# Patient Record
Sex: FEMALE | Race: WHITE | Employment: UNEMPLOYED | URBAN - NONMETROPOLITAN AREA
[De-identification: names, ages, dates, MRNs, and addresses within clinical notes are randomized per-mention and may not be internally consistent; named-entity substitution may affect disease eponyms.]

---

## 2017-11-12 ENCOUNTER — HOSPITAL ENCOUNTER (INPATIENT)
Age: 27
LOS: 4 days | Discharge: HOME | DRG: 897 | End: 2017-11-16
Attending: EMERGENCY MEDICINE | Admitting: PSYCHIATRY & NEUROLOGY
Payer: MEDICAID

## 2017-11-12 DIAGNOSIS — R45.851 SUICIDAL IDEATION: Primary | ICD-10-CM

## 2017-11-12 DIAGNOSIS — N30.01 ACUTE CYSTITIS WITH HEMATURIA: ICD-10-CM

## 2017-11-12 DIAGNOSIS — E87.6 HYPOKALEMIA: ICD-10-CM

## 2017-11-12 DIAGNOSIS — F15.10 AMPHETAMINE ABUSE (HCC): ICD-10-CM

## 2017-11-12 LAB
ACETAMINOPHEN LEVEL: <15 UG/ML
ALBUMIN SERPL-MCNC: 4.6 G/DL (ref 3.5–5.2)
ALP BLD-CCNC: 70 U/L (ref 35–104)
ALT SERPL-CCNC: 13 U/L (ref 5–33)
AMPHETAMINE SCREEN, URINE: POSITIVE
ANION GAP SERPL CALCULATED.3IONS-SCNC: 13 MMOL/L (ref 7–19)
AST SERPL-CCNC: 28 U/L (ref 5–32)
BACTERIA: ABNORMAL /HPF
BARBITURATE SCREEN URINE: NEGATIVE
BASOPHILS ABSOLUTE: 0 K/UL (ref 0–0.2)
BASOPHILS RELATIVE PERCENT: 0.3 % (ref 0–1)
BENZODIAZEPINE SCREEN, URINE: NEGATIVE
BILIRUB SERPL-MCNC: 0.3 MG/DL (ref 0.2–1.2)
BILIRUBIN URINE: NEGATIVE
BLOOD, URINE: ABNORMAL
BUN BLDV-MCNC: 15 MG/DL (ref 6–20)
CALCIUM SERPL-MCNC: 9.8 MG/DL (ref 8.6–10)
CANNABINOID SCREEN URINE: NEGATIVE
CHLORIDE BLD-SCNC: 99 MMOL/L (ref 98–111)
CLARITY: ABNORMAL
CO2: 29 MMOL/L (ref 22–29)
COCAINE METABOLITE SCREEN URINE: NEGATIVE
COLOR: YELLOW
CREAT SERPL-MCNC: 0.7 MG/DL (ref 0.5–0.9)
CRYSTALS, UA: ABNORMAL /HPF
EOSINOPHILS ABSOLUTE: 0.2 K/UL (ref 0–0.6)
EOSINOPHILS RELATIVE PERCENT: 3.1 % (ref 0–5)
EPITHELIAL CELLS, UA: ABNORMAL /HPF
ETHANOL: <10 MG/DL (ref 0–0.08)
GFR NON-AFRICAN AMERICAN: >60
GLUCOSE BLD-MCNC: 87 MG/DL (ref 74–109)
GLUCOSE URINE: NEGATIVE MG/DL
HCG(URINE) PREGNANCY TEST: NEGATIVE
HCT VFR BLD CALC: 43.7 % (ref 37–47)
HEMOGLOBIN: 13.8 G/DL (ref 12–16)
KETONES, URINE: NEGATIVE MG/DL
LEUKOCYTE ESTERASE, URINE: ABNORMAL
LYMPHOCYTES ABSOLUTE: 2 K/UL (ref 1.1–4.5)
LYMPHOCYTES RELATIVE PERCENT: 26.2 % (ref 20–40)
Lab: ABNORMAL
MCH RBC QN AUTO: 30.9 PG (ref 27–31)
MCHC RBC AUTO-ENTMCNC: 31.6 G/DL (ref 33–37)
MCV RBC AUTO: 98 FL (ref 81–99)
MONOCYTES ABSOLUTE: 0.6 K/UL (ref 0–0.9)
MONOCYTES RELATIVE PERCENT: 7.9 % (ref 0–10)
NEUTROPHILS ABSOLUTE: 4.8 K/UL (ref 1.5–7.5)
NEUTROPHILS RELATIVE PERCENT: 62.2 % (ref 50–65)
NITRITE, URINE: NEGATIVE
OPIATE SCREEN URINE: NEGATIVE
PDW BLD-RTO: 14.2 % (ref 11.5–14.5)
PH UA: 6
PLATELET # BLD: 365 K/UL (ref 130–400)
PMV BLD AUTO: 9.5 FL (ref 9.4–12.3)
POTASSIUM SERPL-SCNC: 3.2 MMOL/L (ref 3.5–5)
PROTEIN UA: ABNORMAL MG/DL
RBC # BLD: 4.46 M/UL (ref 4.2–5.4)
SALICYLATE, SERUM: <3 MG/DL (ref 3–10)
SODIUM BLD-SCNC: 141 MMOL/L (ref 136–145)
SPECIFIC GRAVITY UA: 1.02
TOTAL PROTEIN: 8.4 G/DL (ref 6.6–8.7)
UROBILINOGEN, URINE: 0.2 E.U./DL
WBC # BLD: 7.6 K/UL (ref 4.8–10.8)
WBC UA: ABNORMAL /HPF (ref 0–5)

## 2017-11-12 PROCEDURE — 6370000000 HC RX 637 (ALT 250 FOR IP): Performed by: EMERGENCY MEDICINE

## 2017-11-12 PROCEDURE — 80307 DRUG TEST PRSMV CHEM ANLYZR: CPT

## 2017-11-12 PROCEDURE — 80053 COMPREHEN METABOLIC PANEL: CPT

## 2017-11-12 PROCEDURE — 36415 COLL VENOUS BLD VENIPUNCTURE: CPT

## 2017-11-12 PROCEDURE — G0480 DRUG TEST DEF 1-7 CLASSES: HCPCS

## 2017-11-12 PROCEDURE — 1240000000 HC EMOTIONAL WELLNESS R&B

## 2017-11-12 PROCEDURE — 85025 COMPLETE CBC W/AUTO DIFF WBC: CPT

## 2017-11-12 PROCEDURE — 99285 EMERGENCY DEPT VISIT HI MDM: CPT

## 2017-11-12 PROCEDURE — 99284 EMERGENCY DEPT VISIT MOD MDM: CPT | Performed by: EMERGENCY MEDICINE

## 2017-11-12 PROCEDURE — 81025 URINE PREGNANCY TEST: CPT

## 2017-11-12 PROCEDURE — 87086 URINE CULTURE/COLONY COUNT: CPT

## 2017-11-12 PROCEDURE — 81001 URINALYSIS AUTO W/SCOPE: CPT

## 2017-11-12 RX ORDER — POTASSIUM CHLORIDE 3 G/15ML
40 SOLUTION ORAL ONCE
Status: COMPLETED | OUTPATIENT
Start: 2017-11-12 | End: 2017-11-12

## 2017-11-12 RX ORDER — ACETAMINOPHEN 325 MG/1
650 TABLET ORAL EVERY 4 HOURS PRN
Status: DISCONTINUED | OUTPATIENT
Start: 2017-11-12 | End: 2017-11-13

## 2017-11-12 RX ORDER — NICOTINE 21 MG/24HR
1 PATCH, TRANSDERMAL 24 HOURS TRANSDERMAL DAILY
Status: DISCONTINUED | OUTPATIENT
Start: 2017-11-12 | End: 2017-11-16 | Stop reason: HOSPADM

## 2017-11-12 RX ORDER — NITROFURANTOIN 25; 75 MG/1; MG/1
100 CAPSULE ORAL ONCE
Status: COMPLETED | OUTPATIENT
Start: 2017-11-12 | End: 2017-11-12

## 2017-11-12 RX ADMIN — Medication 40 MEQ: at 16:40

## 2017-11-12 RX ADMIN — NITROFURANTOIN (MONOHYDRATE/MACROCRYSTALS) 100 MG: 75; 25 CAPSULE ORAL at 16:40

## 2017-11-12 ASSESSMENT — SLEEP AND FATIGUE QUESTIONNAIRES
SLEEP PATTERN: INSOMNIA
AVERAGE NUMBER OF SLEEP HOURS: 2
DIFFICULTY ARISING: YES
DO YOU HAVE DIFFICULTY SLEEPING: YES
DO YOU USE A SLEEP AID: YES
DIFFICULTY STAYING ASLEEP: YES
RESTFUL SLEEP: NO
DIFFICULTY FALLING ASLEEP: YES

## 2017-11-12 ASSESSMENT — ENCOUNTER SYMPTOMS
DIARRHEA: 0
BACK PAIN: 0
SHORTNESS OF BREATH: 0
CONSTIPATION: 0
CHEST TIGHTNESS: 0
BLOOD IN STOOL: 0
ABDOMINAL PAIN: 0
COUGH: 0
VOMITING: 0
NAUSEA: 0

## 2017-11-12 ASSESSMENT — PAIN SCALES - GENERAL
PAINLEVEL_OUTOF10: 0
PAINLEVEL_OUTOF10: 0

## 2017-11-12 ASSESSMENT — PATIENT HEALTH QUESTIONNAIRE - PHQ9: SUM OF ALL RESPONSES TO PHQ QUESTIONS 1-9: 24

## 2017-11-12 ASSESSMENT — LIFESTYLE VARIABLES: HISTORY_ALCOHOL_USE: YES
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
## 2017-11-12 NOTE — ED NOTES
Pt gowned and monitored. Personal belongs removed and placed at RN station. Suicidal Flowsheet Initiated.      Missael Muse RN  11/12/17 6683

## 2017-11-12 NOTE — PROGRESS NOTES
BHI Admission From ED  Nursing Admission Note    Admission Type: Voluntary    Reason for Admission: suicidal ideation, no plan, multiple substance abuse    There is no problem list on file for this patient. Pt admitted from Dr. Estefany sin in ED to Adult [facility] room # 602-1. Arrived on unit via [facility] with . Pt appropriately attired in paper scrubs. Body assessment completed by Nba RN with no contraband discovered. All tubes, lines, and drains were appropriately discontinued by ED staff prior to pt transfer to [facility]. Pt belongings and valuables inventoried and cataloged, stored per policy. Pt oriented to surroundings, program expectations, and copy pt rights given. Received admit packet: [facility], Visitation Info, Fall Prevention, Restraints Info. Consents reviewed, signed Pt Rights, Handbook Acceptance, Visit/Call Acceptance, PHI Release, Social Info Release, and Treatment Agreement. Pt verbalizes understanding. Identifies stressors. Substance abuse.     Status and Exam  Normal: No  Facial Expression: Flat, Avoids Gaze  Affect: Blunt  Level of Consciousness: Alert  Mood:Normal: No  Mood: Depressed, Anxious, Empty, Guilty, Sad  Motor Activity:Normal: No  Motor Activity: Repetitive Acts  Interview Behavior: Cooperative  Preception: [place] to Person, Antonetta Shave to Time, [place] to Place, [place] to Situation  Attention:Normal: No  Attention: Distractible, Unable to Concentrate  Thought Processes: Circumstantial, Tangential  Thought Content:Normal: No  Thought Content: Preoccupations  Hallucinations: Visual (Comment) (visual when coming down from drugs)  Delusions: No  Memory:Normal: No  Memory: Poor Recent  Insight and Judgment: No  Insight and Judgment: Poor Judgment, Poor Insight  Present Suicidal Ideation: Yes  Present Homicidal Ideation: No    Germain Monday RN
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
## 2017-11-13 PROBLEM — F19.10 DRUG ABUSE (HCC): Status: ACTIVE | Noted: 2017-11-13

## 2017-11-13 PROBLEM — F15.10 METHAMPHETAMINE ABUSE (HCC): Status: ACTIVE | Noted: 2017-11-13

## 2017-11-13 PROBLEM — F10.10 ALCOHOL ABUSE: Status: ACTIVE | Noted: 2017-11-13

## 2017-11-13 PROBLEM — F19.94 SUBSTANCE INDUCED MOOD DISORDER (HCC): Status: ACTIVE | Noted: 2017-11-13

## 2017-11-13 PROBLEM — F11.10 OPIOID ABUSE (HCC): Status: ACTIVE | Noted: 2017-11-13

## 2017-11-13 PROCEDURE — 90686 IIV4 VACC NO PRSV 0.5 ML IM: CPT | Performed by: PSYCHIATRY & NEUROLOGY

## 2017-11-13 PROCEDURE — 6370000000 HC RX 637 (ALT 250 FOR IP): Performed by: NURSE PRACTITIONER

## 2017-11-13 PROCEDURE — 6370000000 HC RX 637 (ALT 250 FOR IP): Performed by: FAMILY MEDICINE

## 2017-11-13 PROCEDURE — 6360000002 HC RX W HCPCS: Performed by: FAMILY MEDICINE

## 2017-11-13 PROCEDURE — G0008 ADMIN INFLUENZA VIRUS VAC: HCPCS | Performed by: PSYCHIATRY & NEUROLOGY

## 2017-11-13 PROCEDURE — 90792 PSYCH DIAG EVAL W/MED SRVCS: CPT | Performed by: NURSE PRACTITIONER

## 2017-11-13 PROCEDURE — 6360000002 HC RX W HCPCS: Performed by: PSYCHIATRY & NEUROLOGY

## 2017-11-13 PROCEDURE — 6370000000 HC RX 637 (ALT 250 FOR IP): Performed by: PSYCHIATRY & NEUROLOGY

## 2017-11-13 PROCEDURE — 1240000000 HC EMOTIONAL WELLNESS R&B

## 2017-11-13 RX ORDER — ESCITALOPRAM OXALATE 10 MG/1
10 TABLET ORAL DAILY
Status: DISCONTINUED | OUTPATIENT
Start: 2017-11-13 | End: 2017-11-16 | Stop reason: HOSPADM

## 2017-11-13 RX ORDER — AZITHROMYCIN 250 MG/1
1000 TABLET, FILM COATED ORAL ONCE
Status: COMPLETED | OUTPATIENT
Start: 2017-11-13 | End: 2017-11-13

## 2017-11-13 RX ORDER — IBUPROFEN 400 MG/1
400 TABLET ORAL EVERY 6 HOURS PRN
Status: DISCONTINUED | OUTPATIENT
Start: 2017-11-13 | End: 2017-11-16 | Stop reason: HOSPADM

## 2017-11-13 RX ORDER — THIAMINE MONONITRATE (VIT B1) 100 MG
100 TABLET ORAL DAILY
Status: DISCONTINUED | OUTPATIENT
Start: 2017-11-13 | End: 2017-11-16 | Stop reason: HOSPADM

## 2017-11-13 RX ORDER — OLANZAPINE 10 MG/1
10 TABLET ORAL NIGHTLY
Status: DISCONTINUED | OUTPATIENT
Start: 2017-11-13 | End: 2017-11-16 | Stop reason: HOSPADM

## 2017-11-13 RX ORDER — CEFTRIAXONE SODIUM 250 MG/1
250 INJECTION, POWDER, FOR SOLUTION INTRAMUSCULAR; INTRAVENOUS ONCE
Status: COMPLETED | OUTPATIENT
Start: 2017-11-13 | End: 2017-11-13

## 2017-11-13 RX ORDER — CLONIDINE HYDROCHLORIDE 0.1 MG/1
0.1 TABLET ORAL EVERY 4 HOURS PRN
Status: DISCONTINUED | OUTPATIENT
Start: 2017-11-13 | End: 2017-11-16 | Stop reason: HOSPADM

## 2017-11-13 RX ORDER — FOLIC ACID 1 MG/1
1 TABLET ORAL DAILY
Status: DISCONTINUED | OUTPATIENT
Start: 2017-11-13 | End: 2017-11-13

## 2017-11-13 RX ORDER — OLANZAPINE 5 MG/1
5 TABLET ORAL ONCE
Status: COMPLETED | OUTPATIENT
Start: 2017-11-13 | End: 2017-11-13

## 2017-11-13 RX ORDER — LOPERAMIDE HYDROCHLORIDE 2 MG/1
2 CAPSULE ORAL 4 TIMES DAILY PRN
Status: DISCONTINUED | OUTPATIENT
Start: 2017-11-13 | End: 2017-11-16 | Stop reason: HOSPADM

## 2017-11-13 RX ORDER — CHLORDIAZEPOXIDE HYDROCHLORIDE 25 MG/1
25 CAPSULE, GELATIN COATED ORAL EVERY 4 HOURS PRN
Status: DISCONTINUED | OUTPATIENT
Start: 2017-11-13 | End: 2017-11-16 | Stop reason: HOSPADM

## 2017-11-13 RX ORDER — CEFTRIAXONE 1 G/1
250 INJECTION, POWDER, FOR SOLUTION INTRAMUSCULAR; INTRAVENOUS ONCE
Status: DISCONTINUED | OUTPATIENT
Start: 2017-11-13 | End: 2017-11-13 | Stop reason: SDUPTHER

## 2017-11-13 RX ORDER — DICYCLOMINE HYDROCHLORIDE 10 MG/1
10 CAPSULE ORAL
Status: DISCONTINUED | OUTPATIENT
Start: 2017-11-13 | End: 2017-11-16 | Stop reason: HOSPADM

## 2017-11-13 RX ADMIN — DICYCLOMINE HYDROCHLORIDE 10 MG: 10 CAPSULE ORAL at 15:45

## 2017-11-13 RX ADMIN — ESCITALOPRAM OXALATE 10 MG: 10 TABLET ORAL at 11:07

## 2017-11-13 RX ADMIN — DICYCLOMINE HYDROCHLORIDE 10 MG: 10 CAPSULE ORAL at 11:08

## 2017-11-13 RX ADMIN — AZITHROMYCIN 1000 MG: 250 TABLET, FILM COATED ORAL at 15:45

## 2017-11-13 RX ADMIN — IBUPROFEN 400 MG: 600 TABLET, FILM COATED ORAL at 21:18

## 2017-11-13 RX ADMIN — Medication 100 MG: at 11:08

## 2017-11-13 RX ADMIN — ACETAMINOPHEN 650 MG: 325 TABLET, FILM COATED ORAL at 01:08

## 2017-11-13 RX ADMIN — IBUPROFEN 400 MG: 600 TABLET, FILM COATED ORAL at 11:07

## 2017-11-13 RX ADMIN — ACETAMINOPHEN 650 MG: 325 TABLET, FILM COATED ORAL at 09:20

## 2017-11-13 RX ADMIN — OLANZAPINE 5 MG: 5 TABLET, FILM COATED ORAL at 11:07

## 2017-11-13 RX ADMIN — OLANZAPINE 10 MG: 10 TABLET, FILM COATED ORAL at 21:18

## 2017-11-13 RX ADMIN — CEFTRIAXONE SODIUM 250 MG: 250 INJECTION, POWDER, FOR SOLUTION INTRAMUSCULAR; INTRAVENOUS at 15:45

## 2017-11-13 RX ADMIN — CHLORDIAZEPOXIDE HYDROCHLORIDE 25 MG: 25 CAPSULE ORAL at 11:07

## 2017-11-13 RX ADMIN — INFLUENZA A VIRUS A/CALIFORNIA/7/2009 X-179A (H1N1) ANTIGEN (FORMALDEHYDE INACTIVATED), INFLUENZA A VIRUS A/TEXAS/50/2012 X-223A (H3N2) ANTIGEN (FORMALDEHYDE INACTIVATED), INFLUENZA B VIRUS B/MASSACHUSETTS/2/2012 BX-51B ANTIGEN (FORMALDEHYDE INACTIVATED), AND INFLUENZA B VIRUS B/BRISBANE/60/2008 ANTIGEN (FORMALDEHYDE INACTIVATED) 0.5 ML: 15; 15; 15; 15 INJECTION, SUSPENSION INTRAMUSCULAR at 09:18

## 2017-11-13 ASSESSMENT — PAIN SCALES - GENERAL
PAINLEVEL_OUTOF10: 6
PAINLEVEL_OUTOF10: 4
PAINLEVEL_OUTOF10: 6
PAINLEVEL_OUTOF10: 5

## 2017-11-13 NOTE — PROGRESS NOTES
Patient is alert but very guarded with flat effect. Looks like she hasnt showered this am.  She said sleep was poor due to nightmares. Appetite has been good. Denies homicidal thoughts and auditory and visual hallucinations. When asked if she was having any suicidal thoughts she looked down and wouldn't give me an answer. I asked her again and had to say her name and tell her to look at me and she nodded no while looking down at the ground.

## 2017-11-13 NOTE — PROGRESS NOTES
Requirement Note     SW met with pt to complete Psychosocial within 72 hours, CSSRS within 24 hours, and treatment plan signature sheet within 72 hours. In the last 6 months has the pt been a danger to self: YES  In the last 6 months has the pt been a danger to others: /NO    Provided pt with [employer] Online handout entitled \"Quitting Smoking. \"  Reviewed handout with pt addressing dangers of smoking, developing coping skills, and providing basic information about quitting. Patient received all components practical counseling of tobacco practical counseling during the hospital stay.

## 2017-11-13 NOTE — PROGRESS NOTES
Admission Note      Reason for admission/Target Symptom: increasing depression and SI  Diagnoses:  UDS: Negative- Benzo- Opiate- Amphetamines- THC- Cocaine- Barbs  BAL: Negative     SW met with treatment team to discuss patient treatment and follow up care plans. Pt was admitted to [facility]. Treatment team has  identified patients discharge needs as medication management and therapy with [address] [place] [address]. Pt validated need for appointments. Pt was also provided a handout of contact information for drug and alcohol treatment centers and other community support service such as [facility] .

## 2017-11-13 NOTE — PLAN OF CARE
Problem: Altered Mood, Depressive Behavior  Goal: LTG-Able to verbalize acceptance of life and situations over which he or she has no control  Outcome: Ongoing                                                                      Group Therapy Note    Date: 11/13/2017  Start Time: 1000  End Time:  1045  Number of Participants: 17    Type of Group: Psychotherapy      Patient's Goal: Patient will process emotions and actions and how to relate to other or their with others. Patient discussed open communication and feelings and emotions. Notes:  Patient attended process group as scheduled, patient identified a issue to work on today regarding how they will interact and relate to others. Status After Intervention:  Improved    Participation Level:  Active Listener    Participation Quality: Appropriate, Attentive and Sharing      Speech:  normal      Thought Process/Content: Logical      Affective Functioning: Congruent      Mood: euthymic      Level of consciousness:  Alert      Response to Learning: Able to verbalize current knowledge/experience      Endings: None Reported    Modes of Intervention: Education, Support and Socialization      Discipline Responsible: /Counselor      Signature:  Farzana Enamorado [facility]

## 2017-11-13 NOTE — PLAN OF CARE
Problem: Injury - Risk of, Substance Overdose:  Goal: No signs of physiological stress  Absence of drug withdrawal signs and symptoms   Outcome: Ongoing    Goal: Ability to remain free from injury will improve  Ability to remain free from injury will improve   Outcome: Ongoing      Problem: Mood - Altered:  Goal: Mood stable  Mood stable   Outcome: Ongoing      Problem: Violence - Risk of, Self/Other-Directed:  Goal: Knowledge of developmental care interventions  Absence of violence   Outcome: Ongoing

## 2017-11-13 NOTE — PROGRESS NOTES
Treatment Team Note:    JANET met with [address] [place] [address] team to discuss Pts [address] plans. Progress/Behavior/Group Attendance: TBD    Target Symptoms/Reason for admission:     Diagnoses:     UDS: Neg- Benzo-Opiate- Amphetamines- THC-Cocaine- Barbs     BAL: Neg    AftercarePlan: [address] lives with: JANET will meet with pt to gather information. Collateral obtained from: JANET will meet with pt to gather release of information.   On:    Family Session: HOANG    Misc:
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
## 2017-11-14 LAB
ANION GAP SERPL CALCULATED.3IONS-SCNC: 11 MMOL/L (ref 7–19)
BUN BLDV-MCNC: 22 MG/DL (ref 6–20)
CALCIUM SERPL-MCNC: 8.5 MG/DL (ref 8.6–10)
CHLORIDE BLD-SCNC: 105 MMOL/L (ref 98–111)
CO2: 23 MMOL/L (ref 22–29)
CREAT SERPL-MCNC: 0.5 MG/DL (ref 0.5–0.9)
GFR NON-AFRICAN AMERICAN: >60
GLUCOSE BLD-MCNC: 86 MG/DL (ref 74–109)
POTASSIUM SERPL-SCNC: 3.8 MMOL/L (ref 3.5–5)
SODIUM BLD-SCNC: 139 MMOL/L (ref 136–145)
T4 FREE: 1.1 NG/DL (ref 0.9–1.7)
TSH SERPL DL<=0.05 MIU/L-ACNC: 0.2 UIU/ML (ref 0.27–4.2)
URINE CULTURE, ROUTINE: NORMAL
VITAMIN B-12: 374 PG/ML (ref 211–946)
VITAMIN D 25-HYDROXY: 28.5 NG/ML

## 2017-11-14 PROCEDURE — 36415 COLL VENOUS BLD VENIPUNCTURE: CPT

## 2017-11-14 PROCEDURE — 87591 N.GONORRHOEAE DNA AMP PROB: CPT

## 2017-11-14 PROCEDURE — 82607 VITAMIN B-12: CPT

## 2017-11-14 PROCEDURE — 99231 SBSQ HOSP IP/OBS SF/LOW 25: CPT | Performed by: NURSE PRACTITIONER

## 2017-11-14 PROCEDURE — 84443 ASSAY THYROID STIM HORMONE: CPT

## 2017-11-14 PROCEDURE — 1240000000 HC EMOTIONAL WELLNESS R&B

## 2017-11-14 PROCEDURE — 6370000000 HC RX 637 (ALT 250 FOR IP): Performed by: PSYCHIATRY & NEUROLOGY

## 2017-11-14 PROCEDURE — 82306 VITAMIN D 25 HYDROXY: CPT

## 2017-11-14 PROCEDURE — 6370000000 HC RX 637 (ALT 250 FOR IP): Performed by: NURSE PRACTITIONER

## 2017-11-14 PROCEDURE — 84439 ASSAY OF FREE THYROXINE: CPT

## 2017-11-14 PROCEDURE — 80048 BASIC METABOLIC PNL TOTAL CA: CPT

## 2017-11-14 PROCEDURE — 87491 CHLMYD TRACH DNA AMP PROBE: CPT

## 2017-11-14 RX ORDER — ERGOCALCIFEROL (VITAMIN D2) 1250 MCG
50000 CAPSULE ORAL WEEKLY
Qty: 11 CAPSULE | Refills: 0 | Status: ON HOLD | OUTPATIENT
Start: 2017-11-14 | End: 2017-11-22 | Stop reason: HOSPADM

## 2017-11-14 RX ORDER — ERGOCALCIFEROL 1.25 MG/1
50000 CAPSULE ORAL WEEKLY
Status: DISCONTINUED | OUTPATIENT
Start: 2017-11-14 | End: 2017-11-14

## 2017-11-14 RX ORDER — ERGOCALCIFEROL 1.25 MG/1
50000 CAPSULE ORAL WEEKLY
Status: DISCONTINUED | OUTPATIENT
Start: 2017-11-15 | End: 2017-11-16 | Stop reason: HOSPADM

## 2017-11-14 RX ADMIN — DICYCLOMINE HYDROCHLORIDE 10 MG: 10 CAPSULE ORAL at 06:35

## 2017-11-14 RX ADMIN — IBUPROFEN 400 MG: 600 TABLET, FILM COATED ORAL at 15:09

## 2017-11-14 RX ADMIN — OLANZAPINE 10 MG: 10 TABLET, FILM COATED ORAL at 21:55

## 2017-11-14 RX ADMIN — Medication 100 MG: at 10:11

## 2017-11-14 RX ADMIN — DICYCLOMINE HYDROCHLORIDE 10 MG: 10 CAPSULE ORAL at 10:14

## 2017-11-14 RX ADMIN — ESCITALOPRAM OXALATE 10 MG: 10 TABLET ORAL at 10:11

## 2017-11-14 RX ADMIN — DICYCLOMINE HYDROCHLORIDE 10 MG: 10 CAPSULE ORAL at 15:09

## 2017-11-14 RX ADMIN — CHLORDIAZEPOXIDE HYDROCHLORIDE 25 MG: 25 CAPSULE ORAL at 15:09

## 2017-11-14 ASSESSMENT — PAIN SCALES - GENERAL: PAINLEVEL_OUTOF10: 5

## 2017-11-14 NOTE — PROGRESS NOTES
Group Therapy Note:    Date: 11/13/17  Time: 21:00 to 21:30    Type of Group:  Wrap-Up Group    Patient Goals:  Patient did not participate in group session. Reported to nurse patient did not participate in group session. Encouragement given per staff.       Notes: N/A      Cielo JAVED

## 2017-11-14 NOTE — PROGRESS NOTES
Patient is lethargic and disheveled. Not doing ADLS or attending group. Patient just wants to stay in room all day and sleep. Agitated and says doesn't feel well. Rates anxiety a 5 and depression a 6 on a scale of 1-10 with 10 being the highest.  Denies SI, HI, and AVH. Sleep is good, appetite is poor.

## 2017-11-14 NOTE — H&P
HISTORY and PHYSICAL      CHIEF COMPLAINT:  Depression, SI    Reason for Admission:  Depression, SI    History Obtained From:  patient    HISTORY OF PRESENT ILLNESS:      The patient is a 24 y.o. female who is admitted to the [place] [address] unit with worsening mood issues. She has no c/o CP or SOA. No abdominal pain or N/V. No dysuria. No recent illnesses or fevers. No current pain complaints. Past Medical History:        Diagnosis Date    Anxiety     Bipolar 1 disorder ([address]     Depression      Past Surgical History:        Procedure Laterality Date    TONSILLECTOMY AND ADENOIDECTOMY           Medications Prior to Admission:    No prescriptions prior to admission. Allergies:  Amoxicillin    Social History:   TOBACCO:   reports that she has been smoking. She has never used smokeless tobacco.  ETOH:   reports that she drinks alcohol. DRUGS:   reports that she uses drugs, including Methamphetamines. MARITAL STATUS:  Not   OCCUPATION:  Not working  Patient currently lives on the street      Family History:   History reviewed. No pertinent family history. REVIEW OF SYSTEMS:  Constitutional: neg  CV: neg  Pulmonary: neg  GI: neg  : neg  Psych:   Neuro: neg  Skin: neg  MusculoSkeletal: neg  HEENT: neg  Joints: neg    Vitals:  BP (!) 102/56   Pulse 60   Temp 98.3 °F (36.8 °C) (Temporal)   Resp 18   Ht 5' 1\" (1.549 m)   Wt 99 lb 2 oz (45 kg)   LMP 10/20/2017   SpO2 100%   BMI 18.73 kg/m²     PHYSICAL EXAM:  Gen: NAD, alert  HEENT: WNL  Lymph: no LAD  Neck: no JVD or masses  Chest: CTA bilat  CV: RRR  Abdomen: NT/ND  Extrem: no C/C/E  Neuro: non focal  Skin: no rashes  Joints: no redness    DATA:  I have reviewed the admission labs and imaging tests.     ASSESSMENT AND PLAN:      Patient Active Hospital Problem List:   Substance induced mood disorder, SI---follow with Psych   Methamphetamine abuse    Pyuria----follow with culture

## 2017-11-14 NOTE — PROGRESS NOTES
[address] [place] [address]      Psychiatric Progress Note    Name:  Sin Skinner  Date:  11/14/2017  Age:  24 y.o. Sex:  female  Ethnicity:   Primary Care Physician:  No primary care provider on file. Patient Care Team:  No care team member to display  Chief Complaint: \" If I don't get help I will kill myself. \"        Historian:patient  Complaint Type: anxiety, decreased appetite, depression, fatigue, illegal drug usage, loss of interest in favorite activities, mood swings, sleep disturbance and tobacco use  Course of Symptoms: ongoing  Precipitating Factors: history of polysubstance abuse       Subjective  Patient reports that she slept fair last night. Patient denies SI, HI or psychosis. Patient has been calm and cooperative with staff and peers. Patient has been in the milieu sleeping most of the day. Patient has been compliant with medications. Patient reports that she feels irritable related to coming off of the drugs. Patient states, \"I just need to sleep for a long time. \" Patient has not been completing her ADL's. The patient continues to need, on a daily basis, active treatment furnished directly by or requiring the supervision of inpatient psychiatric facility personnel. Objective  No SI or HI  No psychosis  Vital signs stable      Previous Psychiatric/Substance Use History      Medical History:  Past Medical History:   Diagnosis Date    Anxiety     Bipolar 1 disorder ([facility]     Depression         MCLEAN History:   History   Alcohol Use    Yes     Comment: vodka daily, lasdt drink was friday         History   Drug Use    Types: Methamphetamines     Comment: heroin, spice        History   Smoking Status    Current Some Day Smoker   Smokeless Tobacco    Never Used        Family History:     History reviewed. No pertinent family history.       Vital Signs:  Last set of tests and vitals:  Vitals:    11/14/17 0857   BP: 107/61   Pulse: 66   Resp: 16   Temp: 98.5 °F (36.9 °C)   SpO2: 100%          Mental Status:  Level of consciousness:  within normal limits and awake  Appearance:  ill-appearing, street clothes, in chair, fair grooming and fair hygiene  Behavior/Motor:  no abnormalities noted  Attitude toward examiner:  cooperative, attentive and good eye contact  Speech:  normal rate and normal volume  Mood:  depressed  Affect:  flat  Thought processes:  slow  Thought content:  Homocidal ideation : denies  Suicidal Ideation:  denies suicidal ideation  Delusions:  no evidence of delusions  Perceptual Disturbance:  denies any perceptual disturbance  Cognition:  oriented to person, place, and time  Concentration : poor  Memory intact for recent and [employer] of knowledge:  average  Abstract thinking:  adequate  Insight:  poor  Judgment:  poor        Current Medications:  Current Facility-Administered Medications   Medication Dose Route Frequency Provider Last Rate Last Dose    OLANZapine (ZYPREXA) tablet 10 mg  10 mg Oral Nightly Yara Spell, APRN   10 mg at 11/13/17 2118    ibuprofen (ADVIL;MOTRIN) tablet 400 mg  400 mg Oral Q6H PRN Yara Spell, APRN   400 mg at 11/13/17 2118    chlordiazePOXIDE (LIBRIUM) capsule 25 mg  25 mg Oral Q4H PRN Yara Spell, APRN   25 mg at 11/13/17 1107    vitamin B-1 (THIAMINE) tablet 100 mg  100 mg Oral Daily Yara Spell, APRN   100 mg at 11/14/17 1011    dicyclomine (BENTYL) capsule 10 mg  10 mg Oral TID AC Keira Franklin, APRN   10 mg at 11/14/17 1014    loperamide (IMODIUM) capsule 2 mg  2 mg Oral 4x Daily PRN Yaranora Sánchez, APRN        cloNIDine (CATAPRES) tablet 0.1 mg  0.1 mg Oral Q4H PRN Yara Spell, APRN        escitalopram (LEXAPRO) tablet 10 mg  10 mg Oral Daily Yara Spell, APRN   10 mg at 11/14/17 1011    magnesium hydroxide (MILK OF MAGNESIA) 400 MG/5ML suspension 30 mL  30 mL Oral Daily PRN Magdiel Tolbert MD        nicotine (NICODERM CQ) 14 MG/24HR 1 patch  1 patch Transdermal Daily Sixto Viera

## 2017-11-15 LAB
CHOLESTEROL, TOTAL: 95 MG/DL (ref 160–199)
HBA1C MFR BLD: 4.8 %
HDLC SERPL-MCNC: 30 MG/DL (ref 65–121)
LDL CHOLESTEROL CALCULATED: 48 MG/DL
TRIGL SERPL-MCNC: 86 MG/DL (ref 0–149)

## 2017-11-15 PROCEDURE — 6370000000 HC RX 637 (ALT 250 FOR IP): Performed by: FAMILY MEDICINE

## 2017-11-15 PROCEDURE — 80061 LIPID PANEL: CPT

## 2017-11-15 PROCEDURE — 99231 SBSQ HOSP IP/OBS SF/LOW 25: CPT | Performed by: NURSE PRACTITIONER

## 2017-11-15 PROCEDURE — 36415 COLL VENOUS BLD VENIPUNCTURE: CPT

## 2017-11-15 PROCEDURE — 6370000000 HC RX 637 (ALT 250 FOR IP): Performed by: PSYCHIATRY & NEUROLOGY

## 2017-11-15 PROCEDURE — 1240000000 HC EMOTIONAL WELLNESS R&B

## 2017-11-15 PROCEDURE — 83036 HEMOGLOBIN GLYCOSYLATED A1C: CPT

## 2017-11-15 PROCEDURE — 6370000000 HC RX 637 (ALT 250 FOR IP): Performed by: NURSE PRACTITIONER

## 2017-11-15 RX ADMIN — OLANZAPINE 10 MG: 10 TABLET, FILM COATED ORAL at 21:18

## 2017-11-15 RX ADMIN — Medication 100 MG: at 09:21

## 2017-11-15 RX ADMIN — IBUPROFEN 400 MG: 600 TABLET, FILM COATED ORAL at 21:34

## 2017-11-15 RX ADMIN — DICYCLOMINE HYDROCHLORIDE 10 MG: 10 CAPSULE ORAL at 06:29

## 2017-11-15 RX ADMIN — DICYCLOMINE HYDROCHLORIDE 10 MG: 10 CAPSULE ORAL at 17:05

## 2017-11-15 RX ADMIN — DICYCLOMINE HYDROCHLORIDE 10 MG: 10 CAPSULE ORAL at 11:39

## 2017-11-15 RX ADMIN — ESCITALOPRAM OXALATE 10 MG: 10 TABLET ORAL at 09:21

## 2017-11-15 RX ADMIN — ERGOCALCIFEROL 50000 UNITS: 1.25 CAPSULE ORAL at 09:21

## 2017-11-15 ASSESSMENT — PAIN SCALES - GENERAL: PAINLEVEL_OUTOF10: 3

## 2017-11-15 NOTE — PLAN OF CARE
Problem: Altered Mood, Depressive Behavior  Goal: STG-Knowledge of positive coping patterns  Outcome: Ongoing                                                                      Group Therapy Note    Date: 11/15/2017  Start Time: 1400  End Time:  9165  Number of Participants: 13    Type of Group: Cognitive Skills    Wellness Binder Information  Module Name:  Stress  Session Number:  5    Patient's Goal:  To raise awareness of the effectiveness of leisure activities as diversionary coping skills    Notes:  Pt participated in group de-stressing activities to demonstrate the effectiveness of diversionary coping skills.     Status After Intervention:  Unchanged    Participation Level: Interactive    Participation Quality: Attentive      Speech:  normal      Thought Process/Content: Logical      Affective Functioning: Congruent      Mood: anxious and depressed      Level of consciousness:  Alert and Oriented x4      Response to Learning: Able to verbalize current knowledge/experience, Able to verbalize/acknowledge new learning and Progressing to goal      Endings: None Reported    Modes of Intervention: Education      Discipline Responsible: Psychoeducational Specialist      Signature:  Sharri Gross

## 2017-11-15 NOTE — PLAN OF CARE
Problem: Altered Mood, Depressive Behavior  Goal: LTG-Able to verbalize acceptance of life and situations over which he or she has no control  Outcome: Ongoing                                                                      Group Therapy Note    Date: 11/15/2017  Start Time: 1000  End Time:  1045  Number of Participants: 18    Type of Group: Psychotherapy    Patient's Goal: Patient will process emotions and actions and how to relate to other or their with others. Patient discussed open communication and feelings and emotions. Notes:  Patient attended process group as scheduled, patient identified a issue to work on today regarding how they will interact and relate to others. Status After Intervention:  Improved    Participation Level:  Active Listener    Participation Quality: Appropriate, Attentive and Sharing      Speech:  normal      Thought Process/Content: Logical      Affective Functioning: Congruent      Mood: euthymic      Level of consciousness:  Alert      Response to Learning: Able to verbalize current knowledge/experience      Endings: None Reported    Modes of Intervention: Education, Support and Socialization      Discipline Responsible: /Counselor      Signature:  Kunal Keller [facility]

## 2017-11-15 NOTE — PROGRESS NOTES
Treatment Team Note:     JANET met with [address] [place] [address] team to discuss Pts TX and DC plans.      Progress/Behavior/Group Attendance: TBD     Target Symptoms/Reason for admission:      Diagnoses:      UDS: Neg- Benzo-Opiate- Amphetamines- THC-Cocaine- Barbs         BAL: Neg     AftercarePlan: [facility]     Pt lives with: JANET will meet with pt to gather information.     Collateral obtained from: JANET will meet with pt to gather release of information.   On:     Family Session: HOANG     Misc:

## 2017-11-15 NOTE — PROGRESS NOTES
Group Therapy Note    Start Time: 0900  End Time:  0930  Number of Participants: 21    Type of Group: Community Meeting       Patient's Goal:  \"Be More Sociable\"      Notes:      Participation Level:  Active Listener       Participation Quality: Appropriate      Thought Process/Content: Logical      Affective Functioning: Congruent      Mood: elevated      Level of consciousness:  Alert      Modes of Intervention: Support      Discipline Responsible: Behavioral Health Tech II      Signature:  Emanuel Hunter

## 2017-11-15 NOTE — PROGRESS NOTES
[facility]      Psychiatric Progress Note    Name:  Huy Mcdaniels  Date:  11/15/2017  Age:  24 y.o. Sex:  female  Ethnicity:   Primary Care Physician:  No primary care provider on file. Patient Care Team:  No care team member to display  Chief Complaint: suicidal        Historian:patient  Complaint Type: anxiety, decreased appetite, depression, illegal drug usage, loss of interest in favorite activities, mood swings, sleep disturbance and tobacco use  Course of Symptoms: ongoing  Precipitating Factors: polysubstance abuse      Subjective  Patient reports that she slept fair. Patient denies SI, HI or psychosis. Patient has been isolative to her room. Patient has been calm and cooperative with staff and peers. Patient has been compliant with medications. Patient has not been attending groups. Patient reports that she is wanting to get into rehab for substance abuse. Patient reports that we need to help her with either rehab or a homeless shelter however she does not have a form of ID. Objective  No SI or HI  No PSYCHOSIS  Vital signs stable      Previous Psychiatric/Substance Use History      Medical History:  Past Medical History:   Diagnosis Date    Anxiety     Bipolar 1 disorder ([facility] [address]     Depression         MCLEAN History:   History   Alcohol Use    Yes     Comment: vodka daily, lasdt drink was friday         History   Drug Use    Types: Methamphetamines     Comment: heroin, spice        History   Smoking Status    Current Some Day Smoker   Smokeless Tobacco    Never Used        Family History:     History reviewed. No pertinent family history.       Vital Signs:  Last set of tests and vitals:  Vitals:    11/15/17 0723   BP: (!) 106/58   Pulse: 80   Resp: 16   Temp: 96.2 °F (35.7 °C)   SpO2: 100%          Mental Status:  Level of consciousness:  within normal limits and awake  Appearance:  well-appearing, street clothes, in chair, fair grooming and fair Daily Shannan Shepherd MD   1 patch at 11/15/17 0603       Psychotherapy:   supportive    DSM V Diagnoses:      ACTIVE MEDICAL PROBLEMS:  Patient Active Problem List   Diagnosis    Suicidal ideation    Substance induced mood disorder ([facility] [address]    Methamphetamine abuse    Opioid abuse    Drug abuse    Alcohol abuse             Plan:    Encourage group therapy  15 minutes safety checks  Medical monitoring by Dr. Lorie Riggs and associates  Continue current medications  Possible dc tomorrow    Amount of time spent with patient:  15 minutes with greater thanb 50% of the time spent in counseling and collaboration of care.     VENITA Chadwick  Clinician Signature: signed electronically

## 2017-11-15 NOTE — PROGRESS NOTES
ROLF PAYAN completed psychosocial and CSSR-S on this date. Pt long and short term goals discussed. Pt voiced understanding. Treatment sheet signed.     In the last 6 months has the pt been danger to self: Yes  In the last 6 months has the pt been danger to others: No     Patient refused/declined practical counseling of tobacco practical counseling during the hospital stay.      Electronically signed by Nithin BANSAL I on 11/15/2017 at 10:52 AM

## 2017-11-16 VITALS
DIASTOLIC BLOOD PRESSURE: 54 MMHG | BODY MASS INDEX: 20.32 KG/M2 | RESPIRATION RATE: 16 BRPM | WEIGHT: 107.6 LBS | OXYGEN SATURATION: 100 % | SYSTOLIC BLOOD PRESSURE: 101 MMHG | HEIGHT: 61 IN | TEMPERATURE: 97.2 F | HEART RATE: 57 BPM

## 2017-11-16 PROCEDURE — 99238 HOSP IP/OBS DSCHRG MGMT 30/<: CPT | Performed by: NURSE PRACTITIONER

## 2017-11-16 PROCEDURE — 6370000000 HC RX 637 (ALT 250 FOR IP): Performed by: NURSE PRACTITIONER

## 2017-11-16 RX ORDER — ESCITALOPRAM OXALATE 10 MG/1
10 TABLET ORAL DAILY
Qty: 30 TABLET | Refills: 0 | Status: ON HOLD | OUTPATIENT
Start: 2017-11-16 | End: 2017-11-22 | Stop reason: HOSPADM

## 2017-11-16 RX ORDER — OLANZAPINE 10 MG/1
10 TABLET ORAL NIGHTLY
Qty: 30 TABLET | Refills: 0 | Status: ON HOLD | OUTPATIENT
Start: 2017-11-16 | End: 2017-11-22 | Stop reason: HOSPADM

## 2017-11-16 RX ADMIN — DICYCLOMINE HYDROCHLORIDE 10 MG: 10 CAPSULE ORAL at 06:29

## 2017-11-16 NOTE — DISCHARGE SUMMARY
Discharge Summary     Patient ID:  Jose Calderon  [id number]  16 y.o.  1990    Admit date: 11/12/2017  Discharge date: 11/16/2017    Admitting Physician: Swetha Chavez MD   Attending Physician: Swetha Chavez MD  Discharge Physician: Wilberto Pastrana     Admission Diagnoses: Depression with suicidal ideation [F32.9, R45.851]    Discharge Diagnoses: Substance induced mood disorder    Admission Condition: fair    Discharged Condition: good    Indication for Admission: Substance induced mood disorder    HPI  Patient is a 25 y/o c/f who presents with depression and suicidal ideation. Patient UDS positive for amphetamines. Patient admits that she has been using Meth, Heroin, Spice, Percocet and started abusing Tylenol PM and drinking about a pint of Vodka every 3 days. Patient reports that her drug abuse started at age 26 and she began drinking 2 months ago. Patient states, \" I have hallucinations when I start coming off of the drugs. \" Patient is on disability. Patient reports that she has been homeless for 4 months. Patient reports that her mother was her Payee and caused her to be behind two months in rent. Patient reports that she feels hopeless and has no support. Patient reports that she lost custody of her daughter and is trying to get her back. Patient reports previous suicide attempt 2 years ago and a previous psychiatric hospitalization at [facility]. Patient reports that she has not been taking her prescribed psychiatric medications. Patient reports that she has previously been to rehab in [facility] and only stayed 2 days because \"they wouldn't let me sleep. \" Patient states, \"If I don't get help I will kill myself. \" Patient reports poor sleep and poor appetite. Patient states, \"Everyone just wants my money. \" \"My aunt kicked me out because I wasn't giving her all of my money. \" Patient reports that her boyfriend just left this unit last week.      Hospital Course:   Patient was admitted to Knowledge:  Adequate  Attention and Concentration:  Adequate        Patient Instructions:   Current Discharge Medication List      START taking these medications    Details   escitalopram (LEXAPRO) 10 MG tablet Take 1 tablet by mouth daily  Qty: 30 tablet, Refills: 0      OLANZapine (ZYPREXA) 10 MG tablet Take 1 tablet by mouth nightly  Qty: 30 tablet, Refills: 0      vitamin D (ERGOCALCIFEROL) 68055 units capsule Take 1 capsule by mouth once a week for 11 doses  Qty: 11 capsule, Refills: 0           Activity: activity as tolerated  Diet: regular diet  Wound Care: none needed    Follow-up with   PCP in 2 weeks.   [facility] in two weeks    Time worked: Less than 30 minutes    Participation:good    Electronically signed by VENITA Vyas on 11/16/2017 at 9:41 AM

## 2017-11-16 NOTE — BH NOTE
After Visit Summary      Kimberly Lind IP      Substance induced mood disorder (HCC)    2017 - 2017   [address]   [phone]    After Visit Summary   Instructions        Need Help? After Visit Summary  (Discharge Instructions)     This summary was created for you.     Thank you for entrusting your care to us. The following information includes details about your hospital/visit stay along with steps you should take to help with your recovery once you leave the hospital.  In this packet, you will find information about the topics listed below:      Instructions about your medications including a list of your home medications   A summary of your hospital visit   Follow-up appointments once you have left the hospital   Your care plan at home        You may receive a survey regarding the care you received during your stay. Your input is valuable to us. We encourage you to complete and return your survey in the envelope provided. We hope you will choose us in the future for your healthcare needs.        Your medications have changed     START taking:    ergocalciferol 82729 units capsule (ERGOCALCIFEROL)    escitalopram 10 MG tablet (LEXAPRO)    OLANZapine 10 MG tablet (ZYPREXA)   Review your updated medication list below. Care Plan Once You Return Home     Do         these medications from any pharmacy with your printed prescription   1 ergocalciferol   2 escitalopram   3 OLANZapine      Activity instructions     Resume activity as tolerated      Diet instructions     Good nutrition is important when healing from an illness, injury, or surgery.  Follow any nutrition recommendations given to you during your hospital stay. If you were given an oral nutrition supplement while in the hospital, continue to take this supplement at home.  You can take it with meals, in-between meals, and/or before bedtime.  These supplements can be purchased at most local [employer] stores, pharmacies, and chain super-stores. If you have any questions about your diet or nutrition, call the hospital and ask for the dietitian.            Your Visit     Here you will find information about your visit, including the reason for your visit. Please take this sheet with you when you visit your doctor or other health care provider in the future. It will help determine the best possible medical care for you at that time. If you have any questions once you leave the hospital, please call the department phone number listed below.   Why you were here     Your primary diagnosis was: Substance Induced Mood Disorder (Hcc)   Your diagnoses also included: Suicidal Ideation, Methamphetamine Abuse, Opioid Abuse, Drug Abuse, Alcohol Abuse   Preventive Care      Date Due   Pneumococcal Vaccine - Pneumovax for adults aged 19-64 years with: chronic heart disease, chronic lung disease, diabetes mellitus, alcoholism, chronic liver disease, or cigarette smoking. (1 of 1 - PPSV23) 09/11/2011          Follow Up Information and Future Appointments     Follow Up Information and Future Appointments          Follow up with PCP in 4 week(s)            Follow up with Primary Care Physician or sooner if needed in 4 week(s)     You are allergic to the following     You are allergic to the following   Allergen Reactions   Amoxicillin Not Noted   Immunizations Administered Administered for This Admission     Name Date   Influenza, Negro Bhatti, 3 yrs and older, IM, Preservative Free 11/13/2017   Your Latest Vitals          Blood Pressure 101/54              BMI 20.33              Weight 107 lb 9.6 oz              Height 5' 1\"              Temperature (Temporal) 97.2 °F              Pulse 57              Respiration 16              Oxygen Saturation 100%              BSA 1.45 m²               Daily Medication List (This medication list can be shared with any healthcare provider who is helping you manage your medications)     as of information. A complete copy of the After Visit Summary has been given to me, the patient and/or responsible adult.     Patient Signature/Responsible Adult:____________________     Clinician Signature:_____________________     Date:_____________________     Time:_____________________        MyChart Sign-Up     Send messages to your doctor, view your test results, renew your prescriptions, schedule appointments, and more. Go to [email or web address], click \"Sign Up Now\", and enter your personal activation code: [email or web address]. Activation code expires 1/15/2018. Instructions          Depression and Chronic Disease: Care Instructions  Your Care Instructions  A chronic disease is one that you have for a long time. Some chronic diseases can be controlled, but they usually cannot be cured. Depression is common in people with chronic diseases, but it often goes unnoticed. Many people have concerns about seeking treatment for a mental health problem. You may think it's a sign of weakness, or you don't want people to know about it. It's important to overcome these reasons for not seeking treatment. Treating depression or anxiety is good for your health. Follow-up care is a key part of your treatment and safety. Be sure to make and go to all appointments, and call your doctor if you are having problems. It's also a good idea to know your test results and keep a list of the medicines you take. How can you care for yourself at home? Watch for symptoms of depression  The symptoms of depression are often subtle at first. You may think they are caused by your disease rather than depression. Or you may think it is normal to be depressed when you have a chronic disease. If you are depressed you may:  ? Feel sad or hopeless. ? Feel guilty or worthless. ? Not enjoy the things you used to enjoy. ? Feel hopeless, as though life is not worth living.  ?  Have trouble thinking or remembering. ? Have low energy, and you may not eat or sleep well.  ? Pull away from others. ? Think often about death or killing yourself. (Keep the numbers for these national suicide hotlines: 6-708-681-TALK [1-436.652.5563] and 7-313-IGNMARO [1-384.102.9663]. )  Get treatment  By treating your depression, you can feel more hopeful and have more energy. If you feel better, you may take better care of yourself, so your health may improve. ? Talk to your doctor if you have any changes in mood during treatment for your disease. ? Ask your doctor for help. Counseling, antidepressant medicine, or a combination of the two can help most people with depression. Often a combination works best. Counseling can also help you cope with having a chronic disease. When should you call for help? Call 911 anytime you think you may need emergency care. For example, call if:  ? You feel like hurting yourself or someone else. ? Someone you know has depression and is about to attempt or is attempting suicide. Call your doctor now or seek immediate medical care if:  ? You hear voices. ? Someone you know has depression and:  § Starts to give away his or her possessions. § Uses illegal drugs or drinks alcohol heavily. § Talks or writes about death, including writing suicide notes or talking about guns, knives, or pills. § Starts to spend a lot of time alone. § Acts very aggressively or suddenly appears calm. Watch closely for changes in your health, and be sure to contact your doctor if:  ? You do not get better as expected. Where can you learn more? Go to [email or web address]://[email or web address] org and sign in to your [employer] account. Enter L362 in the [address] box to learn more about \"Depression and Chronic Disease: Care Instructions. \"     If you do not have an account, please click on the \"Sign Up Now\" link. Current as of: July 26, 2016  Content Version: 11.3  © 9407-6272 [employer].  Care instructions adapted under license by [facility] ([facility]). If you have questions about a medical condition or this instruction, always ask your healthcare professional. [address] any warranty or liability for your use of this information.       Suicidal Thoughts and Behavior: Care Instructions  Your Care Instructions  You have been seen by a doctor because you've had thoughts about killing yourself. Maybe you have tried to do it. This is much different from having fleeting thoughts of death, which many people have from time to time. Your doctor and support team will work hard to help keep you safe. Your team may include a , a , and a counselor. Most people who think about suicide don't want to die. They think suicide will end their problems and pain. People who consider suicide often feel hopeless, helpless, and worthless. These thoughts can make a person feel that there is no other choice. But you do have a choice. Help is always available. The doctor and staff members are taking you and your pain very seriously. It is important to remember that there are people who are willing and able to talk with you about your suicidal thoughts. Treatment and close follow-up care can help you feel better about life. Thoughts of hopelessness and suicide may come from being depressed. Depression is a medical condition. When you have depression, there may be problems with activity levels in certain parts of your brain. Chemicals in your brain called neurotransmitters may be out of balance. But depression can be treated. Treatment for depression includes counseling, medicines, and lifestyle changes. With treatment, you can feel better. Your doctor doesn't want you to hurt yourself. He or she may ask you to sign a \"no harm\" agreement or contract. This contract is your promise that you will not hurt yourself between now and your next visit.  Be completely honest with your doctor if you Instructions. \"     If you do not have an account, please click on the \"Sign Up Now\" link. Current as of: July 26, 2016  Content Version: 11.3  © 4413-2336 [employer]. Care instructions adapted under license by [facility] ([facility]). If you have questions about a medical condition or this instruction, always ask your healthcare professional. [address] any warranty or liability for your use of this information.   Vit D level in 12 weeks---forward to PCP

## 2017-11-16 NOTE — PROGRESS NOTES
Patient was discharged today into the custody of the [facility] due to having a active warrant for her arrest. No follow up appointments were made.

## 2017-11-16 NOTE — PROGRESS NOTES
Pt has been out in the day area, semi social. Has been calm, cooperative. Alert, pt took hs mediccations. Had Motrin for headache. Ambulates well.  Denies si, hi, avh.

## 2017-11-16 NOTE — PROGRESS NOTES
Progress Note  Eduardo Sharma  11/15/2017 9:27 PM  Subjective:   Admit Date:   11/12/2017      CC/ADMIT DX:       Interval History:   Reviewed overnight events and nursing notes. She has no new medical issues. I have reviewed all labs/diagnostics from the last 24hrs. ROS:   I have done a 10 point ROS and all are negative, except what is mentioned in the HPI. Dietary Nutrition Supplements: Standard High Calorie Oral Supplement  DIET GENERAL;    Medications:       vitamin D  50,000 Units Oral Weekly    OLANZapine  10 mg Oral Nightly    thiamine  100 mg Oral Daily    dicyclomine  10 mg Oral TID AC    escitalopram  10 mg Oral Daily    nicotine  1 patch Transdermal Daily           Objective:   Vitals: /88   Pulse 81   Temp 98.4 °F (36.9 °C) (Temporal)   Resp 18   Ht 5' 1\" (1.549 m)   Wt 107 lb 9.6 oz (48.8 kg)   LMP 10/20/2017   SpO2 100%   BMI 20.33 kg/m²  No intake or output data in the 24 hours ending 11/15/17 2127  General appearance: alert and cooperative with exam  Lungs: clear to auscultation bilaterally  Heart: RRR  Abdomen: soft, non-tender; bowel sounds normal; no masses,  no organomegaly  Extremities: extremities normal, atraumatic, no cyanosis or edema  Neurologic:  No obvious focal neurologic deficits. Assessment and Plan:   Principal Problem:    Substance induced mood disorder (HCC)  Active Problems:    Suicidal ideation    Methamphetamine abuse    Opioid abuse    Drug abuse    Alcohol abuse    Vit D Def    Plan:  1. Continue present medication(s)   2. She is medically stable. I will monitor for any changes or concerns. 3. Follow with Psych      Discharge planning:   her home     Reviewed treatment plans with the patient and/or family.              Electronically signed by Reymundo Montemayor MD on 11/15/2017 at 9:27 PM

## 2017-11-17 LAB
APTIMA MEDIA TYPE: ABNORMAL
CHLAMYDIA TRACHOMATIS AMPLIFIED DET: NEGATIVE
N GONORRHOEAE AMPLIFIED DET: POSITIVE
SPECIMEN SOURCE: ABNORMAL

## 2017-11-21 ENCOUNTER — HOSPITAL ENCOUNTER (INPATIENT)
Age: 27
LOS: 1 days | Discharge: LEFT BEFORE DISCHARGE | DRG: 885 | End: 2017-11-22
Attending: EMERGENCY MEDICINE | Admitting: PSYCHIATRY & NEUROLOGY
Payer: MEDICAID

## 2017-11-21 DIAGNOSIS — A59.9 TRICHIMONIASIS: ICD-10-CM

## 2017-11-21 DIAGNOSIS — N39.0 BACTERIAL UTI: ICD-10-CM

## 2017-11-21 DIAGNOSIS — A49.9 BACTERIAL UTI: ICD-10-CM

## 2017-11-21 DIAGNOSIS — R45.851 SUICIDAL IDEATION: Primary | ICD-10-CM

## 2017-11-21 LAB
ACETAMINOPHEN LEVEL: <15 UG/ML
ALBUMIN SERPL-MCNC: 4.4 G/DL (ref 3.5–5.2)
ALP BLD-CCNC: 83 U/L (ref 35–104)
ALT SERPL-CCNC: 28 U/L (ref 5–33)
AMPHETAMINE SCREEN, URINE: NEGATIVE
ANION GAP SERPL CALCULATED.3IONS-SCNC: 9 MMOL/L (ref 7–19)
AST SERPL-CCNC: 32 U/L (ref 5–32)
BACTERIA WET PREP: ABNORMAL
BACTERIA: ABNORMAL /HPF
BARBITURATE SCREEN URINE: NEGATIVE
BASOPHILS ABSOLUTE: 0 K/UL (ref 0–0.2)
BASOPHILS RELATIVE PERCENT: 0.3 % (ref 0–1)
BENZODIAZEPINE SCREEN, URINE: POSITIVE
BILIRUB SERPL-MCNC: <0.2 MG/DL (ref 0.2–1.2)
BILIRUBIN URINE: NEGATIVE
BLOOD, URINE: NEGATIVE
BUN BLDV-MCNC: 14 MG/DL (ref 6–20)
CALCIUM SERPL-MCNC: 9.2 MG/DL (ref 8.6–10)
CANNABINOID SCREEN URINE: NEGATIVE
CHLORIDE BLD-SCNC: 104 MMOL/L (ref 98–111)
CLARITY: ABNORMAL
CLUE CELLS: ABNORMAL
CO2: 29 MMOL/L (ref 22–29)
COCAINE METABOLITE SCREEN URINE: NEGATIVE
COLOR: YELLOW
CREAT SERPL-MCNC: 0.5 MG/DL (ref 0.5–0.9)
EOSINOPHILS ABSOLUTE: 0.2 K/UL (ref 0–0.6)
EOSINOPHILS RELATIVE PERCENT: 1.5 % (ref 0–5)
EPITHELIAL CELLS WET PREP: ABNORMAL
EPITHELIAL CELLS, UA: 16 /HPF (ref 0–5)
ETHANOL: <10 MG/DL (ref 0–0.08)
GFR NON-AFRICAN AMERICAN: >60
GLUCOSE BLD-MCNC: 91 MG/DL (ref 74–109)
GLUCOSE URINE: NEGATIVE MG/DL
HCG(URINE) PREGNANCY TEST: NEGATIVE
HCT VFR BLD CALC: 40.6 % (ref 37–47)
HEMOGLOBIN: 12.9 G/DL (ref 12–16)
HYALINE CASTS: 10 /HPF (ref 0–8)
KETONES, URINE: NEGATIVE MG/DL
LEUKOCYTE ESTERASE, URINE: ABNORMAL
LYMPHOCYTES ABSOLUTE: 2.2 K/UL (ref 1.1–4.5)
LYMPHOCYTES RELATIVE PERCENT: 22.5 % (ref 20–40)
Lab: ABNORMAL
MCH RBC QN AUTO: 30.9 PG (ref 27–31)
MCHC RBC AUTO-ENTMCNC: 31.8 G/DL (ref 33–37)
MCV RBC AUTO: 97.1 FL (ref 81–99)
MONOCYTES ABSOLUTE: 0.6 K/UL (ref 0–0.9)
MONOCYTES RELATIVE PERCENT: 6.3 % (ref 0–10)
NEUTROPHILS ABSOLUTE: 6.8 K/UL (ref 1.5–7.5)
NEUTROPHILS RELATIVE PERCENT: 69.1 % (ref 50–65)
NITRITE, URINE: NEGATIVE
OPIATE SCREEN URINE: NEGATIVE
PDW BLD-RTO: 13.8 % (ref 11.5–14.5)
PH UA: 7.5
PLATELET # BLD: 334 K/UL (ref 130–400)
PMV BLD AUTO: 9.8 FL (ref 9.4–12.3)
POTASSIUM SERPL-SCNC: 3.8 MMOL/L (ref 3.5–5)
PROTEIN UA: ABNORMAL MG/DL
RBC # BLD: 4.18 M/UL (ref 4.2–5.4)
RBC UA: 2 /HPF (ref 0–4)
RBC WET PREP: ABNORMAL
SALICYLATE, SERUM: <3 MG/DL (ref 3–10)
SODIUM BLD-SCNC: 142 MMOL/L (ref 136–145)
SOURCE WET PREP: ABNORMAL
SPECIFIC GRAVITY UA: 1.02
TOTAL PROTEIN: 7.7 G/DL (ref 6.6–8.7)
TRICHOMONAS PREP: ABNORMAL
UROBILINOGEN, URINE: 0.2 E.U./DL
WBC # BLD: 9.8 K/UL (ref 4.8–10.8)
WBC UA: 113 /HPF (ref 0–5)
WBC WET PREP: ABNORMAL
YEAST WET PREP: ABNORMAL

## 2017-11-21 PROCEDURE — 81025 URINE PREGNANCY TEST: CPT

## 2017-11-21 PROCEDURE — 87491 CHLMYD TRACH DNA AMP PROBE: CPT

## 2017-11-21 PROCEDURE — 99283 EMERGENCY DEPT VISIT LOW MDM: CPT | Performed by: EMERGENCY MEDICINE

## 2017-11-21 PROCEDURE — 87591 N.GONORRHOEAE DNA AMP PROB: CPT

## 2017-11-21 PROCEDURE — 87086 URINE CULTURE/COLONY COUNT: CPT

## 2017-11-21 PROCEDURE — 99285 EMERGENCY DEPT VISIT HI MDM: CPT

## 2017-11-21 PROCEDURE — G0480 DRUG TEST DEF 1-7 CLASSES: HCPCS

## 2017-11-21 PROCEDURE — 81001 URINALYSIS AUTO W/SCOPE: CPT

## 2017-11-21 PROCEDURE — 85025 COMPLETE CBC W/AUTO DIFF WBC: CPT

## 2017-11-21 PROCEDURE — 80053 COMPREHEN METABOLIC PANEL: CPT

## 2017-11-21 PROCEDURE — 1240000000 HC EMOTIONAL WELLNESS R&B

## 2017-11-21 PROCEDURE — 36415 COLL VENOUS BLD VENIPUNCTURE: CPT

## 2017-11-21 PROCEDURE — 90791 PSYCH DIAGNOSTIC EVALUATION: CPT | Performed by: PSYCHIATRY & NEUROLOGY

## 2017-11-21 PROCEDURE — 6370000000 HC RX 637 (ALT 250 FOR IP): Performed by: PSYCHIATRY & NEUROLOGY

## 2017-11-21 PROCEDURE — 80307 DRUG TEST PRSMV CHEM ANLYZR: CPT

## 2017-11-21 PROCEDURE — 6370000000 HC RX 637 (ALT 250 FOR IP): Performed by: EMERGENCY MEDICINE

## 2017-11-21 RX ORDER — ZIPRASIDONE MESYLATE 20 MG/ML
20 INJECTION, POWDER, LYOPHILIZED, FOR SOLUTION INTRAMUSCULAR EVERY 12 HOURS PRN
Status: DISCONTINUED | OUTPATIENT
Start: 2017-11-21 | End: 2017-11-22 | Stop reason: HOSPADM

## 2017-11-21 RX ORDER — ACETAMINOPHEN 325 MG/1
650 TABLET ORAL EVERY 4 HOURS PRN
Status: DISCONTINUED | OUTPATIENT
Start: 2017-11-21 | End: 2017-11-22 | Stop reason: HOSPADM

## 2017-11-21 RX ORDER — OLANZAPINE 5 MG/1
5 TABLET ORAL 2 TIMES DAILY
Status: DISCONTINUED | OUTPATIENT
Start: 2017-11-21 | End: 2017-11-22

## 2017-11-21 RX ORDER — CIPROFLOXACIN 500 MG/1
500 TABLET, FILM COATED ORAL ONCE
Status: COMPLETED | OUTPATIENT
Start: 2017-11-21 | End: 2017-11-21

## 2017-11-21 RX ORDER — METRONIDAZOLE 500 MG/1
500 TABLET ORAL ONCE
Status: COMPLETED | OUTPATIENT
Start: 2017-11-21 | End: 2017-11-21

## 2017-11-21 RX ORDER — LANOLIN ALCOHOL/MO/W.PET/CERES
6 CREAM (GRAM) TOPICAL NIGHTLY PRN
Status: DISCONTINUED | OUTPATIENT
Start: 2017-11-21 | End: 2017-11-22 | Stop reason: HOSPADM

## 2017-11-21 RX ADMIN — OLANZAPINE 5 MG: 5 TABLET, FILM COATED ORAL at 16:25

## 2017-11-21 RX ADMIN — CIPROFLOXACIN HYDROCHLORIDE 500 MG: 500 TABLET, FILM COATED ORAL at 13:36

## 2017-11-21 RX ADMIN — METRONIDAZOLE 500 MG: 500 TABLET ORAL at 13:36

## 2017-11-21 ASSESSMENT — SLEEP AND FATIGUE QUESTIONNAIRES
AVERAGE NUMBER OF SLEEP HOURS: 3
DO YOU USE A SLEEP AID: NO
RESTFUL SLEEP: NO
DIFFICULTY FALLING ASLEEP: YES
DO YOU HAVE DIFFICULTY SLEEPING: YES
DIFFICULTY ARISING: YES
DIFFICULTY STAYING ASLEEP: YES

## 2017-11-21 ASSESSMENT — PATIENT HEALTH QUESTIONNAIRE - PHQ9: SUM OF ALL RESPONSES TO PHQ QUESTIONS 1-9: 24

## 2017-11-21 ASSESSMENT — LIFESTYLE VARIABLES: HISTORY_ALCOHOL_USE: YES

## 2017-11-21 NOTE — PLAN OF CARE
Problem: Altered Mood, Depressive Behavior  Goal: STG-Knowledge of positive coping patterns  Outcome: Ongoing                                                                      Group Therapy Note    Date: 11/21/2017  Start Time: 7937  End Time:  1600  Number of Participants: 14    Type of Group: Recovery    Wellness Binder Information  Module Name:  Relapse prevention  Session Number:  1    Patient's Goal:  When I am feelings well    Notes:  Pt acknowledged felling well when they make good choices which also can help prevent relapse.     Status After Intervention:  Improved    Participation Level: Interactive    Participation Quality: Appropriate, Attentive and Sharing      Speech:  normal      Thought Process/Content: Logical      Affective Functioning: Congruent      Mood: congruent      Level of consciousness:  Alert, Oriented x4 and Attentive      Response to Learning: Able to verbalize current knowledge/experience      Endings: None Reported    Modes of Intervention: Education      Discipline Responsible: Psychoeducational Specialist      Signature:  Dulce Chávez

## 2017-11-21 NOTE — PROGRESS NOTES
[facility] Admission From ED  Nursing Admission Note    Admission Type: Voluntary    Reason for Admission: \"for pills, liquor, spice, depression, anxiety & suicidal thoughts. \"     Patient Active Problem List   Diagnosis    Suicidal ideation    Substance induced mood disorder ([facility] [address]    Methamphetamine abuse    Opioid abuse    Drug abuse    Alcohol abuse       Pt admitted from Dr. Teresa sin in ED to Adult [facility] room # 602. Arrived on unit via [facility] with security and T escort. Pt appropriately attired in hospital gown. Body assessment completed by Pamella Reynolds RN and [address] [place] [address] with no contraband discovered. All tubes, lines, and drains were appropriately discontinued by ED staff prior to pt transfer to [facility]. Pt belongings and valuables inventoried and cataloged, stored per policy. Pt oriented to surroundings, program expectations, and copy pt rights given. Received admit packet: [facility], Visitation Info, Fall Prevention, Restraints Info. Consents reviewed, signed Pt Rights, Handbook Acceptance, Visit/Call Acceptance, PHI Release, Social Info Release, and Treatment Agreement. Pt verbalizes understanding. Identifies stressors. Substance abuse.     Status and Exam  Normal: No  Facial Expression: Exaggerated  Affect: Congruent  Level of Consciousness: Alert  Mood:Normal: No  Mood: Anxious  Motor Activity:Normal: No  Motor Activity: Increased  Interview Behavior: Cooperative  Preception: Cerrillos to Person, Elfrieda Pel to Time, Cerrillos to Place, Cerrillos to Situation  Attention:Normal: No  Attention: Distractible  Thought Processes: Loose Assoc., Tangential  Thought Content:Normal: No  Thought Content: Preoccupations  Hallucinations: None  Delusions: No  Memory:Normal: No  Memory: Poor Recent  Insight and Judgment: No  Insight and Judgment: Poor Insight, Poor Judgment, Unrealistic  Present Suicidal Ideation: No  Present Homicidal Ideation: No    Yolanda Robertson RN

## 2017-11-21 NOTE — PROGRESS NOTES
Admission Note      Reason for admission/Target Symptom: increasing depression and SI  Diagnoses:  UDS: Negative- Benzo- Opiate- Amphetamines- THC- Cocaine- Barbs  BAL: Negative     SW met with treatment team to discuss patient treatment and follow up care plans. Pt was admitted to [facility]. Treatment team has  identified patients discharge needs as medication management and therapy with [address] [place] [address]. Pt validated need for appointments. Pt was also provided a handout of contact information for drug and alcohol treatment centers and other community support service such as [facility] .
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
## 2017-11-21 NOTE — PLAN OF CARE
Problem: Altered Mood, Depressive Behavior  Goal: STG-Knowledge of positive coping patterns  Outcome: Ongoing  Group Therapy Note     Date: 11/21/2017  Start Time: 1430  End Time:  1576  Number of Participants: 15     Type of Group: Cognitive Skills     Wellness Binder Information  Module Name:  Staying well   Session Number:  1     Patient's Goal:  Daily maintenance and coping skills      Notes:  Pt able to identify coping skills that are used on a daily basis     Status After Intervention:  Improved     Participation Level: Active Listener and Interactive     Participation Quality: Appropriate, Attentive and Sharing        Speech:  normal        Thought Process/Content: Logical        Affective Functioning: Congruent        Mood: congruent         Level of consciousness:  Alert, Oriented x4 and Attentive        Response to Learning: Able to verbalize current knowledge/experience, Able to verbalize/acknowledge new learning and Able to retain information        Endings: None Reported     Modes of Intervention: Education, Support and Socialization        Discipline Responsible: Psychoeducational Specialist        Signature:   Maddison Moya

## 2017-11-21 NOTE — ED PROVIDER NOTES
[facility] EMERGENCY DEPT  eMERGENCY dEPARTMENT eNCOUnter      Pt Name: Hosea Jean  MRN: [id number]  [place] 1990  Date of evaluation: 11/21/2017  Provider: Lory Severino MD    [address] [place] [address]       Chief Complaint   Patient presents with    Psychiatric Evaluation     SI         HISTORY OF PRESENT ILLNESS   (Location/Symptom, Timing/Onset, Context/Setting, Quality, Duration, Modifying Factors, Severity)  Note limiting factors. Hosea Jean is a 24 y.o. female who presents to the emergency department      The history is provided by the patient. Mental Health Problem   Presenting symptoms: suicidal thoughts (no plan)    Patient accompanied by: no one. Degree of incapacity (severity):  Severe  Onset quality:  Unable to specify  Timing:  Constant  Chronicity:  Recurrent  Context: alcohol use (\"I got ripped last night\"), drug abuse (\"spice\"), noncompliance (reports no Rx for meds after recent d/c) and stressful life event (got out of penitentiary yesterday, homeless)    Treatment compliance:  Untreated  Relieved by:  Nothing  Worsened by:  Nothing  Ineffective treatments:  None tried  Associated symptoms: poor judgment    Associated symptoms comment:  Reports \"yellow\" vaginal d/c. Wants tested for STDs  Risk factors: hx of suicide attempts        Nursing Notes were reviewed. REVIEW OF SYSTEMS    (2-9 systems for level 4, 10 or more for level 5)     Review of Systems   Psychiatric/Behavioral: Positive for suicidal ideas (no plan). All other systems reviewed and are negative. Except as noted above the remainder of the review of systems was reviewed and negative.        PAST MEDICAL HISTORY     Past Medical History:   Diagnosis Date    Anxiety     Bipolar 1 disorder ([facility] [address]     Depression          SURGICAL HISTORY       Past Surgical History:   Procedure Laterality Date    TONSILLECTOMY AND ADENOIDECTOMY           CURRENT MEDICATIONS       Previous Medications    ESCITALOPRAM (LEXAPRO) 10 MG TABLET    Take 1 Options  Bacterial UTI:   Suicidal ideation:   Trichimoniasis:   Diagnosis management comments: GC/Chlamydia pending. Psych intake sees/accepts. Instructed how to treat UTI/Trich. CRITICAL CARE TIME   Total Critical Care time was 0 minutes, excluding separately reportable procedures. There was a high probability of clinically significant/life threatening deterioration in the patient's condition which required my urgent intervention. CONSULTS:  IP CONSULT TO PSYCHIATRY    PROCEDURES:  Unless otherwise noted below, none     Procedures    FINAL IMPRESSION      1. Suicidal ideation    2. Trichimoniasis    3. Bacterial UTI          DISPOSITION/PLAN   DISPOSITION     PATIENT REFERRED TO:  No follow-up provider specified.     DISCHARGE MEDICATIONS:  New Prescriptions    No medications on file          (Please note that portions of this note were completed with a voice recognition program.  Efforts were made to edit the dictations but occasionally words are mis-transcribed.)    Rafaela Lacy MD (electronically signed)  Attending Emergency Physician        Rafaela Lacy MD  11/21/17 [id number]

## 2017-11-22 VITALS
HEART RATE: 92 BPM | TEMPERATURE: 97.9 F | WEIGHT: 99 LBS | DIASTOLIC BLOOD PRESSURE: 90 MMHG | BODY MASS INDEX: 18.69 KG/M2 | SYSTOLIC BLOOD PRESSURE: 128 MMHG | RESPIRATION RATE: 18 BRPM | HEIGHT: 61 IN | OXYGEN SATURATION: 100 %

## 2017-11-22 PROBLEM — F32.A DEPRESSION: Status: ACTIVE | Noted: 2017-11-22

## 2017-11-22 PROCEDURE — 6370000000 HC RX 637 (ALT 250 FOR IP): Performed by: PSYCHIATRY & NEUROLOGY

## 2017-11-22 PROCEDURE — 99238 HOSP IP/OBS DSCHRG MGMT 30/<: CPT | Performed by: PSYCHIATRY & NEUROLOGY

## 2017-11-22 RX ORDER — OLANZAPINE 5 MG/1
5 TABLET ORAL DAILY
Status: DISCONTINUED | OUTPATIENT
Start: 2017-11-23 | End: 2017-11-22 | Stop reason: HOSPADM

## 2017-11-22 RX ORDER — METRONIDAZOLE 500 MG/1
2000 TABLET ORAL ONCE
Qty: 4 TABLET | Refills: 0 | Status: SHIPPED | OUTPATIENT
Start: 2017-11-22 | End: 2017-11-22

## 2017-11-22 RX ADMIN — OLANZAPINE 5 MG: 5 TABLET, FILM COATED ORAL at 08:54

## 2017-11-22 RX ADMIN — ACETAMINOPHEN 650 MG: 325 TABLET, FILM COATED ORAL at 05:30

## 2017-11-22 ASSESSMENT — PAIN SCALES - GENERAL: PAINLEVEL_OUTOF10: 2
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
## 2017-11-22 NOTE — PROGRESS NOTES
Pt denies si, hi, avh. Has been in the bed, reports that she was up all day and didn't want to go to group, but \"wanted to crash. \" pt pleasant, calm and cooperative.

## 2017-11-22 NOTE — PLAN OF CARE
Problem: Altered Mood, Depressive Behavior  Goal: LTG-Able to verbalize acceptance of life and situations over which he or she has no control  Outcome: Ongoing                                                                      Group Therapy Note    Date: 11/22/2017  Start Time: 1100  End Time:  0850  Number of Participants: 13    Type of Group: Psychoeducation    Wellness Binder Information  Module Name:  Staying well   Session Number:  1    Patient's Goal:  Daily maintenance and coping skills    Notes:  ROLF I invited and encouraged pt to attend group, pt refused/declined. ROLF I gently reminded pt that group is a part of treatment, pt continued to decline, and ROLF I informed nursing staff of non-compliance. Alternative therapeutic educational coping skills activity was provided. Status After Intervention:      Participation Level:     Participation Quality:       Speech:         Thought Process/Content:       Affective Functioning:       Mood:       Level of consciousness:        Response to Learning:       Endings:     Modes of Intervention:       Discipline Responsible: /Counselor      Signature:  Kaykay Villarreal

## 2017-11-22 NOTE — PROGRESS NOTES
Group Therapy Note    Start Time: 900  End Time:  813  Number of Participants: 18    Type of Group: Community Meeting       Patient's Goal:  \"getting to get ok\"      Notes:      Participation Level:  Active Listener       Participation Quality: Attentive      Thought Process/Content: Linear      Affective Functioning: Incongruent      Mood: irritable      Level of consciousness:  Drowsy      Modes of Intervention: Support      Discipline Responsible: Behavioral Health Tech II      Signature:  Jakob Escobar

## 2017-11-22 NOTE — PROGRESS NOTES
Nutrition Assessment    Type and Reason for Visit: Initial, Positive Nutrition Screen    Nutrition Recommendations: Obtain accurate wt for adequate nutritional assessment    Malnutrition Assessment:  · Malnutrition Status: At risk for malnutrition  · Context: Social or environmental circumstances  · Findings of the 6 clinical characteristics of malnutrition (Minimum of 2 out of 6 clinical characteristics is required to make the diagnosis of moderate or severe Protein Calorie Malnutrition based on AND/ASPEN Guidelines):  1. Energy Intake-Greater than 75%,      2. Weight Loss-7.5% loss or greater,  (10 days if stated wt is accurate)  3. Fat Loss-Unable to assess,    4. Muscle Loss-Unable to assess,    5. Fluid Accumulation-No significant fluid accumulation,    6.  Strength-Not measured    Nutrition Diagnosis:   · Problem: Unintended weight loss  · Etiology: related to Psychological cause/life stress     Signs and symptoms:  as evidenced by Patient report of    Nutrition Assessment:  · Subjective Assessment: MST 2. Wt is a stated wt and if accurate represents a 7.5% wt loss in 10 days. Pt is currently within a healthy wt range, as evidenced by BMI. Labs indicate adequate nutritional status. Will continue to monitor intake and wt. Will recommend an actual wt for adequate nutritional review. · Nutrition-Focused Physical Findings:    · Wound Type: None  · Current Nutrition Therapies:  · Oral Diet Orders: General   · Oral Diet intake: 51-75%  · Oral Nutrition Supplement (ONS) Orders: None  · Anthropometric Measures:  · Ht: 5' 1\" (154.9 cm)   · Admission Body Wt: 99 lb (44.9 kg)  · Ideal Body Wt: 105 lb (47.6 kg), % Ideal Body 94.3%  · BMI Classification: BMI 18.5 - 24.9 Normal Weight    Estimated Intake vs Estimated Needs: Intake Meets Needs    Nutrition Risk Level:  Moderate    Nutrition Interventions:   Continue current diet  Continued Inpatient Monitoring    Nutrition Evaluation:   · Evaluation: Goals set

## 2017-11-22 NOTE — PLAN OF CARE
Problem: Nutrition  Goal: Optimal nutrition therapy  Outcome: Ongoing  Nutrition Problem: Unintended weight loss  Intervention: Food and/or Nutrient Delivery: Continue current diet  Nutritional Goals: Pt will consume % of meals

## 2017-11-22 NOTE — PLAN OF CARE
Problem: Altered Mood, Depressive Behavior  Goal: STG-Knowledge of positive coping patterns  Outcome: Ongoing                                                                      Group Therapy Note    Date: 11/22/2017  Start Time: 1000  End Time:  3007  Number of Participants: 14    Type of Group: Psychoeducation    Wellness Binder Information  Module Name:  Social wellness  Session Number:  1    Patient's Goal:  Your support network    Notes:  Pt was verbally prompted to attend group. Pt refused. Information about support network was provided. Status After Intervention:      Participation Level:     Participation Quality:       Speech:         Thought Process/Content:       Affective Functioning:       Mood:       Level of consciousness:        Response to Learning:       Endings:     Modes of Intervention:       Discipline Responsible: Psychoeducational Specialist      Signature:  Rcahel Barksdale

## 2017-11-22 NOTE — PROGRESS NOTES
Patient at this time denies SI, HI & AVH, patient is complaint and cooperative with care. Patient is social and attends groups. Patient is cooperative with care and medication. Patient rates anxiety on a scale of 1-10,10. Rates depression on a scale of 1-10,10.  Electronically signed by Yee Helton RN on 11/22/2017 at 2:13 PM

## 2017-11-23 LAB — URINE CULTURE, ROUTINE: NORMAL

## 2017-11-24 LAB
APTIMA MEDIA TYPE: NORMAL
CHLAMYDIA TRACHOMATIS AMPLIFIED DET: NEGATIVE
N GONORRHOEAE AMPLIFIED DET: NEGATIVE
SPECIMEN SOURCE: NORMAL

## 2017-11-29 NOTE — BH NOTE
[phone]      [facility]        Address: [address] [place] [address]   Phone: [phone]   Important information for a smoker        SMOKING: QUIT SMOKING.  THIS IS THE MOST IMPORTANT ACTION YOU CAN TAKE TO IMPROVE YOUR CURRENT AND FUTURE HEALTH.      Call the National Quit Smoking Support Hot Line at [place] NOW (426-6307)     Smoking harms nonsmokers. When nonsmokers are around people who smoke, they absorb nicotine, carbon monoxide, and other ingredients of tobacco smoke.      DO NOT SMOKE AROUND CHILDREN      Children exposed to secondhand smoke are at an increased risk of:  Sudden Infant Death Syndrome (SIDS), acute respiratory infections, inflammation of the middle ear, and severe asthma. Over a longer time, it causes heart disease and lung cancer. There is no safe level of exposure to secondhand smoke.                The information on all pages of the After Visit Summary has been reviewed with me, the patient and/or responsible adult, by my health care provider(s). I had the opportunity to ask questions regarding this information. I understand I should dispose of my armband safely at home to protect my health information. A complete copy of the After Visit Summary has been given to me, the patient and/or responsible adult. Patient Signature/Responsible Adult: ____________________________________________________________   Date/Time: ____________________________________________________________   Clinician Signature: ____________________________________________________________   Date/Time: ____________________________________________________________   Transmitted to next level of Care: ____________________________________________________________   Date/Time/Signature: ____________________________________________________________         MyChart Sign-Up     Send messages to your doctor, view your test results, renew your prescriptions, schedule appointments, and more.    Go to tried to do it. This is much different from having fleeting thoughts of death, which many people have from time to time. Your doctor and support team will work hard to help keep you safe. Your team may include a , a , and a counselor. Most people who think about suicide don't want to die. They think suicide will end their problems and pain. People who consider suicide often feel hopeless, helpless, and worthless. These thoughts can make a person feel that there is no other choice. But you do have a choice. Help is always available. The doctor and staff members are taking you and your pain very seriously. It is important to remember that there are people who are willing and able to talk with you about your suicidal thoughts. Treatment and close follow-up care can help you feel better about life. Thoughts of hopelessness and suicide may come from being depressed. Depression is a medical condition. When you have depression, there may be problems with activity levels in certain parts of your brain. Chemicals in your brain called neurotransmitters may be out of balance. But depression can be treated. Treatment for depression includes counseling, medicines, and lifestyle changes. With treatment, you can feel better. Your doctor doesn't want you to hurt yourself. He or she may ask you to sign a \"no harm\" agreement or contract. This contract is your promise that you will not hurt yourself between now and your next visit. Be completely honest with your doctor if you feel that you can't sign it. You will get help. Follow-up care is a key part of your treatment and safety. Be sure to make and go to all appointments, and call your doctor if you are having problems. It's also a good idea to know your test results and keep a list of the medicines you take. How can you care for yourself at home? ? Talk to someone. Reach out to family members, friends, your doctor, or a counselor.  Be open and honest information.

## 2017-11-30 NOTE — BH NOTE
Behavioral Health-[facility]   Friday Dec 1, 2017   Intake/therapy at 11:00 am with Ruslan Anders, please bring a photo id, soc sec card and insurance card  Mu Castro. [facility]   [address] [place] [address], [address]   Phone [phone]   Fax [phone]   CRISIS LINE: [phone]       Dec5 Go to [facility]   Tuesday Dec 5, 2017   Medication management appt at 11:30 am with  Trinh, please bring a current list of medications  Mu Castro. [facility]   [address] [place] [address], [address]   Phone [phone]   Fax [phone]   CRISIS LINE: [phone]       You are allergic to the following     You are allergic to the following   Allergen Reactions   Amoxicillin Rash       Pcn (Penicillins) Other (See Comments)   'I have no idea. \"    Your Latest Vitals          Blood Pressure 128/90              BMI 18.71              Weight 99 lb              Height 5' 1\"              Temperature (Temporal) 97.9 °F              Pulse 92              Respiration 18              Oxygen Saturation 100%              BSA 1.39 m²               Daily Medication List (This medication list can be shared with any healthcare provider who is helping you manage your medications)     as of 11/22/2017  3:27 PM   These are the medications you have told us you were taking at home STOP taking them after you leave the hospital     These are the medications you have told us you were taking at home STOP taking them after you leave the hospital   ergocalciferol 68454 units capsule   Commonly known as: ERGOCALCIFEROL    escitalopram 10 MG tablet   Commonly known as: LEXAPRO    OLANZapine 10 MG tablet   Commonly known as: ZYPREXA           Where to  your medications         these medications at [facility] [phone]      metroNIDAZOLE        Address: [address] [place] [address] [place] [address]   Phone: seriously. It is important to remember that there are people who are willing and able to talk with you about your suicidal thoughts. Treatment and close follow-up care can help you feel better about life. Thoughts of hopelessness and suicide may come from being depressed. Depression is a medical condition. When you have depression, there may be problems with activity levels in certain parts of your brain. Chemicals in your brain called neurotransmitters may be out of balance. But depression can be treated. Treatment for depression includes counseling, medicines, and lifestyle changes. With treatment, you can feel better. Your doctor doesn't want you to hurt yourself. He or she may ask you to sign a \"no harm\" agreement or contract. This contract is your promise that you will not hurt yourself between now and your next visit. Be completely honest with your doctor if you feel that you can't sign it. You will get help. Follow-up care is a key part of your treatment and safety. Be sure to make and go to all appointments, and call your doctor if you are having problems. It's also a good idea to know your test results and keep a list of the medicines you take. How can you care for yourself at home? ? Talk to someone. Reach out to family members, friends, your doctor, or a counselor. Be open and honest with them about your thoughts and feelings. ? Be safe with medicines. Take your medicines exactly as prescribed. Call your doctor if you think you are having a problem with your medicine. ? Avoid illegal drugs and alcohol. ? Attend all counseling sessions recommended by your doctor. ? Have someone take away sharp or dangerous objects, guns, and drugs from your home. ? Keep the numbers for these national suicide hotlines: 0-211-029-TALK (4-679-239-787.109.7515) and 2-477-IDSYRZX (8-719.425.9574). When should you call for help? Call 911 anytime you think you may need emergency care. For example, call if:  ?  You feel you

## 2019-12-30 ENCOUNTER — HOSPITAL ENCOUNTER (INPATIENT)
Age: 29
LOS: 7 days | Discharge: HOME | DRG: 885 | End: 2020-01-06
Attending: EMERGENCY MEDICINE | Admitting: FAMILY MEDICINE
Payer: COMMERCIAL

## 2019-12-30 LAB
ALBUMIN SERPL-MCNC: 4.5 G/DL (ref 3.5–5.2)
ALP BLD-CCNC: 64 U/L (ref 35–104)
ALT SERPL-CCNC: 18 U/L (ref 5–33)
AMPHETAMINE SCREEN, URINE: POSITIVE
ANION GAP SERPL CALCULATED.3IONS-SCNC: 13 MMOL/L (ref 7–19)
AST SERPL-CCNC: 27 U/L (ref 5–32)
BACTERIA: ABNORMAL /HPF
BARBITURATE SCREEN URINE: NEGATIVE
BASOPHILS ABSOLUTE: 0 K/UL (ref 0–0.2)
BASOPHILS RELATIVE PERCENT: 0.3 % (ref 0–1)
BENZODIAZEPINE SCREEN, URINE: NEGATIVE
BILIRUB SERPL-MCNC: 0.4 MG/DL (ref 0.2–1.2)
BILIRUBIN URINE: NEGATIVE
BLOOD, URINE: NEGATIVE
BUN BLDV-MCNC: 15 MG/DL (ref 6–20)
CALCIUM SERPL-MCNC: 9.4 MG/DL (ref 8.6–10)
CANNABINOID SCREEN URINE: POSITIVE
CHLORIDE BLD-SCNC: 99 MMOL/L (ref 98–111)
CLARITY: CLEAR
CO2: 30 MMOL/L (ref 22–29)
COCAINE METABOLITE SCREEN URINE: NEGATIVE
COLOR: YELLOW
CREAT SERPL-MCNC: 0.6 MG/DL (ref 0.5–0.9)
EOSINOPHILS ABSOLUTE: 0.1 K/UL (ref 0–0.6)
EOSINOPHILS RELATIVE PERCENT: 1.2 % (ref 0–5)
EPITHELIAL CELLS, UA: 1 /HPF (ref 0–5)
ETHANOL: <10 MG/DL (ref 0–0.08)
GFR NON-AFRICAN AMERICAN: >60
GLUCOSE BLD-MCNC: 67 MG/DL (ref 74–109)
GLUCOSE URINE: NEGATIVE MG/DL
HCG QUALITATIVE: NEGATIVE
HCT VFR BLD CALC: 43.5 % (ref 37–47)
HEMOGLOBIN: 14.2 G/DL (ref 12–16)
HYALINE CASTS: 21 /HPF (ref 0–8)
IMMATURE GRANULOCYTES #: 0 K/UL
KETONES, URINE: NEGATIVE MG/DL
LEUKOCYTE ESTERASE, URINE: ABNORMAL
LYMPHOCYTES ABSOLUTE: 2.1 K/UL (ref 1.1–4.5)
LYMPHOCYTES RELATIVE PERCENT: 19.5 % (ref 20–40)
Lab: ABNORMAL
MCH RBC QN AUTO: 32.1 PG (ref 27–31)
MCHC RBC AUTO-ENTMCNC: 32.6 G/DL (ref 33–37)
MCV RBC AUTO: 98.2 FL (ref 81–99)
MONOCYTES ABSOLUTE: 0.6 K/UL (ref 0–0.9)
MONOCYTES RELATIVE PERCENT: 5.6 % (ref 0–10)
NEUTROPHILS ABSOLUTE: 7.7 K/UL (ref 1.5–7.5)
NEUTROPHILS RELATIVE PERCENT: 73.2 % (ref 50–65)
NITRITE, URINE: POSITIVE
OPIATE SCREEN URINE: NEGATIVE
PDW BLD-RTO: 13 % (ref 11.5–14.5)
PH UA: 6 (ref 5–8)
PLATELET # BLD: 370 K/UL (ref 130–400)
PMV BLD AUTO: 10.3 FL (ref 9.4–12.3)
POTASSIUM SERPL-SCNC: 2.8 MMOL/L (ref 3.5–5)
PROTEIN UA: 30 MG/DL
RBC # BLD: 4.43 M/UL (ref 4.2–5.4)
RBC UA: 1 /HPF (ref 0–4)
SODIUM BLD-SCNC: 142 MMOL/L (ref 136–145)
SPECIFIC GRAVITY UA: 1.03 (ref 1–1.03)
TOTAL PROTEIN: 7.6 G/DL (ref 6.6–8.7)
URINE REFLEX TO CULTURE: YES
UROBILINOGEN, URINE: 0.2 E.U./DL
WBC # BLD: 10.5 K/UL (ref 4.8–10.8)
WBC UA: 33 /HPF (ref 0–5)

## 2019-12-30 PROCEDURE — 6360000002 HC RX W HCPCS: Performed by: EMERGENCY MEDICINE

## 2019-12-30 PROCEDURE — 87186 SC STD MICRODIL/AGAR DIL: CPT

## 2019-12-30 PROCEDURE — 96365 THER/PROPH/DIAG IV INF INIT: CPT

## 2019-12-30 PROCEDURE — 87086 URINE CULTURE/COLONY COUNT: CPT

## 2019-12-30 PROCEDURE — G0480 DRUG TEST DEF 1-7 CLASSES: HCPCS

## 2019-12-30 PROCEDURE — 36415 COLL VENOUS BLD VENIPUNCTURE: CPT

## 2019-12-30 PROCEDURE — 80053 COMPREHEN METABOLIC PANEL: CPT

## 2019-12-30 PROCEDURE — 85025 COMPLETE CBC W/AUTO DIFF WBC: CPT

## 2019-12-30 PROCEDURE — 1240000000 HC EMOTIONAL WELLNESS R&B

## 2019-12-30 PROCEDURE — 6370000000 HC RX 637 (ALT 250 FOR IP): Performed by: EMERGENCY MEDICINE

## 2019-12-30 PROCEDURE — 87077 CULTURE AEROBIC IDENTIFY: CPT

## 2019-12-30 PROCEDURE — 80307 DRUG TEST PRSMV CHEM ANLYZR: CPT

## 2019-12-30 PROCEDURE — 84703 CHORIONIC GONADOTROPIN ASSAY: CPT

## 2019-12-30 PROCEDURE — 99285 EMERGENCY DEPT VISIT HI MDM: CPT

## 2019-12-30 PROCEDURE — 81001 URINALYSIS AUTO W/SCOPE: CPT

## 2019-12-30 RX ORDER — POTASSIUM CHLORIDE 20 MEQ/1
40 TABLET, EXTENDED RELEASE ORAL ONCE
Status: COMPLETED | OUTPATIENT
Start: 2019-12-30 | End: 2019-12-30

## 2019-12-30 RX ORDER — ACETAMINOPHEN 325 MG/1
650 TABLET ORAL EVERY 4 HOURS PRN
Status: DISCONTINUED | OUTPATIENT
Start: 2019-12-30 | End: 2020-01-06 | Stop reason: HOSPADM

## 2019-12-30 RX ORDER — NITROFURANTOIN 25; 75 MG/1; MG/1
100 CAPSULE ORAL ONCE
Status: COMPLETED | OUTPATIENT
Start: 2019-12-30 | End: 2019-12-30

## 2019-12-30 RX ORDER — POTASSIUM CHLORIDE 7.45 MG/ML
10 INJECTION INTRAVENOUS ONCE
Status: COMPLETED | OUTPATIENT
Start: 2019-12-30 | End: 2019-12-30

## 2019-12-30 RX ORDER — TRAZODONE HYDROCHLORIDE 50 MG/1
50 TABLET ORAL ONCE
Status: DISCONTINUED | OUTPATIENT
Start: 2019-12-30 | End: 2020-01-06 | Stop reason: HOSPADM

## 2019-12-30 RX ADMIN — NITROFURANTOIN MONOHYDRATE/MACROCRYSTALLINE 100 MG: 25; 75 CAPSULE ORAL at 19:24

## 2019-12-30 RX ADMIN — POTASSIUM CHLORIDE 40 MEQ: 1500 TABLET, EXTENDED RELEASE ORAL at 19:54

## 2019-12-30 RX ADMIN — POTASSIUM CHLORIDE 10 MEQ: 7.46 INJECTION, SOLUTION INTRAVENOUS at 19:54

## 2019-12-30 ASSESSMENT — SLEEP AND FATIGUE QUESTIONNAIRES
DO YOU USE A SLEEP AID: NO
AVERAGE NUMBER OF SLEEP HOURS: 8
DO YOU HAVE DIFFICULTY SLEEPING: NO

## 2019-12-30 ASSESSMENT — PATIENT HEALTH QUESTIONNAIRE - PHQ9: SUM OF ALL RESPONSES TO PHQ QUESTIONS 1-9: 18

## 2019-12-30 ASSESSMENT — LIFESTYLE VARIABLES: HISTORY_ALCOHOL_USE: NO

## 2019-12-31 PROBLEM — F15.20 METHAMPHETAMINE USE DISORDER, SEVERE (HCC): Status: ACTIVE | Noted: 2017-11-13

## 2019-12-31 PROBLEM — F79 INTELLECTUAL DISABILITY: Status: ACTIVE | Noted: 2019-12-31

## 2019-12-31 PROBLEM — F31.32 BIPOLAR AFFECTIVE DISORDER, CURRENTLY DEPRESSED, MODERATE (HCC): Status: ACTIVE | Noted: 2019-12-31

## 2019-12-31 PROBLEM — F32.A DEPRESSION: Status: ACTIVE | Noted: 2019-12-31

## 2019-12-31 PROBLEM — F19.20 SYNTHETIC CANNABINOID DEPENDENCE (HCC): Status: ACTIVE | Noted: 2019-12-31

## 2019-12-31 PROBLEM — F34.9 PERSISTENT MOOD DISORDER (HCC): Status: ACTIVE | Noted: 2017-11-22

## 2019-12-31 PROBLEM — F34.9 PERSISTENT MOOD DISORDER (HCC): Status: RESOLVED | Noted: 2017-11-22 | Resolved: 2019-12-31

## 2019-12-31 PROCEDURE — 1240000000 HC EMOTIONAL WELLNESS R&B

## 2019-12-31 PROCEDURE — 6370000000 HC RX 637 (ALT 250 FOR IP): Performed by: PSYCHIATRY & NEUROLOGY

## 2019-12-31 PROCEDURE — 6370000000 HC RX 637 (ALT 250 FOR IP): Performed by: NURSE PRACTITIONER

## 2019-12-31 RX ORDER — TRAZODONE HYDROCHLORIDE 50 MG/1
50 TABLET ORAL NIGHTLY PRN
Status: DISCONTINUED | OUTPATIENT
Start: 2019-12-31 | End: 2020-01-06 | Stop reason: HOSPADM

## 2019-12-31 RX ORDER — CITALOPRAM 20 MG/1
20 TABLET ORAL DAILY
Status: DISCONTINUED | OUTPATIENT
Start: 2019-12-31 | End: 2020-01-06 | Stop reason: HOSPADM

## 2019-12-31 RX ORDER — NICOTINE 21 MG/24HR
1 PATCH, TRANSDERMAL 24 HOURS TRANSDERMAL DAILY
Status: DISCONTINUED | OUTPATIENT
Start: 2019-12-31 | End: 2020-01-06 | Stop reason: HOSPADM

## 2019-12-31 RX ADMIN — CITALOPRAM HYDROBROMIDE 20 MG: 20 TABLET ORAL at 16:51

## 2019-12-31 RX ADMIN — ACETAMINOPHEN 650 MG: 325 TABLET ORAL at 22:36

## 2019-12-31 RX ADMIN — DIVALPROEX SODIUM 750 MG: 500 TABLET, FILM COATED, EXTENDED RELEASE ORAL at 16:52

## 2019-12-31 ASSESSMENT — PAIN SCALES - GENERAL: PAINLEVEL_OUTOF10: 7
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
## 2019-12-31 NOTE — PROGRESS NOTES
Patient is not social and does not attend groups. Patient isolates to room and sleeps and only gets ou fo bed to eat her meals. Patient did not complete ADLs. Patient did not rate depression or anxiety. Patient is irritable and evasive.

## 2019-12-31 NOTE — PROGRESS NOTES
[facility] Admission From ED  Nursing Admission Note    Admission Type: Voluntary    Reason for Admission: SI    Patient Active Problem List   Diagnosis    Suicidal ideation    Substance induced mood disorder ([facility]    Methamphetamine abuse ([facility]    Opioid abuse ([facility]    Drug abuse ([facility]    Alcohol abuse    Depression       Pt admitted from Dr. Alfonzo Tellez 's care in ED to [facility] room 06/608-02. Arrived on unit via  [address] with . Pt appropriately attired in paper scrubs. Body assessment completed by Louis Duarte with no contraband discovered. All tubes, lines, and drains were appropriately discontinued by ED staff prior to pt transfer to [facility]. Pt belongings and valuables inventoried and cataloged, stored per policy. Pt oriented to surroundings, program expectations, and copy pt rights given. Received admit packet: [facility], Visitation Info, Fall Prevention, Restraints Info. Consents reviewed, signed Pt Rights, Handbook Acceptance, Visit/Call Acceptance, PHI Release, Social Info Release, and Treatment Agreement. Pt verbalizes understanding. Pt is a smoker? yes,  Pt offered Nicotine patch yes,   Pt refused Nicotine patch? yes,      Identifies stressors.  Child going into foster care , drugs     Status and Exam  Normal: No  Facial Expression: Worried  Affect: Appropriate  Level of Consciousness: Alert  Mood:Normal: No  Mood: Depressed, Anxious  Motor Activity:Normal: Yes  Interview Behavior: Cooperative  Attention:Normal: Yes  Thought Content:Normal: Yes  Hallucinations: None  Delusions: No  Memory:Normal: Yes  Insight and Judgment: Yes  Present Suicidal Ideation: No  Present Homicidal Ideation: No    C/o:    Notes:
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
## 2019-12-31 NOTE — PLAN OF CARE
Group Therapy Note     Date: 12/31/2019  Start Time: 1100  End Time:  1197  Number of Participants: 7     Type of Group: Psychotherapy     Wellness Binder Information  Module Name:  emotional wellness  Session Number:  5     Patient's Goal:  obstacles to emotional wellness     Notes:  pt was verbally prompted to attend group. Pt refused. Information about obstacles to wellness was provided. Status After Intervention:       Participation Level:      Participation Quality:         Speech:           Thought Process/Content:         Affective Functioning:         Mood:         Level of consciousness:          Response to Learning:         Endings:      Modes of Intervention:         Discipline Responsible: Psychoeducational Specialist        Signature:  Glenn Verdugo

## 2019-12-31 NOTE — PROGRESS NOTES
Group Therapy Note    Start Time: 520  End Time:  747  Number of Participants: 7    Type of Group: Community Meeting       Patient's Goal:        Notes:  Patient didn't attend group    Participation Level:       Participation Quality:        Thought Process/Content:       Affective Functioning:       Mood:       Level of consciousness:        Modes of Intervention: Support      Discipline Responsible: Behavioral Health Tech II      Signature:  Fracisco Holguin

## 2019-12-31 NOTE — PLAN OF CARE
Problem: Altered Mood, Depressive Behavior:  Goal: Able to verbalize acceptance of life and situations over which he or she has no control  Description  Able to verbalize acceptance of life and situations over which he or she has no control  Outcome: Ongoing  Goal: Able to verbalize and/or display a decrease in depressive symptoms  Description  Able to verbalize and/or display a decrease in depressive symptoms  Outcome: Ongoing  Goal: Ability to disclose and discuss suicidal ideas will improve  Description  Ability to disclose and discuss suicidal ideas will improve  Outcome: Ongoing  Goal: Able to verbalize support systems  Description  Able to verbalize support systems  Outcome: Ongoing  Goal: Absence of self-harm  Description  Absence of self-harm  Outcome: Ongoing  Goal: Patient specific goal  Description  Patient specific goal  Outcome: Ongoing  Goal: Participates in care planning  Description  Participates in care planning  Outcome: Ongoing
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
## 2019-12-31 NOTE — PLAN OF CARE
Group Therapy Note    Date: 12/31/2019  Start Time: 1000  End Time:  4502  Number of Participants: 7    Type of Group: Psychoeducation    Wellness Binder Information  Module Name:  [address] [place] [address]  Session Number:  1    Group Goal for Pt: To improve knowledge of practical facts about depression    Notes:  Pt did not attend group discussion. Pt was invited/encouraged. Status After Intervention:      Participation Level:     Participation Quality:       Speech:         Thought Process/Content:       Affective Functioning:       Mood:       Level of consciousness:        Response to Learning:       Endings:     Modes of Intervention:       Discipline Responsible:       Signature:  Leigh Pack

## 2019-12-31 NOTE — PLAN OF CARE
Group Therapy Note     Date: 12/31/2019  Start Time: 1430  End Time:  4685  Number of Participants: 5     Type of Group: Cognitive Skills     Wellness Binder Information  Module Name:  1445  Session Number:  3986     Patient's Goal:  daily maintenance and coping skills     Notes:  pt was verbally prompted to attend group. Pt refused. Information about coping skills was provided. Status After Intervention:       Participation Level:      Participation Quality:         Speech:           Thought Process/Content:         Affective Functioning:         Mood:         Level of consciousness:          Response to Learning:         Endings:      Modes of Intervention:         Discipline Responsible: Psychoeducational Specialist        Signature:  Sandra Stephenson

## 2020-01-01 LAB
ORGANISM: ABNORMAL
URINE CULTURE, ROUTINE: ABNORMAL
URINE CULTURE, ROUTINE: ABNORMAL

## 2020-01-01 PROCEDURE — 6370000000 HC RX 637 (ALT 250 FOR IP): Performed by: FAMILY MEDICINE

## 2020-01-01 PROCEDURE — 99231 SBSQ HOSP IP/OBS SF/LOW 25: CPT | Performed by: NURSE PRACTITIONER

## 2020-01-01 PROCEDURE — 6370000000 HC RX 637 (ALT 250 FOR IP): Performed by: PSYCHIATRY & NEUROLOGY

## 2020-01-01 PROCEDURE — 6370000000 HC RX 637 (ALT 250 FOR IP): Performed by: NURSE PRACTITIONER

## 2020-01-01 PROCEDURE — 1240000000 HC EMOTIONAL WELLNESS R&B

## 2020-01-01 RX ORDER — SULFAMETHOXAZOLE AND TRIMETHOPRIM 800; 160 MG/1; MG/1
1 TABLET ORAL EVERY 12 HOURS SCHEDULED
Status: DISCONTINUED | OUTPATIENT
Start: 2020-01-01 | End: 2020-01-06 | Stop reason: HOSPADM

## 2020-01-01 RX ORDER — ONDANSETRON 4 MG/1
4 TABLET, FILM COATED ORAL EVERY 6 HOURS PRN
Status: DISCONTINUED | OUTPATIENT
Start: 2020-01-01 | End: 2020-01-06 | Stop reason: HOSPADM

## 2020-01-01 RX ADMIN — ACETAMINOPHEN 650 MG: 325 TABLET ORAL at 07:07

## 2020-01-01 RX ADMIN — CITALOPRAM HYDROBROMIDE 20 MG: 20 TABLET ORAL at 08:46

## 2020-01-01 RX ADMIN — SULFAMETHOXAZOLE AND TRIMETHOPRIM 1 TABLET: 800; 160 TABLET ORAL at 21:00

## 2020-01-01 RX ADMIN — TRAZODONE HYDROCHLORIDE 50 MG: 50 TABLET ORAL at 21:00

## 2020-01-01 RX ADMIN — SULFAMETHOXAZOLE AND TRIMETHOPRIM 1 TABLET: 800; 160 TABLET ORAL at 08:46

## 2020-01-01 RX ADMIN — ONDANSETRON HYDROCHLORIDE 4 MG: 4 TABLET, FILM COATED ORAL at 10:33

## 2020-01-01 RX ADMIN — DIVALPROEX SODIUM 750 MG: 500 TABLET, FILM COATED, EXTENDED RELEASE ORAL at 08:46

## 2020-01-01 ASSESSMENT — PAIN SCALES - GENERAL: PAINLEVEL_OUTOF10: 10

## 2020-01-01 NOTE — PLAN OF CARE
Problem: Altered Mood, Depressive Behavior:  Goal: Able to verbalize acceptance of life and situations over which he or she has no control  Description  Able to verbalize acceptance of life and situations over which he or she has no control  Outcome: Ongoing  Goal: Able to verbalize and/or display a decrease in depressive symptoms  Description  Able to verbalize and/or display a decrease in depressive symptoms  Outcome: Ongoing  Goal: Ability to disclose and discuss suicidal ideas will improve  Description  Ability to disclose and discuss suicidal ideas will improve  Outcome: Ongoing  Goal: Able to verbalize support systems  Description  Able to verbalize support systems  Outcome: Ongoing  Goal: Absence of self-harm  Description  Absence of self-harm  Outcome: Ongoing  Goal: Patient specific goal  Description  Patient specific goal  Outcome: Ongoing  Goal: Participates in care planning  Description  Participates in care planning  Outcome: Ongoing     Problem: Nutrition  Goal: Optimal nutrition therapy  Description  Nutrition Problem: Severe malnutrition  Intervention: Food and/or Nutrient Delivery: Continue current diet  Nutritional Goals: po intake 75% or greater.   Weight slowly increase 1-3# per week     Outcome: Ongoing

## 2020-01-01 NOTE — PROGRESS NOTES
family history. Vital Signs:  Last set of tests and vitals:  Vitals:    01/01/20 0743   BP: 113/79   Pulse: 63   Resp: 20   Temp: 95.7 °F (35.4 °C)   SpO2: 100%          Mental Status:  Level of consciousness:  within normal limits and awake  Appearance:  well-appearing, street clothes, in chair, fair grooming and fair hygiene  Behavior/Motor:  no abnormalities noted  Attitude toward examiner:  cooperative, attentive and good eye contact  Speech:  normal rate and normal volume  Mood:  \" I have been sick all night. \"  Affect:  flat  Thought processes:  linear and goal directed  Thought content:  Homocidal ideation :denies  Suicidal Ideation:  denies suicidal ideation  Delusions:  no evidence of delusions  Perceptual Disturbance:  denies any perceptual disturbance  Cognition:  oriented to person, place, and time  Concentration : good  Memory intact for recent and remote  Fund of knowledge:  average  Abstract thinking:  adequate  Insight:  fair  Judgment:  good     sulfamethoxazole-trimethoprim  1 tablet Oral 2 times per day    nicotine  1 patch Transdermal Daily    divalproex  750 mg Oral Daily    citalopram  20 mg Oral Daily    traZODone  50 mg Oral Once       Current Medications:  Current Facility-Administered Medications   Medication Dose Route Frequency Provider Last Rate Last Dose    ondansetron (ZOFRAN) tablet 4 mg  4 mg Oral Q6H PRN Sakshi Ocasio MD        sulfamethoxazole-trimethoprim (BACTRIM DS;SEPTRA DS) 800-160 MG per tablet 1 tablet  1 tablet Oral 2 times per day Emily Andre MD        nicotine (NICODERM CQ) 14 MG/24HR 1 patch  1 patch Transdermal Daily VEINTA Hollingsworth   1 patch at 12/31/19 1652    divalproex (DEPAKOTE ER) extended release tablet 750 mg  750 mg Oral Daily VENITA Hollingsworth   750 mg at 12/31/19 1652    citalopram (CELEXA) tablet 20 mg  20 mg Oral Daily VENITA Hollingsworth   20 mg at 12/31/19 1651    traZODone (DESYREL) tablet 50 mg  50 mg Oral Nightly PRN VENITA Mason        traZODone (DESYREL) tablet 50 mg  50 mg Oral Once Alethea Sicard, MD        acetaminophen (TYLENOL) tablet 650 mg  650 mg Oral Q4H PRN Alethea Sicard, MD   650 mg at 01/01/20 0707    magnesium hydroxide (MILK OF MAGNESIA) 400 MG/5ML suspension 30 mL  30 mL Oral Daily PRN Alethea Sicard, MD           Psychotherapy:   SUPPORTIVE    DSM V Diagnoses:      Principal Problem:    Bipolar affective disorder, currently depressed, moderate ([address]  Active Problems:    Synthetic cannabinoid dependence ([address]    Intellectual disability    Methamphetamine use disorder, severe ([address]    Depression  Resolved Problems:    Persistent mood disorder ([address]            Plan:    Encourage group therapy  15 minute safety checks  Medical monitoring by Dr. Steve Montalvo and associates  Continue current therapy and medications   Zofran 4 mg po every 8 hours prn for nausea and vomiting     Amount of time spent with patient:  15 minutes with greater than 50% of the time spent in counseling and collaboration of care.     VENITA Mason  Clinician Signature: signed electronically

## 2020-01-01 NOTE — H&P
HISTORY and PHYSICAL      CHIEF COMPLAINT:  Depression, SI    Reason for Admission:  Depression, SI    History Obtained From:  patient    HISTORY OF PRESENT ILLNESS:      The patient is a 34 y.o. female who is admitted to the [place] [address] unit with worsening mood issues. She has no c/o CP or SOA. No abdominal pain or N/V. No dysuria. No weakness or HA. She c/o being tired. No new pain issues. No fevers. Past Medical History:        Diagnosis Date    Anxiety     Bipolar 1 disorder ([facility] [address]     Depression      Past Surgical History:        Procedure Laterality Date    TONSILLECTOMY AND ADENOIDECTOMY           Medications Prior to Admission:    No medications prior to admission. Allergies:  Amoxicillin and Pcn [penicillins]    Social History:   TOBACCO:   reports that she has been smoking. She has been smoking about 0.25 packs per day. She has never used smokeless tobacco.  ETOH:   reports current alcohol use. DRUGS:   reports current drug use. Drug: Methamphetamines. MARITAL STATUS:  Not   OCCUPATION:  Not workin  Patient currently lives alone      Family History:   History reviewed. No pertinent family history. REVIEW OF SYSTEMS:  Constitutional: neg  CV: neg  Pulmonary: neg  GI: neg  : neg  Psych: depression, SI  Neuro: neg  Skin: neg  MusculoSkeletal: neg  HEENT: neg  Joints: neg    Vitals:  /86   Pulse 80   Temp 96.8 °F (36 °C) (Temporal)   Resp 18   Ht 5' 6\" (1.676 m)   Wt 93 lb 12.8 oz (42.5 kg)   LMP 12/15/2019   SpO2 98%   BMI 15.14 kg/m²     PHYSICAL EXAM:  Gen: NAD, alert  HEENT: WNL  Lymph: no LAD  Neck: no JVD or masses  Chest: CTA bilat  CV: RRR  Abdomen: NT/ND  Extrem: no C/C/E  Neuro: non focal  Skin: no rashes  Joints: no redness    DATA:  I have reviewed the admission labs and imaging tests.     ASSESSMENT AND PLAN:      Principal Problem:    Bipolar affective disorder, currently depressed, moderate, SI---follow with Psych    Methamphetamine use disorder, severe, THC Use    Intellectual disability    Hypokalemia---recheck labs in am        Luis Zabala MD  7:24 AM 1/1/2020

## 2020-01-01 NOTE — PROGRESS NOTES
[facility] Adult Unit Daily Assessment  Nursing Progress Note    Room: [facility]-01   Name: Nish Prakash   Age: 34 y.o. Gender: female   Dx: Bipolar affective disorder, currently depressed, moderate (HCC)  Precautions: suicide risk  Inpatient Status: voluntary       SLEEP:    Sleep Quality Fair  Sleep Medications: No   PRN Sleep Meds: No       MEDICAL:    Other PRN Meds: Yes   Med Compliant: Yes  Accu-Chek: No  Oxygen/CPAP/BiPAP: No  CIWA/CINA: No   PAIN Assessment: headaches  Side Effects from medication: No      PSYCH:    Depression: Did not riate  Anxiety: Did not rate  SI LI   HI LI   AVH:LI      GENERAL:    Appetite: decreased    Social: No   Speech: pressured   Appearance: appropriately dressed and healthy looking    GROUP:    Group Participation: No  Participation Quality: None    Notes: patient isolative and stays in her room. Will continue to monitor.         Electronically signed by Isra Matias RN on 12/31/19 at 10:48 PM

## 2020-01-01 NOTE — PLAN OF CARE
Group Therapy Note     Date: 1/1/2020  Start Time: 1430  End Time:  5012  Number of Participants: 8     Type of Group: Cognitive Skills     Wellness Binder Information  Module Name:  emotional wellness  Session Number:  1     Patient's Goal:  validation of feelings     Notes:  pt was verbally prompted to attend group. Pt refused. Information about validation of feelings was provided. Status After Intervention:       Participation Level:      Participation Quality:         Speech:           Thought Process/Content:         Affective Functioning:         Mood:         Level of consciousness:          Response to Learning:         Endings:      Modes of Intervention:         Discipline Responsible: Psychoeducational Specialist        Signature:  Julia Dick

## 2020-01-01 NOTE — PROGRESS NOTES
Group Therapy Note    Start Time:815  End Time:  890  Number of Participants: 10    Type of Group: Community Meeting       Patient's Goal:        Notes:  Patient attended group but didn't fill sheet out    Participation Level:  Active Listener       Participation Quality: Appropriate      Thought Process/Content: Logical      Affective Functioning: Congruent      Mood: calm      Level of consciousness:  Alert      Modes of Intervention: Support      Discipline Responsible: Behavioral Health Tech II      Signature:  Alisa Chaudhari

## 2020-01-01 NOTE — PLAN OF CARE
Group Therapy Note     Date: 1/1/2020  Start Time: 9030  End Time:  1600  Number of Participants: 8     Type of Group: Recovery     Wellness Binder Information  Module Name:  reducing relapse  Session Number:  2     Patient's Goal:  early warning signs     Notes:  pt was verbally prompted to attend group. Pt refused. Information about reducing relapse was provided. Status After Intervention:       Participation Level:      Participation Quality:         Speech:           Thought Process/Content:         Affective Functioning:         Mood:         Level of consciousness:          Response to Learning:         Endings:      Modes of Intervention:         Discipline Responsible: Psychoeducational Specialist        Signature:  Cheryl Dickson

## 2020-01-01 NOTE — PLAN OF CARE
Group Therapy Note     Date: 1/1/2020  Start Time: 1100  End Time:  7120  Number of Participants: 5     Type of Group: Psychoeducation     Wellness Binder Information  Module Name:  staying well  Session Number:  1     Patient's Goal:  daily maintenance and coping skills     Notes:  pt was verbally prompted to attend group. Pt refused. Information about coping skills was provided. Status After Intervention:       Participation Level:      Participation Quality:         Speech:           Thought Process/Content:         Affective Functioning:         Mood:         Level of consciousness:          Response to Learning:         Endings:      Modes of Intervention:         Discipline Responsible: Psychoeducational Specialist        Signature:  Paty Barajas

## 2020-01-02 PROBLEM — E43 SEVERE MALNUTRITION (HCC): Status: ACTIVE | Noted: 2020-01-02

## 2020-01-02 LAB
ANION GAP SERPL CALCULATED.3IONS-SCNC: 13 MMOL/L (ref 7–19)
BUN BLDV-MCNC: 16 MG/DL (ref 6–20)
CALCIUM SERPL-MCNC: 8.9 MG/DL (ref 8.6–10)
CHLORIDE BLD-SCNC: 102 MMOL/L (ref 98–111)
CO2: 25 MMOL/L (ref 22–29)
CREAT SERPL-MCNC: 0.7 MG/DL (ref 0.5–0.9)
GFR NON-AFRICAN AMERICAN: >60
GLUCOSE BLD-MCNC: 88 MG/DL (ref 74–109)
MAGNESIUM: 2.3 MG/DL (ref 1.6–2.6)
POTASSIUM SERPL-SCNC: 4.4 MMOL/L (ref 3.5–5)
SODIUM BLD-SCNC: 140 MMOL/L (ref 136–145)
TSH REFLEX FT4: 0.76 UIU/ML (ref 0.35–5.5)
VITAMIN B-12: 598 PG/ML (ref 211–946)
VITAMIN D 25-HYDROXY: 21.7 NG/ML

## 2020-01-02 PROCEDURE — 6370000000 HC RX 637 (ALT 250 FOR IP): Performed by: PSYCHIATRY & NEUROLOGY

## 2020-01-02 PROCEDURE — 82306 VITAMIN D 25 HYDROXY: CPT

## 2020-01-02 PROCEDURE — 36415 COLL VENOUS BLD VENIPUNCTURE: CPT

## 2020-01-02 PROCEDURE — 99231 SBSQ HOSP IP/OBS SF/LOW 25: CPT | Performed by: NURSE PRACTITIONER

## 2020-01-02 PROCEDURE — 6370000000 HC RX 637 (ALT 250 FOR IP): Performed by: NURSE PRACTITIONER

## 2020-01-02 PROCEDURE — 80048 BASIC METABOLIC PNL TOTAL CA: CPT

## 2020-01-02 PROCEDURE — 6370000000 HC RX 637 (ALT 250 FOR IP): Performed by: FAMILY MEDICINE

## 2020-01-02 PROCEDURE — 84443 ASSAY THYROID STIM HORMONE: CPT

## 2020-01-02 PROCEDURE — 1240000000 HC EMOTIONAL WELLNESS R&B

## 2020-01-02 PROCEDURE — 83735 ASSAY OF MAGNESIUM: CPT

## 2020-01-02 PROCEDURE — 82607 VITAMIN B-12: CPT

## 2020-01-02 RX ORDER — ERGOCALCIFEROL 1.25 MG/1
50000 CAPSULE ORAL WEEKLY
Status: DISCONTINUED | OUTPATIENT
Start: 2020-01-02 | End: 2020-01-06 | Stop reason: HOSPADM

## 2020-01-02 RX ORDER — ERGOCALCIFEROL 1.25 MG/1
50000 CAPSULE ORAL WEEKLY
Qty: 11 CAPSULE | Refills: 0 | Status: SHIPPED | OUTPATIENT
Start: 2020-01-02 | End: 2020-01-06

## 2020-01-02 RX ADMIN — ACETAMINOPHEN 650 MG: 325 TABLET ORAL at 14:14

## 2020-01-02 RX ADMIN — CITALOPRAM HYDROBROMIDE 20 MG: 20 TABLET ORAL at 08:15

## 2020-01-02 RX ADMIN — TRAZODONE HYDROCHLORIDE 50 MG: 50 TABLET ORAL at 20:53

## 2020-01-02 RX ADMIN — SULFAMETHOXAZOLE AND TRIMETHOPRIM 1 TABLET: 800; 160 TABLET ORAL at 08:16

## 2020-01-02 RX ADMIN — ACETAMINOPHEN 650 MG: 325 TABLET ORAL at 08:14

## 2020-01-02 RX ADMIN — SULFAMETHOXAZOLE AND TRIMETHOPRIM 1 TABLET: 800; 160 TABLET ORAL at 20:53

## 2020-01-02 RX ADMIN — ONDANSETRON HYDROCHLORIDE 4 MG: 4 TABLET, FILM COATED ORAL at 14:14

## 2020-01-02 RX ADMIN — ERGOCALCIFEROL 50000 UNITS: 1.25 CAPSULE ORAL at 14:14

## 2020-01-02 RX ADMIN — DIVALPROEX SODIUM 750 MG: 500 TABLET, FILM COATED, EXTENDED RELEASE ORAL at 08:15

## 2020-01-02 ASSESSMENT — PAIN - FUNCTIONAL ASSESSMENT
PAIN_FUNCTIONAL_ASSESSMENT: PREVENTS OR INTERFERES SOME ACTIVE ACTIVITIES AND ADLS
PAIN_FUNCTIONAL_ASSESSMENT: 0-10

## 2020-01-02 ASSESSMENT — PAIN SCALES - GENERAL
PAINLEVEL_OUTOF10: 10

## 2020-01-02 ASSESSMENT — PAIN DESCRIPTION - DESCRIPTORS: DESCRIPTORS: HEADACHE

## 2020-01-02 NOTE — PROGRESS NOTES
Group Therapy Note    Start Time: 900  End Time:  015  Number of Participants: 10    Type of Group: Community Meeting       Patient's Goal:        Notes:  Patient didn't attend group    Participation Level:  Active Listener       Participation Quality: Appropriate      Thought Process/Content: Logical      Affective Functioning: Congruent      Mood: calm      Level of consciousness:  Alert      Modes of Intervention: Support      Discipline Responsible: Behavioral Health Tech II      Signature:  Candido Cage

## 2020-01-02 NOTE — PROGRESS NOTES
SW gave patient application to [facility] in [facility].

## 2020-01-02 NOTE — PLAN OF CARE
Nutrition Problem: Severe malnutrition  Intervention: Food and/or Nutrient Delivery: Continue current diet, Continue current ONS  Nutritional Goals: po intake 75% or greater.   Weight slowly increase 1-3# per week

## 2020-01-02 NOTE — PROGRESS NOTES
Nutrition Assessment    Type and Reason for Visit: Reassess    Nutrition Assessment: Pt is Severely Malnourished AEB fat and muscle loss. Pt is improving from a nutritional standpoint with PO intake meeting nutritional needs. Will continue to monitor nutritional progression and implement further nutrition intervention as needed. Malnutrition Assessment:  · Malnutrition Status: Meets the criteria for severe malnutrition    Nutrition Risk Level: High    Objective Information:  · Current Nutrition Therapies:  · Oral Diet Orders: General   · Oral Diet intake: 51-75%  · Anthropometric Measures:  · Ht: 5' 6\" (167.6 cm)   · Current Body Wt: 93 lb 12 oz (42.5 kg)  · BMI Classification: BMI <18.5 Underweight    Nutrition Interventions:   Continue current diet, Continue current ONS  Continued Inpatient Monitoring    Nutrition Evaluation:   · Evaluation: Progressing toward goals   · Goals: po intake 75% or greater.   Weight slowly increase 1-3# per week    · Monitoring: Meal Intake, Diet Tolerance, Skin Integrity, Weight, Pertinent Labs      Electronically signed by Keisha Mckee, MS, RD, LD on 1/2/20 at 3:09 PM    Contact Number: [phone]

## 2020-01-02 NOTE — PROGRESS NOTES
[facility] Adult Unit Daily Assessment  Nursing Progress Note    Room: [facility]/601-01   Name: Wilbert Issa   Age: 34 y.o. Gender: female   Dx: Bipolar affective disorder, currently depressed, moderate (HCC)  Precautions: suicide risk  Inpatient Status: voluntary       SLEEP:    Sleep Quality Good  Sleep Medications: No   PRN Sleep Meds: Yes       MEDICAL:    Other PRN Meds: No   Med Compliant: Yes  Accu-Chek: No  Oxygen/CPAP/BiPAP: No  CIWA/CINA: No   PAIN Assessment: none  Side Effects from medication: No      PSYCH:    Depression: 5   Anxiety: 8   SI denies suicidal ideation   HI Negative for homicidal ideation      AVH:Absent      GENERAL:    Appetite: poor  Social: No   Speech: normal   Appearance: appropriately dressed and healthy looking    GROUP:    Group Participation: Yes  Participation Quality: Interactive    Notes:   Pt alert, oriented,calm and cooperative with staff. Patient has been isolating to her room this evening. Pt states she has been having n/v all day but that she is not nauseous at this time. Patient encouraged to eat something before she takes her antibiotic.no obvious s/s of distress voiced or noted.  Will continue to monitor.           Electronically signed by Lalo Bell RN on 1/1/20 at 10:41 PM
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
## 2020-01-02 NOTE — PROGRESS NOTES
history. Vital Signs:  Last set of tests and vitals:  Vitals:    01/02/20 0722   BP: (!) 111/58   Pulse: 69   Resp: 18   Temp: 96.9 °F (36.1 °C)   SpO2: 98%          Mental Status:  Level of consciousness:  within normal limits and awake  Appearance:  well-appearing, street clothes, lying in bed, fair grooming and good hygiene  Behavior/Motor:  no abnormalities noted  Attitude toward examiner:  cooperative, attentive and good eye contact  Speech:  normal rate and normal volume  Mood:  \"I am a little irritable. \"  Affect:  mood congruent  Thought processes:  linear and goal directed  Thought content:  Homocidal ideation :denies  Suicidal Ideation:  denies suicidal ideation  Delusions:  no evidence of delusions  Perceptual Disturbance:  denies any perceptual disturbance  Cognition:  oriented to person, place, and time  Concentration : fair  Memory intact for recent and remote  Fund of knowledge:  average  Abstract thinking:  adequate  Insight:  fair  Judgment:  fair     sulfamethoxazole-trimethoprim  1 tablet Oral 2 times per day    nicotine  1 patch Transdermal Daily    divalproex  750 mg Oral Daily    citalopram  20 mg Oral Daily    traZODone  50 mg Oral Once       Current Medications:  Current Facility-Administered Medications   Medication Dose Route Frequency Provider Last Rate Last Dose    ondansetron (ZOFRAN) tablet 4 mg  4 mg Oral Q6H PRN Kalpana Gonzalez MD   4 mg at 01/01/20 1033    sulfamethoxazole-trimethoprim (BACTRIM DS;SEPTRA DS) 800-160 MG per tablet 1 tablet  1 tablet Oral 2 times per day Lori Hernandes MD   1 tablet at 01/02/20 0816    nicotine (NICODERM CQ) 14 MG/24HR 1 patch  1 patch Transdermal Daily London Colace, APRN   1 patch at 01/02/20 0816    divalproex (DEPAKOTE ER) extended release tablet 750 mg  750 mg Oral Daily London Colace, APRN   750 mg at 01/02/20 0815    citalopram (CELEXA) tablet 20 mg  20 mg Oral Daily London Colace, APRN   20 mg at 01/02/20 7805  traZODone (DESYREL) tablet 50 mg  50 mg Oral Nightly PRN VENITA Curran   50 mg at 01/01/20 2100    traZODone (DESYREL) tablet 50 mg  50 mg Oral Once Lisa Leal MD        acetaminophen (TYLENOL) tablet 650 mg  650 mg Oral Q4H PRN Lisa Leal MD   650 mg at 01/02/20 0814    magnesium hydroxide (MILK OF MAGNESIA) 400 MG/5ML suspension 30 mL  30 mL Oral Daily PRN Lisa Leal MD           Psychotherapy:   SUPPORTIVE    DSM V Diagnoses:        Principal Problem:    Bipolar affective disorder, currently depressed, moderate ([address]  Active Problems:    Synthetic cannabinoid dependence ([address]    Intellectual disability    Methamphetamine use disorder, severe ([address]    Depression  Resolved Problems:    Persistent mood disorder ([address]            Plan:    Encourage group therapy  15 minute safety checks  Medical monitoring by Dr. Gisele Feliciano and associates  Continue current therapy and medications  Social work to assist with CD treatment    Amount of time spent with patient:  15 minutes with greater than 50% of the time spent in counseling and collaboration of care.     VENITA Curran  Clinician Signature: signed electronically

## 2020-01-02 NOTE — PROGRESS NOTES
Group Note    Number of Participants in Group: 8  Number of Patients on Unit:11  Patient attended group:Yes  Reason for Absence:  Intervention for patient absence:        Type of Group:   Wrap-Up/Relaxation    Patient's Goal:    Participation Level: Active Listener, Interactive, Monopolizing, Minimal and None           Patient Response to Learning: Yes    Patient's Behavior: Cooperative         Notes/Comments:    Pt filled out wrap up sheet.          Electronically signed by Les Virk RN on 1/1/20 at 10:28 PM

## 2020-01-03 LAB
CHOLESTEROL, TOTAL: 87 MG/DL (ref 160–199)
HBA1C MFR BLD: 5.3 % (ref 4–6)
HDLC SERPL-MCNC: 38 MG/DL (ref 65–121)
LDL CHOLESTEROL CALCULATED: 33 MG/DL
TRIGL SERPL-MCNC: 79 MG/DL (ref 0–149)

## 2020-01-03 PROCEDURE — 36415 COLL VENOUS BLD VENIPUNCTURE: CPT

## 2020-01-03 PROCEDURE — 87081 CULTURE SCREEN ONLY: CPT

## 2020-01-03 PROCEDURE — 6370000000 HC RX 637 (ALT 250 FOR IP): Performed by: PSYCHIATRY & NEUROLOGY

## 2020-01-03 PROCEDURE — 83036 HEMOGLOBIN GLYCOSYLATED A1C: CPT

## 2020-01-03 PROCEDURE — 80061 LIPID PANEL: CPT

## 2020-01-03 PROCEDURE — 6370000000 HC RX 637 (ALT 250 FOR IP): Performed by: NURSE PRACTITIONER

## 2020-01-03 PROCEDURE — 1240000000 HC EMOTIONAL WELLNESS R&B

## 2020-01-03 PROCEDURE — 6370000000 HC RX 637 (ALT 250 FOR IP): Performed by: FAMILY MEDICINE

## 2020-01-03 PROCEDURE — 87491 CHLMYD TRACH DNA AMP PROBE: CPT

## 2020-01-03 PROCEDURE — 87591 N.GONORRHOEAE DNA AMP PROB: CPT

## 2020-01-03 PROCEDURE — 99231 SBSQ HOSP IP/OBS SF/LOW 25: CPT | Performed by: NURSE PRACTITIONER

## 2020-01-03 RX ORDER — FLUCONAZOLE 150 MG/1
150 TABLET ORAL ONCE
Status: COMPLETED | OUTPATIENT
Start: 2020-01-03 | End: 2020-01-03

## 2020-01-03 RX ADMIN — DIVALPROEX SODIUM 750 MG: 500 TABLET, FILM COATED, EXTENDED RELEASE ORAL at 08:37

## 2020-01-03 RX ADMIN — SULFAMETHOXAZOLE AND TRIMETHOPRIM 1 TABLET: 800; 160 TABLET ORAL at 20:51

## 2020-01-03 RX ADMIN — TRAZODONE HYDROCHLORIDE 50 MG: 50 TABLET ORAL at 20:51

## 2020-01-03 RX ADMIN — SULFAMETHOXAZOLE AND TRIMETHOPRIM 1 TABLET: 800; 160 TABLET ORAL at 08:37

## 2020-01-03 RX ADMIN — CITALOPRAM HYDROBROMIDE 20 MG: 20 TABLET ORAL at 08:37

## 2020-01-03 RX ADMIN — ONDANSETRON HYDROCHLORIDE 4 MG: 4 TABLET, FILM COATED ORAL at 14:33

## 2020-01-03 RX ADMIN — FLUCONAZOLE 150 MG: 150 TABLET ORAL at 16:55

## 2020-01-03 ASSESSMENT — PAIN SCALES - GENERAL: PAINLEVEL_OUTOF10: 0

## 2020-01-03 NOTE — PROGRESS NOTES
After being asked again if she would allow her blood to be drawn pt began yelling that \"I have been here 4 days and have only been to one group. I'm still sick, I've been on spice and methamphetamines. I'm not leaving here until I go to rehab. I came here to detox.  This is a place for suicide so write this down, I am suicidal, today I am suicidal.\"

## 2020-01-03 NOTE — PROGRESS NOTES
Smoker    Packs/day: 0.25   Smokeless Tobacco Never Used        Family History:     History reviewed. No pertinent family history. Vital Signs:  Last set of tests and vitals:  Vitals:    01/03/20 0804   BP: 119/74   Pulse: 81   Resp: 18   Temp: 97.5 °F (36.4 °C)   SpO2: 97%          Mental Status:  Level of consciousness:  within normal limits and awake  Appearance:  well-appearing, street clothes, in chair, good grooming and good hygiene  Behavior/Motor:  no abnormalities noted  Attitude toward examiner:  cooperative, attentive and good eye contact  Speech:  normal rate and normal volume  Mood:  \" I am still tired. \"  Affect:  mood congruent  Thought processes:  linear and goal directed  Thought content:  Homocidal ideation : denies  Suicidal Ideation:  denies suicidal ideation  Delusions:  no evidence of delusions  Perceptual Disturbance:  denies any perceptual disturbance  Cognition:  oriented to person, place, and time  Concentration : fair  Memory intact for recent and remote  Fund of knowledge:  average  Abstract thinking:  adequate  Insight:  fair  Judgment:  good     vitamin D  50,000 Units Oral Weekly    sulfamethoxazole-trimethoprim  1 tablet Oral 2 times per day    nicotine  1 patch Transdermal Daily    divalproex  750 mg Oral Daily    citalopram  20 mg Oral Daily    traZODone  50 mg Oral Once       Current Medications:  Current Facility-Administered Medications   Medication Dose Route Frequency Provider Last Rate Last Dose    vitamin D (ERGOCALCIFEROL) capsule 50,000 Units  50,000 Units Oral Weekly Aidan Berrios MD   50,000 Units at 01/02/20 1414    ondansetron (ZOFRAN) tablet 4 mg  4 mg Oral Q6H PRN Owen Fountain MD   4 mg at 01/02/20 1414    sulfamethoxazole-trimethoprim (BACTRIM DS;SEPTRA DS) 800-160 MG per tablet 1 tablet  1 tablet Oral 2 times per day Aidan Berrios MD   1 tablet at 01/03/20 0837    nicotine (NICODERM CQ) 14 MG/24HR 1 patch  1 patch Transdermal Daily Beulah Sanchez VENITA Franklin   1 patch at 01/03/20 0863    divalproex (DEPAKOTE ER) extended release tablet 750 mg  750 mg Oral Daily Annalee Julian APRN   750 mg at 01/03/20 8839    citalopram (CELEXA) tablet 20 mg  20 mg Oral Daily Lorreuben Julian, APRN   20 mg at 01/03/20 5924    traZODone (DESYREL) tablet 50 mg  50 mg Oral Nightly PRN Annalee Julian APRN   50 mg at 01/02/20 2053    traZODone (DESYREL) tablet 50 mg  50 mg Oral Once Dee De La Rosa MD        acetaminophen (TYLENOL) tablet 650 mg  650 mg Oral Q4H PRN Dee De La Rosa MD   650 mg at 01/02/20 1414    magnesium hydroxide (MILK OF MAGNESIA) 400 MG/5ML suspension 30 mL  30 mL Oral Daily PRN Dee De La Rosa MD           Psychotherapy:   SUPPORTIVE    DSM V Diagnoses:        Principal Problem:    Bipolar affective disorder, currently depressed, moderate ([address]  Active Problems:    Synthetic cannabinoid dependence ([address]    Intellectual disability    Methamphetamine use disorder, severe ([address]    Depression    Severe malnutrition ([address]  Resolved Problems:    Persistent mood disorder ([address]            Plan:    Encourage group therapy  15 minute safety checks  Medical monitoring by Dr. Glen Angelo and associates  Continue current therapy and medications  Discharge to [facility] on Monday     Amount of time spent with patient:  15 minutes with greater than 50% of the time spent in counseling and collaboration of care.     VENITA Park  Clinician Signature: signed electronically

## 2020-01-03 NOTE — PROGRESS NOTES
[facility] Adult Unit Daily Assessment  Nursing Progress Note     Room: [facility]601-01   Name: Cheri Muller   Age: 34 y.o. Gender: female   Dx: Bipolar affective disorder, currently depressed, moderate ([address]  Precautions: suicide risk  Inpatient Status: voluntary         SLEEP:     Sleep Quality Good  Sleep Medications: No   PRN Sleep Meds: Yes         MEDICAL:     Other PRN Meds: No   Med Compliant: Yes  Accu-Chek: No  Oxygen/CPAP/BiPAP: No  CIWA/CINA: No   PAIN Assessment: none  Side Effects from medication: None reported        PSYCH:     Depression: denies  Anxiety: denies  SI denies suicidal ideation   HI Negative for homicidal ideation      AVH:Absent        GENERAL:     Appetite: poor  Social: No   Speech: normal   Appearance: appropriately dressed and healthy looking     GROUP:     Group Participation: Yes  Participation Quality: Interactive     Notes: Pt calm and cooperative, A&Ox4 at med pass. Pt in her room the majority of the evening, very little interaction with peers. Pt is asking to go home tomorrow.

## 2020-01-04 PROCEDURE — 6370000000 HC RX 637 (ALT 250 FOR IP): Performed by: NURSE PRACTITIONER

## 2020-01-04 PROCEDURE — 1240000000 HC EMOTIONAL WELLNESS R&B

## 2020-01-04 PROCEDURE — 99233 SBSQ HOSP IP/OBS HIGH 50: CPT | Performed by: PSYCHIATRY & NEUROLOGY

## 2020-01-04 PROCEDURE — 6370000000 HC RX 637 (ALT 250 FOR IP): Performed by: FAMILY MEDICINE

## 2020-01-04 RX ADMIN — SULFAMETHOXAZOLE AND TRIMETHOPRIM 1 TABLET: 800; 160 TABLET ORAL at 07:41

## 2020-01-04 RX ADMIN — CITALOPRAM HYDROBROMIDE 20 MG: 20 TABLET ORAL at 07:41

## 2020-01-04 RX ADMIN — TRAZODONE HYDROCHLORIDE 50 MG: 50 TABLET ORAL at 20:24

## 2020-01-04 RX ADMIN — SULFAMETHOXAZOLE AND TRIMETHOPRIM 1 TABLET: 800; 160 TABLET ORAL at 20:23

## 2020-01-04 NOTE — PROGRESS NOTES
Progress Note  Dandy Villanueva  1/4/2020 12:54 PM  Subjective:   Admit Date:   12/30/2019      CC/ADMIT DX:       Interval History:   Reviewed overnight events and nursing notes. She has no new physical complaints. I have reviewed all labs/diagnostics from the last 24hrs. ROS:   I have done a 10 point ROS and all are negative, except what is mentioned in the HPI. Dietary Nutrition Supplements: Standard High Calorie Oral Supplement  DIET GENERAL;    Medications:      vitamin D  50,000 Units Oral Weekly    sulfamethoxazole-trimethoprim  1 tablet Oral 2 times per day    nicotine  1 patch Transdermal Daily    divalproex  750 mg Oral Daily    citalopram  20 mg Oral Daily    traZODone  50 mg Oral Once           Objective:   Vitals: /62   Pulse 79   Temp 97.9 °F (36.6 °C) (Temporal)   Resp 20   Ht 5' 6\" (1.676 m)   Wt 93 lb 12.8 oz (42.5 kg)   LMP 12/15/2019   SpO2 95%   BMI 15.14 kg/m²  No intake or output data in the 24 hours ending 01/04/20 1254  General appearance: alert and cooperative with exam  Lungs: clear to auscultation bilaterally  Heart: RRR  Abdomen: soft, non-tender; bowel sounds normal; no masses,  no organomegaly  Extremities: extremities normal, atraumatic, no cyanosis or edema  Neurologic:  No obvious focal neurologic deficits. Assessment and Plan:   Principal Problem:    Bipolar affective disorder, currently depressed, moderate (HCC)  Active Problems:    Methamphetamine use disorder, severe ([address]    Synthetic cannabinoid dependence ([address]    Intellectual disability    Depression    Severe malnutrition (HCC)  Resolved Problems:    Persistent mood disorder (HCC)    Vit D Def   Vaginitis    Plan:  1. Continue present medication(s)   2. Follow for culture results  3. Follow with Psych      Discharge planning:   her home     Reviewed treatment plans with the patient and/or family.              Electronically signed by Ariella Jim MD on 1/4/2020 at 12:54 PM

## 2020-01-04 NOTE — GROUP NOTE
Group Therapy Note    Date: 1/4/2020    Group Start Time: 1400  Group End Time: 1500  Group Topic: Cognitive Skills    [facility]    Stefany Eddy             Patient's Goal: [facility]    Notes: Pt was unable to attend group due to her altered mental status.       Discipline Responsible: Psychoeducational Specialist      Signature:  Stefany Eddy

## 2020-01-04 NOTE — PROGRESS NOTES
Patient is up and dressed in [facility] scrubs. She is disheveled and hair is uncombed. She did come out and eat breakfast, and went back to bed. She refused for the lab to draw her Depakote Level, stating \" my arms are too sore\". She reports she does not want to go to [facility] in [place] [facility], stating she has already been there and it doesn't help. She is asking about [facility] in [address] [facility] for long term treatment. Dr. Lynne Pearce is aware that patient refused her Depakote level to be drawn this am.  Patient refused 2nd  attempt for Depakote draw. She refused Depakote po this am. \" iI m not taking any more pills or getting any more blood drawn\". She came to the desk and asking to \" sign out\". She changed her mind and wants to stay until she can go to rehab. She has recently been evicted from her house in [place] and reports she can stay there until sometime in January.

## 2020-01-04 NOTE — PROGRESS NOTES
Progress Note  Philippe An  1/3/2020 11:43 PM  Subjective:   Admit Date:   12/30/2019      CC/ADMIT DX:       Interval History:   Reviewed overnight events and nursing notes. She is c/o vaginal d/c, and itching. I have reviewed all labs/diagnostics from the last 24hrs. ROS:   I have done a 10 point ROS and all are negative, except what is mentioned in the HPI. Dietary Nutrition Supplements: Standard High Calorie Oral Supplement  DIET GENERAL;    Medications:      vitamin D  50,000 Units Oral Weekly    sulfamethoxazole-trimethoprim  1 tablet Oral 2 times per day    nicotine  1 patch Transdermal Daily    divalproex  750 mg Oral Daily    citalopram  20 mg Oral Daily    traZODone  50 mg Oral Once           Objective:   Vitals: /65   Pulse 80   Temp 98.3 °F (36.8 °C) (Temporal)   Resp 18   Ht 5' 6\" (1.676 m)   Wt 93 lb 12.8 oz (42.5 kg)   LMP 12/15/2019   SpO2 97%   BMI 15.14 kg/m²  No intake or output data in the 24 hours ending 01/03/20 5783  General appearance: alert and cooperative with exam  Lungs: clear to auscultation bilaterally  Heart: RRR  Abdomen: soft, non-tender; bowel sounds normal; no masses,  no organomegaly  Extremities: extremities normal, atraumatic, no cyanosis or edema  Neurologic:  No obvious focal neurologic deficits. Assessment and Plan:   Principal Problem:    Bipolar affective disorder, currently depressed, moderate (HCC)  Active Problems:    Methamphetamine use disorder, severe ([address]    Synthetic cannabinoid dependence ([address]    Intellectual disability    Depression    Severe malnutrition (HCC)  Resolved Problems:    Persistent mood disorder (HCC)    Vit D Def   Vaginitis    Plan:  1. Continue present medication(s)   2. Treat wit Diflucan  3. Obtain cultures  4. Follow with Psych      Discharge planning:   her home     Reviewed treatment plans with the patient and/or family.              Electronically signed by Zeyad Milner MD on 1/3/2020 at 11:43

## 2020-01-04 NOTE — PROGRESS NOTES
Department of Psychiatry  Attending Progress Note      SUBJECTIVE:    34years old female with previous psychiatric history of depression, who has been admitted to psychiatric unit secondary to worsening of depression, drug intoxication and suicidal ideations. Patient has been seen in treatment team room. She reported that her condition significantly improved and her mood is \"better\" today. She endorses good appetite and good quality of sleep, stated that she did not experience any difficulties to fall asleep and stay asleep during some last night. Patient is compliant with currently prescribed medications and denies any side effects. She endorses beneficial effect of prescribed medications for her mood and insomnia. Patients continue to report mild feeling of depression and anxiety, and rated both of them, as 4-5 out of 10, with 10 being the worst.    Patient attends group activities in the unit but not actively participates in them. She is social with medical staff and other patients in the unit. She performs her ADLs. Currently patient denies any active suicidal or homicidal ideations, denies any plans. Also, she denies any auditory and visual hallucinations. OBJECTIVE    Physical  VITALS:  /62   Pulse 79   Temp 97.9 °F (36.6 °C) (Temporal)   Resp 20   Ht 5' 6\" (1.676 m)   Wt 93 lb 12.8 oz (42.5 kg)   LMP 12/15/2019   SpO2 95%   BMI 15.14 kg/m²   TEMPERATURE:  Current - Temp: 97.9 °F (36.6 °C); Max - Temp  Av.1 °F (36.7 °C)  Min: 97.9 °F (36.6 °C)  Max: 98.3 °F (36.8 °C)  RESPIRATIONS RANGE: Resp  Av  Min: 18  Max: 20  PULSE RANGE: Pulse  Av.3  Min: 78  Max: 80  BLOOD PRESSURE RANGE:  Systolic (00IQI), UJJ:943 , Min:110 , RWO:892   ; Diastolic (60OYD), ZYP:32, Min:62, Max:65    Review of Systems: 14 point review of systems is negative    Psychological ROS: Positive for depression and anxiety.     Mental Status Examination:   Appearance: Appropriately groomed and wearing hospital attire. Made limited eye contact. Behavior: Calm, cooperative, socially appropriate. No psychomotor retardation or agitation appreciated. Gait unremarkable. Speech: Normal in tone, volume, and quality. No slurring, dysarthria or   pressured speech noted. Mood: \"Better\"   Affect: Mood congruent. Range is full. Thought Process: Mostly linear and goal oriented, sometimes circumstantial.  Thought Content: Patient does not have any current active suicidal and   homicidal ideations. No overt delusions or paranoia appreciated. Perceptions: Seems patient does not have any auditory or visual hallucinations at present time. Patient did not appear to be responding to internal stimuli. No overt psychosis. Orientation: to person, place, and situation. Alert. Language: Intact. Fund of information: Intact. Memory: recent and remote appear intact. Impulsivity: Improved. Neurovegitative: With appetite, good sleep. Insight: Limited. Judgment: Limited.     Data  No new lab test results to review    Medications  Current Facility-Administered Medications: vitamin D (ERGOCALCIFEROL) capsule 50,000 Units, 50,000 Units, Oral, Weekly  ondansetron (ZOFRAN) tablet 4 mg, 4 mg, Oral, Q6H PRN  sulfamethoxazole-trimethoprim (BACTRIM DS;SEPTRA DS) 800-160 MG per tablet 1 tablet, 1 tablet, Oral, 2 times per day  nicotine (NICODERM CQ) 14 MG/24HR 1 patch, 1 patch, Transdermal, Daily  divalproex (DEPAKOTE ER) extended release tablet 750 mg, 750 mg, Oral, Daily  citalopram (CELEXA) tablet 20 mg, 20 mg, Oral, Daily  traZODone (DESYREL) tablet 50 mg, 50 mg, Oral, Nightly PRN  traZODone (DESYREL) tablet 50 mg, 50 mg, Oral, Once  acetaminophen (TYLENOL) tablet 650 mg, 650 mg, Oral, Q4H PRN  magnesium hydroxide (MILK OF MAGNESIA) 400 MG/5ML suspension 30 mL, 30 mL, Oral, Daily PRN    ASSESSMENT AND PLAN    DSM 5 Diagnoses:    Bipolar affective disorder, currently depressed, moderate   Methamphetamine use

## 2020-01-04 NOTE — PROGRESS NOTES
[facility] Adult Unit Daily Assessment  Nursing Progress Note     Room: [facility]/601-01   Name: Jaye Matamoros   Age: 29 y.o.   Gender: female   Dx: Bipolar affective disorder, currently depressed, moderate ([address]  Precautions: suicide risk  Inpatient Status: voluntary         SLEEP:     Sleep Quality Good  Sleep Medications: No   PRN Sleep Meds: Yes         MEDICAL:     Other PRN Meds: No   Med Compliant: Yes  Accu-Chek: No  Oxygen/CPAP/BiPAP: No  CIWA/CINA: No   PAIN Assessment: none  Side Effects from medication: None reported        PSYCH:     Depression: 0  Anxiety: 5  SI denies suicidal ideation   HI Negative for homicidal ideation      AVH:Absent        GENERAL:     Appetite: poor  Social: No   Speech: normal   Appearance: appropriately dressed and healthy looking     GROUP:     Group Participation: Yes  Participation Quality: minimal    Notes: Patient calm and cooperative with staff. Will continue to monitor.     Electronically signed by Evelia Severance, RN on 1/3/2020 at 10:03 PM

## 2020-01-04 NOTE — PROGRESS NOTES
Group Therapy Note    Date: 1/4/2020  Start Time: 0900  End Time:  0930  Number of Participants: 3    Type of Group: Community [address]  Module Name:    Session Number:      Patient's Goal:  Fill out menu, self inventory , socialization and support. Notes:  Patient refused to participate.      Status After Intervention:  Unchanged    Participation Level: None    Participation Quality: Resistant      Speech:  normal      Thought Process/Content: Linear      Affective Functioning: Congruent      Mood: tired      Level of consciousness:  Drowsy, Preoccupied and Inattentive      Response to Learning: Resistant      Endings: None Reported    Modes of Intervention: Support and Socialization      Discipline Responsible: Registered Nurse      Signature:  Ivy Fan RN

## 2020-01-04 NOTE — PROGRESS NOTES
[facility] but did fill out wrap up sheet.

## 2020-01-05 PROCEDURE — 6370000000 HC RX 637 (ALT 250 FOR IP): Performed by: NURSE PRACTITIONER

## 2020-01-05 PROCEDURE — 1240000000 HC EMOTIONAL WELLNESS R&B

## 2020-01-05 PROCEDURE — 6370000000 HC RX 637 (ALT 250 FOR IP): Performed by: FAMILY MEDICINE

## 2020-01-05 RX ADMIN — DIVALPROEX SODIUM 750 MG: 500 TABLET, FILM COATED, EXTENDED RELEASE ORAL at 10:54

## 2020-01-05 RX ADMIN — CITALOPRAM HYDROBROMIDE 20 MG: 20 TABLET ORAL at 10:54

## 2020-01-05 RX ADMIN — SULFAMETHOXAZOLE AND TRIMETHOPRIM 1 TABLET: 800; 160 TABLET ORAL at 20:28

## 2020-01-05 RX ADMIN — SULFAMETHOXAZOLE AND TRIMETHOPRIM 1 TABLET: 800; 160 TABLET ORAL at 10:54

## 2020-01-05 RX ADMIN — TRAZODONE HYDROCHLORIDE 50 MG: 50 TABLET ORAL at 20:28

## 2020-01-05 ASSESSMENT — PAIN DESCRIPTION - DESCRIPTORS: DESCRIPTORS: ACHING

## 2020-01-05 ASSESSMENT — PAIN - FUNCTIONAL ASSESSMENT: PAIN_FUNCTIONAL_ASSESSMENT: ACTIVITIES ARE NOT PREVENTED

## 2020-01-05 ASSESSMENT — PAIN DESCRIPTION - ORIENTATION: ORIENTATION: MID

## 2020-01-05 ASSESSMENT — PAIN DESCRIPTION - LOCATION: LOCATION: HEAD

## 2020-01-05 ASSESSMENT — PAIN DESCRIPTION - PAIN TYPE: TYPE: CHRONIC PAIN

## 2020-01-05 ASSESSMENT — PAIN DESCRIPTION - ONSET: ONSET: GRADUAL

## 2020-01-05 ASSESSMENT — PAIN SCALES - GENERAL: PAINLEVEL_OUTOF10: 5

## 2020-01-05 ASSESSMENT — PAIN DESCRIPTION - FREQUENCY: FREQUENCY: CONTINUOUS

## 2020-01-05 ASSESSMENT — PAIN DESCRIPTION - PROGRESSION: CLINICAL_PROGRESSION: NOT CHANGED

## 2020-01-05 NOTE — PROGRESS NOTES
Group Therapy Note    Date: 1/5/2020  Start Time: 0830  End Time:   0845  Number of Participants: 5    Type of Group: [employer]  Module Name:    Session Number:      Patient's Goal:  Socialization and education    Notes:  Patient did not attend community group.      Status After Intervention:  Unchanged    Participation Level: None    Participation Quality: Resistant      Speech:  pressured      Thought Process/Content: Linear      Affective Functioning: Congruent      Mood: anxious      Level of consciousness:  Drowsy and Inattentive      Response to Learning: Resistant      Endings: None Reported    Modes of Intervention: Support and Socialization      Discipline Responsible: Registered Nurse      Signature:  Ken Walker RN

## 2020-01-05 NOTE — GROUP NOTE
Group Therapy Note    Date: 1/5/2020    Group Start Time: 9896  Group End Time: 7747  Group Topic: Recovery    [facility]    Danay Ballard             Patient's Goal: Managing Depression    Notes: Pt did not attend group therapy although she was encouraged by staff to participate.       Discipline Responsible: Psychoeducational Specialist      Signature:  Danay Ballard

## 2020-01-05 NOTE — PROGRESS NOTES
Group Note    Number of Participants in Group: 6  Number of Patients on Unit:7  Patient attended group:Yes  Reason for Absence:  Intervention for patient absence:        Type of Group:   Wrap-Up/Relaxation    Patient's Goal:    Participation Level:     Active Listener, Interactive, Monopolizing, Minimal and None           Patient Response to Learning: Yes    Patient's Behavior: Cooperative         Notes/Comments:    Pt filled out wrap up sheet         Electronically signed by Evan Araiza RN on 1/4/20 at 9:44 PM

## 2020-01-06 VITALS
WEIGHT: 93.8 LBS | TEMPERATURE: 96.5 F | HEIGHT: 66 IN | HEART RATE: 77 BPM | RESPIRATION RATE: 16 BRPM | DIASTOLIC BLOOD PRESSURE: 61 MMHG | BODY MASS INDEX: 15.08 KG/M2 | OXYGEN SATURATION: 99 % | SYSTOLIC BLOOD PRESSURE: 105 MMHG

## 2020-01-06 PROCEDURE — 6370000000 HC RX 637 (ALT 250 FOR IP): Performed by: NURSE PRACTITIONER

## 2020-01-06 PROCEDURE — 6370000000 HC RX 637 (ALT 250 FOR IP): Performed by: FAMILY MEDICINE

## 2020-01-06 PROCEDURE — 99238 HOSP IP/OBS DSCHRG MGMT 30/<: CPT | Performed by: NURSE PRACTITIONER

## 2020-01-06 PROCEDURE — 6370000000 HC RX 637 (ALT 250 FOR IP): Performed by: PSYCHIATRY & NEUROLOGY

## 2020-01-06 PROCEDURE — 99283 EMERGENCY DEPT VISIT LOW MDM: CPT

## 2020-01-06 PROCEDURE — 5130000000 HC BRIDGE APPOINTMENT

## 2020-01-06 RX ORDER — CITALOPRAM 20 MG/1
20 TABLET ORAL DAILY
Qty: 30 TABLET | Refills: 3 | Status: SHIPPED | OUTPATIENT
Start: 2020-01-07 | End: 2020-02-19

## 2020-01-06 RX ORDER — TRAZODONE HYDROCHLORIDE 50 MG/1
50 TABLET ORAL NIGHTLY PRN
Qty: 30 TABLET | Refills: 0 | Status: SHIPPED | OUTPATIENT
Start: 2020-01-06 | End: 2020-02-19

## 2020-01-06 RX ORDER — SULFAMETHOXAZOLE AND TRIMETHOPRIM 800; 160 MG/1; MG/1
1 TABLET ORAL EVERY 12 HOURS SCHEDULED
Qty: 8 TABLET | Refills: 0 | Status: SHIPPED | OUTPATIENT
Start: 2020-01-06 | End: 2020-01-10

## 2020-01-06 RX ORDER — ERGOCALCIFEROL 1.25 MG/1
50000 CAPSULE ORAL WEEKLY
Qty: 11 CAPSULE | Refills: 0 | Status: SHIPPED | OUTPATIENT
Start: 2020-01-06 | End: 2020-02-19

## 2020-01-06 RX ORDER — DIVALPROEX SODIUM 250 MG/1
750 TABLET, EXTENDED RELEASE ORAL DAILY
Qty: 90 TABLET | Refills: 0 | Status: SHIPPED | OUTPATIENT
Start: 2020-01-07 | End: 2020-02-19

## 2020-01-06 RX ADMIN — DIVALPROEX SODIUM 750 MG: 500 TABLET, FILM COATED, EXTENDED RELEASE ORAL at 07:43

## 2020-01-06 RX ADMIN — SULFAMETHOXAZOLE AND TRIMETHOPRIM 1 TABLET: 800; 160 TABLET ORAL at 07:43

## 2020-01-06 RX ADMIN — ACETAMINOPHEN 650 MG: 325 TABLET ORAL at 09:28

## 2020-01-06 RX ADMIN — CITALOPRAM HYDROBROMIDE 20 MG: 20 TABLET ORAL at 07:43

## 2020-01-06 ASSESSMENT — PAIN DESCRIPTION - LOCATION: LOCATION: HEAD

## 2020-01-06 ASSESSMENT — PAIN SCALES - GENERAL
PAINLEVEL_OUTOF10: 8
PAINLEVEL_OUTOF10: 8

## 2020-01-06 ASSESSMENT — PAIN - FUNCTIONAL ASSESSMENT: PAIN_FUNCTIONAL_ASSESSMENT: ACTIVITIES ARE NOT PREVENTED

## 2020-01-06 ASSESSMENT — PAIN DESCRIPTION - ORIENTATION: ORIENTATION: MID

## 2020-01-06 ASSESSMENT — PAIN DESCRIPTION - FREQUENCY: FREQUENCY: CONTINUOUS

## 2020-01-06 ASSESSMENT — PAIN DESCRIPTION - ONSET: ONSET: GRADUAL

## 2020-01-06 ASSESSMENT — PAIN DESCRIPTION - DESCRIPTORS: DESCRIPTORS: ACHING

## 2020-01-06 ASSESSMENT — PAIN DESCRIPTION - PAIN TYPE: TYPE: CHRONIC PAIN

## 2020-01-06 ASSESSMENT — PAIN DESCRIPTION - PROGRESSION: CLINICAL_PROGRESSION: NOT CHANGED

## 2020-01-06 NOTE — PROGRESS NOTES
Group Therapy Note    Start Time:815  End Time:  574  Number of Participants: 8    Type of Group: Community Meeting       Patient's Goal:  \"getting to rehab\"      Notes:      Participation Level:  Active Listener       Participation Quality: Appropriate      Thought Process/Content: Logical      Affective Functioning: Congruent      Mood: calm      Level of consciousness:  Alert      Modes of Intervention: Support      Discipline Responsible: Behavioral Health Tech II      Signature:  Jayden Ocampo

## 2020-01-06 NOTE — PLAN OF CARE
Group Therapy Note     Date: 1/6/2020  Start Time: 1430  End Time:  8912  Number of Participants: 6     Type of Group: Cognitive Skills     Wellness Binder Information  Module Name:  emotional wellness  Session Number:  5     Patient's Goal:  obstacles to emotional wellness     Notes:  pt was verbally prompted to attend group. Pt refused. Information about obstacles to wellness was provided. Status After Intervention:       Participation Level:      Participation Quality:         Speech:           Thought Process/Content:         Affective Functioning:         Mood:         Level of consciousness:          Response to Learning:         Endings:      Modes of Intervention:         Discipline Responsible: Psychoeducational Specialist        Signature:  Kirill Chopra

## 2020-01-06 NOTE — PROGRESS NOTES
[facility] Adult Unit Daily Assessment  Nursing Progress Note    Room: [facility]/601-01   Name: Lucie Weiss   Age: 34 y.o. Gender: female   Dx: Bipolar affective disorder, currently depressed, moderate (HCC)  Precautions: Suicide risk  Inpatient Status: voluntary       SLEEP:    Sleep Quality Good  Sleep Medications: Yes   PRN Sleep Meds: No       MEDICAL:    Other PRN Meds: No   Med Compliant: Yes  Accu-Chek: No  Oxygen/CPAP/BiPAP: No  CIWA/CINA: No   PAIN Assessment: none        PSYCH:    Depression: 6   Anxiety: 10   SI denies suicidal ideation   HI Negative for homicidal ideation      AVH:Absent      GENERAL:    Appetite: good    Social: Yes   Speech: normal   Appearance: appropriately dressed and healthy looking    GROUP:    Group Participation: Yes  Participation Quality: None    Notes: Patient stated that she wanted to go to [facility]. Patient reported that she needs to get clean from the drugs to win  back custody of her daughter. Patient demonstrated anger toward a staff member. Patient was encouraged to calm down. Patient was cooperative with the nurse and her peers. Will continue to monitor.          Electronically signed by Antony Tello RN on 1/5/20 at 10:14 PM

## 2020-01-06 NOTE — PROGRESS NOTES
Progress Note  Minor Person  1/5/2020 11:28 PM  Subjective:   Admit Date:   12/30/2019      CC/ADMIT DX:       Interval History:   Reviewed overnight events and nursing notes. She denies any medical complaints. I have reviewed all labs/diagnostics from the last 24hrs. ROS:   I have done a 10 point ROS and all are negative, except what is mentioned in the HPI. Dietary Nutrition Supplements: Standard High Calorie Oral Supplement  DIET GENERAL;    Medications:      vitamin D  50,000 Units Oral Weekly    sulfamethoxazole-trimethoprim  1 tablet Oral 2 times per day    nicotine  1 patch Transdermal Daily    divalproex  750 mg Oral Daily    citalopram  20 mg Oral Daily    traZODone  50 mg Oral Once           Objective:   Vitals: /79   Pulse 81   Temp 97.3 °F (36.3 °C) (Temporal)   Resp 18   Ht 5' 6\" (1.676 m)   Wt 93 lb 12.8 oz (42.5 kg)   LMP 12/15/2019   SpO2 96%   BMI 15.14 kg/m²  No intake or output data in the 24 hours ending 01/05/20 2328  General appearance: alert and cooperative with exam  Lungs: clear to auscultation bilaterally  Heart: RRR  Abdomen: soft, non-tender; bowel sounds normal; no masses,  no organomegaly  Extremities: extremities normal, atraumatic, no cyanosis or edema  Neurologic:  No obvious focal neurologic deficits. Assessment and Plan:   Principal Problem:    Bipolar affective disorder, currently depressed, moderate (HCC)  Active Problems:    Methamphetamine use disorder, severe ([address]    Synthetic cannabinoid dependence ([address]    Intellectual disability    Depression    Severe malnutrition (HCC)  Resolved Problems:    Persistent mood disorder (HCC)    Vit D Def   Vaginitis    Plan:  1. Continue present medication(s)   2. Monitor for GC, Chlamydia culture results  3. Follow with Psych      Discharge planning:   her home     Reviewed treatment plans with the patient and/or family.              Electronically signed by Carissa Jimenez MD on 1/5/2020 at 11:28

## 2020-01-06 NOTE — DISCHARGE SUMMARY
Plus. Social work faxed over her psych eval and medication list as well as her insurance information to [facility]. Carlo Padgett also spoke to [facility] over the phone on 3 different occasions. This provider spoke to Liam Moreau the manager who reported Carlo aPdgett was accepted. This provider told her that it would take about 2 hours for her to get there and we were providing transportation via [address]. Monica stated that was fine. We also sent Carlo Padgett with prescriptions for 30 days to get filled as well. Patient attended and participated in some groups.  Patient was calm and cooperative with staff and peers. Patient was compliant with her medications. Patient was sleeping through the night. This patient is not suicidal, homicidal or psychotic at discharge. She does not present a danger to self or others. On the day of discharge and transfer of care, patient indicated readiness for discharge. She was not acutely manic nor agitated with no reported symptoms of psychosis. She indicated no thoughts of self-harm or harm to others. Given resolution is presenting symptoms and patient readiness for discharge and that patient agreed to follow-up with outpatient services with Heena Mcneil  Josep. and  the patient was discharged with a 30 day supply of medication. She denied access to firearms or weapons. She was advised to abstain from all drugs and alcohol and to remain adherent to medication as prescribed. She was provided transportation via [address] to [facility] in [place], [place].            Number of antipsychotic medication prescribed at discharge: 0      Referral to addiction treatment: pt accepted into [facility] CD treatment    Prescription for Alcohol or Drug Disorder Medication: no fda approved medications for spice and methamphetamine abuse    Prescription for Tobacco Cessation medication: pt declined    If no prescriptions for Tobacco Cessation must document why: pt declined    Consults: internal
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
## 2020-01-06 NOTE — PROGRESS NOTES
Nutrition Assessment    Type and Reason for Visit: Reassess    Nutrition Assessment: Pt is improving from a nutritional standpoint AEB ongoing good PO intake. Continue current POC at this time. Malnutrition Assessment:  · Malnutrition Status: Meets the criteria for severe malnutrition    Nutrition Risk Level: Moderate    Nutrition Diagnosis:   · Problem: Severe malnutrition  · Etiology: related to Catabolic illness, Insufficient energy/nutrient consumption     Signs and symptoms:  as evidenced by BMI, Weight loss, Severe loss of subcutaneous fat, Severe muscle loss    Nutrition Interventions:   Continue current diet, Continue current ONS  Continued Inpatient Monitoring    Nutrition Evaluation:   · Evaluation: Progressing toward goals   · Goals: po intake 75% or greater.   Weight slowly increase 1-3# per week    · Monitoring: Meal Intake, Diet Tolerance, Skin Integrity, Weight, Pertinent Labs      Electronically signed by Shelbi Loja, MS, RD, LD on 1/6/20 at 3:10 PM    Contact Number: [phone]

## 2020-01-06 NOTE — PROGRESS NOTES
JANET faxed patients intake paperwork to [facility] in [facility] for evaluation for admission.

## 2020-01-07 ENCOUNTER — HOSPITAL ENCOUNTER (EMERGENCY)
Facility: HOSPITAL | Age: 30
Discharge: HOME | End: 2020-01-07
Attending: EMERGENCY MEDICINE | Admitting: EMERGENCY MEDICINE

## 2020-01-07 VITALS
BODY MASS INDEX: 18.81 KG/M2 | HEIGHT: 61 IN | RESPIRATION RATE: 18 BRPM | HEART RATE: 64 BPM | OXYGEN SATURATION: 98 % | DIASTOLIC BLOOD PRESSURE: 66 MMHG | WEIGHT: 99.6 LBS | SYSTOLIC BLOOD PRESSURE: 117 MMHG | TEMPERATURE: 98 F

## 2020-01-07 DIAGNOSIS — F15.10 METHAMPHETAMINE ABUSE (HCC): Primary | ICD-10-CM

## 2020-01-07 NOTE — PROGRESS NOTES
JANET placed a call to Liam Moreau at [facility]([phone]) plus to confirm that she had received that application for patient and was able to find out that she had gotten the application. After speaking with Monica it was confirmed that she needed to speak with the patient as part of the admission process at [facility]. JANET gave the number to the patient and the patient placed a call to do a phone screen with Monica around 10:30a, pm 1/6/20. After the phone call the SW placed a call back and spoke with Liam Moreau. Liam Salvador Rafiqanu said that she was going to review the paperwork(application, H&P, medication list and progress note) and and let the SW know before lunch time on a decision. JANET called back around 12:15 to speak with Monica and was asked to call back around 2:00pm.    JANET informed the medical provider Kaley Perez of this and Kaley placed a call to Monica at [facility] as well. At 2:00 Kaley placed a call with the patient on the phone and was able to confirm that the patient had been accepted. After the SW was notified and at that time the SW placed a call to [facility] to arrange transportation to [facility]. Patient was picked up by Shena Earl on 1/6 around 4:30pm.    This morning (1/7/20)JANET received a call for the former patient that reported that she was not accepted by [facility] and that she was treated poorly by the staff and was taken to the Critical access hospital, [facility] personal care home in [facility]. JANET notified Kaley who then place a call to speak with Liam Moreau. JANET received a call from [address]) from the [facility] in [place], [place] that reported that the patient was taken to the Hospital in [facility] and that after not being admitted patient was dropped off at the [facility] arm.

## 2020-01-07 NOTE — ED PROVIDER NOTES
"Subjective   29-year-old female presents to the emergency department with concern for needing to find a rehabilitation facility for methamphetamine abuse.  She reports that she just recently spent a week in the psychiatric facility at [facility] and that she was \"accepted\" to a rehab facility for a reported 6-month stay.  She reports that they arrange transportation for her here locally to come to the facility and when she got there they decided that she was not accepted because of a \"mental disability\".  They then sent her by taxi to the local [facility].  [facility] and instructed her that she should come here to try to get placement into a facility.  She denies any suicidal or homicidal ideation.  She is also concerned because she is now far away from home and does not have any way to get back home.    Family history, surgical history, social history, current medications and allergies are reviewed with the patient and triage documentation and vitals are reviewed.        History provided by:  Patient and medical records   used: No        Review of Systems   Constitutional: Negative for chills and fever.   Psychiatric/Behavioral: Negative for dysphoric mood, self-injury and suicidal ideas. The patient is not nervous/anxious.    All other systems reviewed and are negative.      History reviewed. No pertinent past medical history.    Allergies   Allergen Reactions   • Amoxicillin Rash   • Penicillins Rash       History reviewed. No pertinent surgical history.    History reviewed. No pertinent family history.    Social History     Socioeconomic History   • Marital status: Single     Spouse name: Not on file   • Number of children: Not on file   • Years of education: Not on file   • Highest education level: Not on file           Objective   Physical Exam   Constitutional: She is oriented to person, place, and time. She appears well-developed and well-nourished. No distress. "   Eyes: Pupils are equal, round, and reactive to light.   Cardiovascular: Normal rate and regular rhythm.   Pulmonary/Chest: Effort normal and breath sounds normal.   Neurological: She is alert and oriented to person, place, and time.   Skin: Skin is warm and dry. Capillary refill takes less than 2 seconds. She is not diaphoretic.   Psychiatric: Her mood appears anxious.   Nursing note and vitals reviewed.      Procedures  none         ED Course    Labs Reviewed - No data to display  No results found.      MDM  Number of Diagnoses or Management Options  Methamphetamine abuse (CMS/[facility]):   Patient Progress  Patient progress: stable    Patient advised that this is not a detox or rehab facility we do not have the ability to place her especially at this time of day.  She is given a list of resources to contact to possibly get placement.  She is transported back to the [facility] shelter when they come to get her.    Final diagnoses:   Methamphetamine abuse (CMS/[facility])            Husam Rivero,   01/08/20 0529

## 2020-01-07 NOTE — ED NOTES
Pt was released from [facility] in [place] today where she spent a week in their behavioral health unit for SI and mood disorder. Pt was supposed to go to [facility] facility for methamphetamine abuse which [facility] contacted and made arrangements also providing transportation. Pt got to [facility] facility and then refused to take the pt due to her intellectual functioning. Pt ended up being brought to our ER by [facility] for detox and rehabilitation services. Pt is not suicidal or homicidal at this time.        Tracey Astorga, RN  01/06/20 8010

## 2020-01-07 NOTE — DISCHARGE INSTRUCTIONS
Please return with new or worsening symptoms.  Follow-up with the rehab facility list provided for further treatment.

## 2020-02-08 ENCOUNTER — HOSPITAL ENCOUNTER (EMERGENCY)
Facility: HOSPITAL | Age: 30
Discharge: HOME | End: 2020-02-08
Attending: EMERGENCY MEDICINE | Admitting: EMERGENCY MEDICINE

## 2020-02-08 VITALS
HEART RATE: 82 BPM | TEMPERATURE: 98.8 F | RESPIRATION RATE: 18 BRPM | BODY MASS INDEX: 16.99 KG/M2 | HEIGHT: 61 IN | SYSTOLIC BLOOD PRESSURE: 140 MMHG | OXYGEN SATURATION: 99 % | DIASTOLIC BLOOD PRESSURE: 82 MMHG | WEIGHT: 90 LBS

## 2020-02-08 DIAGNOSIS — R45.851 SUICIDAL IDEATION: ICD-10-CM

## 2020-02-08 DIAGNOSIS — F41.9 ANXIETY: Primary | ICD-10-CM

## 2020-02-08 LAB
6-ACETYL MORPHINE: NEGATIVE
ALBUMIN SERPL-MCNC: 4.01 G/DL (ref 3.5–5.2)
ALBUMIN/GLOB SERPL: 1.3 G/DL
ALP SERPL-CCNC: 102 U/L (ref 39–117)
ALT SERPL W P-5'-P-CCNC: 65 U/L (ref 1–33)
AMPHET+METHAMPHET UR QL: NEGATIVE
ANION GAP SERPL CALCULATED.3IONS-SCNC: 11.2 MMOL/L (ref 5–15)
AST SERPL-CCNC: 40 U/L (ref 1–32)
B-HCG UR QL: NEGATIVE
BACTERIA UR QL AUTO: ABNORMAL /HPF
BARBITURATES UR QL SCN: NEGATIVE
BASOPHILS # BLD AUTO: 0.02 10*3/MM3 (ref 0–0.2)
BASOPHILS NFR BLD AUTO: 0.2 % (ref 0–1.5)
BENZODIAZ UR QL SCN: NEGATIVE
BILIRUB SERPL-MCNC: <0.2 MG/DL (ref 0.2–1.2)
BILIRUB UR QL STRIP: NEGATIVE
BUN BLD-MCNC: 13 MG/DL (ref 6–20)
BUN/CREAT SERPL: 20.6 (ref 7–25)
BUPRENORPHINE SERPL-MCNC: NEGATIVE NG/ML
CALCIUM SPEC-SCNC: 9.2 MG/DL (ref 8.6–10.5)
CANNABINOIDS SERPL QL: NEGATIVE
CHLORIDE SERPL-SCNC: 102 MMOL/L (ref 98–107)
CLARITY UR: CLEAR
CO2 SERPL-SCNC: 25.8 MMOL/L (ref 22–29)
COCAINE UR QL: NEGATIVE
COLOR UR: YELLOW
CREAT BLD-MCNC: 0.63 MG/DL (ref 0.57–1)
DEPRECATED RDW RBC AUTO: 55.9 FL (ref 37–54)
EOSINOPHIL # BLD AUTO: 0.25 10*3/MM3 (ref 0–0.4)
EOSINOPHIL NFR BLD AUTO: 2.8 % (ref 0.3–6.2)
ERYTHROCYTE [DISTWIDTH] IN BLOOD BY AUTOMATED COUNT: 14.4 % (ref 12.3–15.4)
ETHANOL BLD-MCNC: <10 MG/DL (ref 0–10)
ETHANOL UR QL: <0.01 %
GFR SERPL CREATININE-BSD FRML MDRD: 113 ML/MIN/1.73
GLOBULIN UR ELPH-MCNC: 3.2 GM/DL
GLUCOSE BLD-MCNC: 105 MG/DL (ref 65–99)
GLUCOSE UR STRIP-MCNC: NEGATIVE MG/DL
HCT VFR BLD AUTO: 37.2 % (ref 34–46.6)
HGB BLD-MCNC: 11.6 G/DL (ref 12–15.9)
HGB UR QL STRIP.AUTO: NEGATIVE
HYALINE CASTS UR QL AUTO: ABNORMAL /LPF
IMM GRANULOCYTES # BLD AUTO: 0.05 10*3/MM3 (ref 0–0.05)
IMM GRANULOCYTES NFR BLD AUTO: 0.6 % (ref 0–0.5)
KETONES UR QL STRIP: NEGATIVE
LEUKOCYTE ESTERASE UR QL STRIP.AUTO: ABNORMAL
LYMPHOCYTES # BLD AUTO: 2.2 10*3/MM3 (ref 0.7–3.1)
LYMPHOCYTES NFR BLD AUTO: 24.5 % (ref 19.6–45.3)
MAGNESIUM SERPL-MCNC: 2 MG/DL (ref 1.6–2.6)
MCH RBC QN AUTO: 32.3 PG (ref 26.6–33)
MCHC RBC AUTO-ENTMCNC: 31.2 G/DL (ref 31.5–35.7)
MCV RBC AUTO: 103.6 FL (ref 79–97)
METHADONE UR QL SCN: NEGATIVE
MONOCYTES # BLD AUTO: 0.71 10*3/MM3 (ref 0.1–0.9)
MONOCYTES NFR BLD AUTO: 7.9 % (ref 5–12)
NEUTROPHILS # BLD AUTO: 5.75 10*3/MM3 (ref 1.7–7)
NEUTROPHILS NFR BLD AUTO: 64 % (ref 42.7–76)
NITRITE UR QL STRIP: NEGATIVE
NRBC BLD AUTO-RTO: 0 /100 WBC (ref 0–0.2)
OPIATES UR QL: NEGATIVE
OXYCODONE UR QL SCN: NEGATIVE
PCP UR QL SCN: NEGATIVE
PH UR STRIP.AUTO: 7 [PH] (ref 5–8)
PLATELET # BLD AUTO: 301 10*3/MM3 (ref 140–450)
PMV BLD AUTO: 9.8 FL (ref 6–12)
POTASSIUM BLD-SCNC: 4 MMOL/L (ref 3.5–5.2)
PROT SERPL-MCNC: 7.2 G/DL (ref 6–8.5)
PROT UR QL STRIP: NEGATIVE
RBC # BLD AUTO: 3.59 10*6/MM3 (ref 3.77–5.28)
RBC # UR: ABNORMAL /HPF
REF LAB TEST METHOD: ABNORMAL
SODIUM BLD-SCNC: 139 MMOL/L (ref 136–145)
SP GR UR STRIP: 1.01 (ref 1–1.03)
SQUAMOUS #/AREA URNS HPF: ABNORMAL /HPF
UROBILINOGEN UR QL STRIP: ABNORMAL
WBC NRBC COR # BLD: 8.98 10*3/MM3 (ref 3.4–10.8)
WBC UR QL AUTO: ABNORMAL /HPF

## 2020-02-08 PROCEDURE — 80307 DRUG TEST PRSMV CHEM ANLYZR: CPT | Performed by: EMERGENCY MEDICINE

## 2020-02-08 PROCEDURE — 36415 COLL VENOUS BLD VENIPUNCTURE: CPT

## 2020-02-08 PROCEDURE — 81001 URINALYSIS AUTO W/SCOPE: CPT | Performed by: EMERGENCY MEDICINE

## 2020-02-08 PROCEDURE — 81025 URINE PREGNANCY TEST: CPT | Performed by: EMERGENCY MEDICINE

## 2020-02-08 PROCEDURE — 83735 ASSAY OF MAGNESIUM: CPT | Performed by: EMERGENCY MEDICINE

## 2020-02-08 PROCEDURE — 80053 COMPREHEN METABOLIC PANEL: CPT | Performed by: EMERGENCY MEDICINE

## 2020-02-08 PROCEDURE — 85025 COMPLETE CBC W/AUTO DIFF WBC: CPT | Performed by: EMERGENCY MEDICINE

## 2020-02-08 PROCEDURE — 99284 EMERGENCY DEPT VISIT MOD MDM: CPT

## 2020-02-08 RX ORDER — HYDROXYZINE HYDROCHLORIDE 25 MG/1
50 TABLET, FILM COATED ORAL ONCE
Status: COMPLETED | OUTPATIENT
Start: 2020-02-08 | End: 2020-02-08

## 2020-02-08 RX ADMIN — HYDROXYZINE HYDROCHLORIDE 50 MG: 25 TABLET, FILM COATED ORAL at 19:49

## 2020-02-09 NOTE — ED PROVIDER NOTES
Subjective   This is a 29-year-old female who comes in with chief complaint of ideation and anxiety.  Patient did not state that she has specific plan.  Patient has history of alcohol abuse, anxiety, autism spectrum disorder, substance abuse.      History provided by:  Patient   used: No    Mental Health Problem   Presenting symptoms: agitation, suicidal thoughts and suicidal threats    Presenting symptoms: no aggressive behavior and no bizarre behavior    Presenting symptoms comment:  Anxiety    Patient accompanied by:  Caregiver  Degree of incapacity (severity):  Moderate  Onset quality:  Sudden  Duration:  1 day  Timing:  Intermittent  Progression:  Worsening  Chronicity:  New  Context: not alcohol use, not drug abuse, not noncompliant, not recent medication change and not stressful life event    Treatment compliance:  Untreated  Time since last psychoactive medication taken:  1 day  Relieved by:  Nothing  Worsened by:  Nothing  Ineffective treatments:  None tried  Associated symptoms: no abdominal pain, no anhedonia, no anxiety, no appetite change, no chest pain, no decreased need for sleep, no euphoric mood, no fatigue, no feelings of worthlessness, no headaches, no hyperventilation, no insomnia, no irritability, no poor judgment, no school problems and no weight change    Risk factors: no family hx of mental illness, no hx of suicide attempts and no neurological disease        Review of Systems   Constitutional: Negative.  Negative for appetite change, fatigue and irritability.   HENT: Negative.    Eyes: Negative.  Negative for pain, discharge, redness and itching.   Respiratory: Negative.  Negative for cough, chest tightness and shortness of breath.    Cardiovascular: Negative.  Negative for chest pain and leg swelling.   Gastrointestinal: Negative.  Negative for abdominal pain.   Endocrine: Negative.  Negative for cold intolerance, heat intolerance, polydipsia and polyphagia.    Genitourinary: Negative.  Negative for difficulty urinating, dyspareunia, dysuria and flank pain.   Musculoskeletal: Negative.  Negative for arthralgias, back pain and joint swelling.   Neurological: Negative for headaches.   Psychiatric/Behavioral: Positive for agitation and suicidal ideas. Negative for behavioral problems and decreased concentration. The patient is not nervous/anxious and does not have insomnia.    All other systems reviewed and are negative.      Past Medical History:   Diagnosis Date   • Alcohol abuse    • Anxiety    • Autism spectrum disorder    • Cancer (CMS/[facility])    • Hearing loss    • Learning disability    • Substance abuse (CMS/[facility])        Allergies   Allergen Reactions   • Amoxicillin Rash   • Penicillins Rash       Past Surgical History:   Procedure Laterality Date   • TONSILLECTOMY         No family history on file.    Social History     Socioeconomic History   • Marital status: Single     Spouse name: Not on file   • Number of children: Not on file   • Years of education: Not on file   • Highest education level: Not on file   Tobacco Use   • Smoking status: Current Every Day Smoker     Packs/day: 0.50     Years: 1.00     Pack years: 0.50     Types: Cigarettes   • Smokeless tobacco: Never Used   Substance and Sexual Activity   • Alcohol use: Yes     Comment: 1 pint every other day   • Drug use: Yes     Types: Amphetamines, Marijuana, Benzodiazepines   • Sexual activity: Yes     Partners: Male     Birth control/protection: Injection           Objective   Physical Exam   Constitutional: She is oriented to person, place, and time. She appears well-developed and well-nourished. No distress.   HENT:   Head: Normocephalic and atraumatic.   Right Ear: External ear normal.   Left Ear: External ear normal.   Nose: Nose normal.   Mouth/Throat: Oropharynx is clear and moist. No oropharyngeal exudate.   Eyes: Pupils are equal, round, and reactive to light. Conjunctivae and EOM are normal. Right  eye exhibits no discharge. Left eye exhibits no discharge. No scleral icterus.   Neck: Normal range of motion. Neck supple. No JVD present. No tracheal deviation present. No thyromegaly present.   Cardiovascular: Normal rate, regular rhythm, normal heart sounds and intact distal pulses. Exam reveals no gallop and no friction rub.   No murmur heard.  Pulmonary/Chest: Effort normal and breath sounds normal. No stridor. No respiratory distress. She has no wheezes. She has no rales. She exhibits no tenderness.   Abdominal: Soft. Bowel sounds are normal. She exhibits no distension and no mass. There is no tenderness. There is no rebound and no guarding. No hernia.   Musculoskeletal: Normal range of motion. She exhibits no edema, tenderness or deformity.   Lymphadenopathy:     She has no cervical adenopathy.   Neurological: She is alert and oriented to person, place, and time. She displays normal reflexes. No cranial nerve deficit or sensory deficit. She exhibits normal muscle tone. Coordination normal.   Skin: Skin is warm. Capillary refill takes less than 2 seconds. No rash noted. She is not diaphoretic. No erythema. No pallor.   Psychiatric: She has a normal mood and affect. Her behavior is normal. Judgment and thought content normal.   Nursing note and vitals reviewed.      Procedures           ED Course  ED Course as of Feb 09 0755   Sat Feb 08, 2020 1944 Medically cleared for intake.     []   2049 Dr. Anand recommends outpatient follow up    []      ED Course User Index  [] Ronnell Higgins PA-C  [] Charleen Higgins PA-C                                               [facility]    Final diagnoses:   Anxiety   Suicidal ideation            Ronnell Higgins PA-C  02/08/20 1945       Ronnell Higgins PA-C  02/09/20 0755

## 2020-02-09 NOTE — NURSING NOTE
Presented clinicals to Dr. Anand. Reviewed lab results, assessment findings, and vital signs. Patient has no history of suicidal behavior and was given Atarax for her anxiety. Patient calmer and agreeable to return to the shelter. Patient now adamantly denies SI/HI/AVH. Given outpatient packet for follow up. Her goal is to get to [facility] in [place]. Instructed that if she does become suicidal or if anxiety worsens, to return to the ED.

## 2020-02-09 NOTE — NURSING NOTE
"Intake assessment completed. Patient presented to Intake from ED. Was brought by police who picked her up \"walking to the store\". Pt reports thought of to \"maybe jump off a bridge\" if she can't get something for her nerves. Patient was residing in a homeless shelter after she left [facility] earlier in the day. Patient was at [facility] for 6 days, was in [facility] in [place] for 10 days prior. Patient reports that she is on disability and can't work because of her nerves. Reports using meth daily for 2 years ($50/day), synthetic marijuana daily for 2 years ($30/day), xanax daily for 2 years (couldn't tell me how much, and alcohol since age 20 (1 pint every other day). Patient is very hyperverbal, loud, and rambles. Reports slight hearing loss in both ears. She is very anxious and irritable. COWS 9, CIWA 8, rates anxiety 10/10, depression 8/10, reports poor sleep, denies AVH, reports feelings of hopeless, helpless and worthlessness. Will monitor in room 6.  "

## 2020-02-19 ENCOUNTER — HOSPITAL ENCOUNTER (INPATIENT)
Age: 30
LOS: 2 days | Discharge: HOME | DRG: 885 | End: 2020-02-21
Attending: EMERGENCY MEDICINE | Admitting: PSYCHIATRY & NEUROLOGY
Payer: MEDICAID

## 2020-02-19 LAB
ALBUMIN SERPL-MCNC: 4.6 G/DL (ref 3.5–5.2)
ALP BLD-CCNC: 79 U/L (ref 35–104)
ALT SERPL-CCNC: 16 U/L (ref 5–33)
AMPHETAMINE SCREEN, URINE: POSITIVE
ANION GAP SERPL CALCULATED.3IONS-SCNC: 13 MMOL/L (ref 7–19)
AST SERPL-CCNC: 21 U/L (ref 5–32)
BARBITURATE SCREEN URINE: NEGATIVE
BASOPHILS ABSOLUTE: 0 K/UL (ref 0–0.2)
BASOPHILS RELATIVE PERCENT: 0.2 % (ref 0–1)
BENZODIAZEPINE SCREEN, URINE: NEGATIVE
BILIRUB SERPL-MCNC: 0.4 MG/DL (ref 0.2–1.2)
BUN BLDV-MCNC: 17 MG/DL (ref 6–20)
CALCIUM SERPL-MCNC: 9.8 MG/DL (ref 8.6–10)
CANNABINOID SCREEN URINE: POSITIVE
CHLORIDE BLD-SCNC: 97 MMOL/L (ref 98–111)
CO2: 28 MMOL/L (ref 22–29)
COCAINE METABOLITE SCREEN URINE: NEGATIVE
CREAT SERPL-MCNC: 0.5 MG/DL (ref 0.5–0.9)
EOSINOPHILS ABSOLUTE: 0.1 K/UL (ref 0–0.6)
EOSINOPHILS RELATIVE PERCENT: 1.3 % (ref 0–5)
ETHANOL: <10 MG/DL (ref 0–0.08)
GFR NON-AFRICAN AMERICAN: >60
GLUCOSE BLD-MCNC: 100 MG/DL (ref 74–109)
HCG QUALITATIVE: NEGATIVE
HCT VFR BLD CALC: 42.9 % (ref 37–47)
HEMOGLOBIN: 13.8 G/DL (ref 12–16)
IMMATURE GRANULOCYTES #: 0 K/UL
LYMPHOCYTES ABSOLUTE: 2.2 K/UL (ref 1.1–4.5)
LYMPHOCYTES RELATIVE PERCENT: 24.7 % (ref 20–40)
Lab: ABNORMAL
MCH RBC QN AUTO: 32.2 PG (ref 27–31)
MCHC RBC AUTO-ENTMCNC: 32.2 G/DL (ref 33–37)
MCV RBC AUTO: 100.2 FL (ref 81–99)
MONOCYTES ABSOLUTE: 0.8 K/UL (ref 0–0.9)
MONOCYTES RELATIVE PERCENT: 9.1 % (ref 0–10)
NEUTROPHILS ABSOLUTE: 5.7 K/UL (ref 1.5–7.5)
NEUTROPHILS RELATIVE PERCENT: 64.4 % (ref 50–65)
OPIATE SCREEN URINE: NEGATIVE
PDW BLD-RTO: 13 % (ref 11.5–14.5)
PLATELET # BLD: 397 K/UL (ref 130–400)
PMV BLD AUTO: 9.7 FL (ref 9.4–12.3)
POTASSIUM SERPL-SCNC: 3.9 MMOL/L (ref 3.5–5)
RBC # BLD: 4.28 M/UL (ref 4.2–5.4)
SODIUM BLD-SCNC: 138 MMOL/L (ref 136–145)
TOTAL PROTEIN: 8.4 G/DL (ref 6.6–8.7)
WBC # BLD: 8.9 K/UL (ref 4.8–10.8)

## 2020-02-19 PROCEDURE — G0480 DRUG TEST DEF 1-7 CLASSES: HCPCS

## 2020-02-19 PROCEDURE — 80053 COMPREHEN METABOLIC PANEL: CPT

## 2020-02-19 PROCEDURE — 99285 EMERGENCY DEPT VISIT HI MDM: CPT

## 2020-02-19 PROCEDURE — 85025 COMPLETE CBC W/AUTO DIFF WBC: CPT

## 2020-02-19 PROCEDURE — 84703 CHORIONIC GONADOTROPIN ASSAY: CPT

## 2020-02-19 PROCEDURE — 1240000000 HC EMOTIONAL WELLNESS R&B

## 2020-02-19 PROCEDURE — 36415 COLL VENOUS BLD VENIPUNCTURE: CPT

## 2020-02-19 ASSESSMENT — ENCOUNTER SYMPTOMS
SORE THROAT: 0
NAUSEA: 0
SHORTNESS OF BREATH: 0
DIARRHEA: 0
COUGH: 0
RHINORRHEA: 0
BACK PAIN: 0
ABDOMINAL PAIN: 0
VOMITING: 0

## 2020-02-19 NOTE — ED PROVIDER NOTES
MEDICALHISTORY     Past Medical History:   Diagnosis Date    Anxiety     Autism     Bipolar 1 disorder ([facility] [address]     Depression          SURGICAL HISTORY       Past Surgical History:   Procedure Laterality Date    TONSILLECTOMY AND ADENOIDECTOMY           CURRENT MEDICATIONS     Previous Medications    No medications on file       ALLERGIES     Amoxicillin and Pcn [penicillins]    FAMILY HISTORY     History reviewed. No pertinent family history.        SOCIAL HISTORY       Social History     Socioeconomic History    Marital status: Single     Spouse name: None    Number of children: None    Years of education: None    Highest education level: None   Occupational History    None   Social Needs    Financial resource strain: None    Food insecurity:     Worry: None     Inability: None    Transportation needs:     Medical: None     Non-medical: None   Tobacco Use    Smoking status: Current Some Day Smoker    Smokeless tobacco: Never Used   Substance and Sexual Activity    Alcohol use: Yes     Comment: vodka daily-pint or more     Drug use: Yes     Types: Methamphetamines     Comment:  spice, Lortab & percocet     Sexual activity: Yes   Lifestyle    Physical activity:     Days per week: None     Minutes per session: None    Stress: None   Relationships    Social connections:     Talks on phone: None     Gets together: None     Attends Restoration service: None     Active member of club or organization: None     Attends meetings of clubs or organizations: None     Relationship status: None    Intimate partner violence:     Fear of current or ex partner: None     Emotionally abused: None     Physically abused: None     Forced sexual activity: None   Other Topics Concern    None   Social History Narrative    None       SCREENINGS             PHYSICAL EXAM    (up to 7 for level 4, 8 or more for level 5)     ED Triage Vitals [02/19/20 1406]   BP Temp Temp src Pulse Resp SpO2 Height Weight   119/79 98.5 °F (36.9 °C) -- 99 22 97 % -- 93 lb (42.2 kg)       Physical Exam  Vitals signs reviewed. Constitutional:       General: She is not in acute distress. Appearance: She is underweight. She is not diaphoretic. Comments: Unkempt appearance   HENT:      Head: Normocephalic and atraumatic. Right Ear: External ear normal.      Left Ear: External ear normal.   Eyes:      Conjunctiva/sclera: Conjunctivae normal.   Neck:      Musculoskeletal: Normal range of motion. Trachea: No tracheal deviation. Cardiovascular:      Rate and Rhythm: Normal rate and regular rhythm. Heart sounds: Normal heart sounds. No murmur. Pulmonary:      Effort: No respiratory distress. Breath sounds: Normal breath sounds. No wheezing or rales. Abdominal:      Palpations: Abdomen is soft. There is no mass. Tenderness: There is no abdominal tenderness. Musculoskeletal: Normal range of motion. Skin:     General: Skin is warm and dry. Neurological:      Mental Status: She is alert and oriented to person, place, and time. GCS: GCS eye subscore is 4. GCS verbal subscore is 5. GCS motor subscore is 6. Psychiatric:         Mood and Affect: Mood is depressed. Affect is flat. Thought Content: Thought content is not paranoid or delusional. Thought content includes suicidal ideation. Thought content does not include homicidal ideation. Thought content does not include suicidal plan.          DIAGNOSTIC RESULTS           No orders to display           LABS:  Labs Reviewed   CBC WITH AUTO DIFFERENTIAL - Abnormal; Notable for the following components:       Result Value    .2 (*)     MCH 32.2 (*)     MCHC 32.2 (*)     All other components within normal limits   COMPREHENSIVE METABOLIC PANEL - Abnormal; Notable for the following components:    Chloride 97 (*)     All other components within normal limits   ETHANOL   HCG, SERUM, QUALITATIVE   URINE DRUG SCREEN       All other labs were within normal range or not returned as of this dictation. EMERGENCY DEPARTMENT COURSE and DIFFERENTIAL DIAGNOSIS/MDM:   Vitals:    Vitals:    02/19/20 1406   BP: 119/79   Pulse: 99   Resp: 22   Temp: 98.5 °F (36.9 °C)   SpO2: 97%   Weight: 93 lb (42.2 kg)       MDM  Number of Diagnoses or Management Options     Amount and/or Complexity of Data Reviewed  Clinical lab tests: ordered and reviewed  Discuss the patient with other providers: yes      SI no plan meth abuse, once medically cleared will need to d/w psych on call, no evaluator today, my shift is ending, Dr. Liz Purdy assuming care      CONSULTS:  None    PROCEDURES:  Unless otherwise noted below, none     Procedures    FINAL IMPRESSION      1. Depression with suicidal ideation    2. Drug abuse ([facility] [address]          DISPOSITION/PLAN   DISPOSITION        PATIENT REFERRED TO:  No follow-up provider specified.     DISCHARGE MEDICATIONS:  New Prescriptions    No medications on file          (Please note that portions of this note were completed with a voice recognition program.  Efforts were made to edit thedictations but occasionally words are mis-transcribed.)    Chad Mello MD (electronically signed)  Attending Emergency Physician        Migue White MD  02/19/20 4950

## 2020-02-19 NOTE — ED PROVIDER NOTES
See Dr. Lan Ojeda note. H/o depression, off meds x 2 months. SI. Discussed with Dr. Alfonzo Tellez - admit.      Janice Black MD  02/19/20 3131

## 2020-02-20 PROBLEM — F31.9 AFFECTIVE PSYCHOSIS, BIPOLAR (HCC): Status: ACTIVE | Noted: 2020-02-20

## 2020-02-20 PROCEDURE — 99223 1ST HOSP IP/OBS HIGH 75: CPT | Performed by: PSYCHIATRY & NEUROLOGY

## 2020-02-20 PROCEDURE — 1240000000 HC EMOTIONAL WELLNESS R&B

## 2020-02-20 PROCEDURE — 6370000000 HC RX 637 (ALT 250 FOR IP): Performed by: PSYCHIATRY & NEUROLOGY

## 2020-02-20 RX ORDER — POLYETHYLENE GLYCOL 3350 17 G/17G
17 POWDER, FOR SOLUTION ORAL DAILY PRN
Status: DISCONTINUED | OUTPATIENT
Start: 2020-02-20 | End: 2020-02-21 | Stop reason: HOSPADM

## 2020-02-20 RX ORDER — TRAZODONE HYDROCHLORIDE 50 MG/1
50 TABLET ORAL NIGHTLY PRN
Status: DISCONTINUED | OUTPATIENT
Start: 2020-02-20 | End: 2020-02-21 | Stop reason: HOSPADM

## 2020-02-20 RX ORDER — CITALOPRAM 20 MG/1
20 TABLET ORAL DAILY
Status: DISCONTINUED | OUTPATIENT
Start: 2020-02-20 | End: 2020-02-21 | Stop reason: HOSPADM

## 2020-02-20 RX ORDER — ERGOCALCIFEROL 1.25 MG/1
50000 CAPSULE ORAL WEEKLY
Status: DISCONTINUED | OUTPATIENT
Start: 2020-02-20 | End: 2020-02-21 | Stop reason: HOSPADM

## 2020-02-20 RX ORDER — LORAZEPAM 1 MG/1
1 TABLET ORAL EVERY 6 HOURS PRN
Status: DISCONTINUED | OUTPATIENT
Start: 2020-02-20 | End: 2020-02-21 | Stop reason: HOSPADM

## 2020-02-20 RX ORDER — HALOPERIDOL 5 MG
5 TABLET ORAL EVERY 6 HOURS PRN
Status: DISCONTINUED | OUTPATIENT
Start: 2020-02-20 | End: 2020-02-21 | Stop reason: HOSPADM

## 2020-02-20 RX ORDER — ACETAMINOPHEN 325 MG/1
650 TABLET ORAL EVERY 4 HOURS PRN
Status: DISCONTINUED | OUTPATIENT
Start: 2020-02-20 | End: 2020-02-21 | Stop reason: HOSPADM

## 2020-02-20 RX ADMIN — ACETAMINOPHEN 650 MG: 325 TABLET ORAL at 10:08

## 2020-02-20 RX ADMIN — HALOPERIDOL 5 MG: 5 TABLET ORAL at 20:16

## 2020-02-20 RX ADMIN — DIVALPROEX SODIUM 750 MG: 500 TABLET, EXTENDED RELEASE ORAL at 16:54

## 2020-02-20 RX ADMIN — TRAZODONE HYDROCHLORIDE 50 MG: 50 TABLET ORAL at 20:00

## 2020-02-20 RX ADMIN — CITALOPRAM HYDROBROMIDE 20 MG: 20 TABLET ORAL at 16:55

## 2020-02-20 RX ADMIN — ACETAMINOPHEN 650 MG: 325 TABLET ORAL at 16:59

## 2020-02-20 RX ADMIN — ERGOCALCIFEROL 50000 UNITS: 1.25 CAPSULE, LIQUID FILLED ORAL at 16:54

## 2020-02-20 RX ADMIN — ACETAMINOPHEN 650 MG: 325 TABLET ORAL at 02:23

## 2020-02-20 ASSESSMENT — PAIN DESCRIPTION - PAIN TYPE
TYPE: OTHER (COMMENT)

## 2020-02-20 ASSESSMENT — PAIN DESCRIPTION - FREQUENCY
FREQUENCY: INTERMITTENT
FREQUENCY: INTERMITTENT
FREQUENCY: CONTINUOUS
FREQUENCY: OTHER (COMMENT)

## 2020-02-20 ASSESSMENT — PAIN DESCRIPTION - LOCATION
LOCATION: OTHER (COMMENT)
LOCATION: OTHER (COMMENT)

## 2020-02-20 ASSESSMENT — PAIN DESCRIPTION - ONSET
ONSET: ON-GOING

## 2020-02-20 ASSESSMENT — PAIN - FUNCTIONAL ASSESSMENT
PAIN_FUNCTIONAL_ASSESSMENT: PREVENTS OR INTERFERES SOME ACTIVE ACTIVITIES AND ADLS

## 2020-02-20 ASSESSMENT — PAIN DESCRIPTION - DIRECTION: RADIATING_TOWARDS: GENERALIZED

## 2020-02-20 ASSESSMENT — PAIN SCALES - GENERAL
PAINLEVEL_OUTOF10: 10
PAINLEVEL_OUTOF10: 4
PAINLEVEL_OUTOF10: 10
PAINLEVEL_OUTOF10: 7
PAINLEVEL_OUTOF10: 7

## 2020-02-20 ASSESSMENT — PAIN DESCRIPTION - DESCRIPTORS
DESCRIPTORS: DISCOMFORT;HEADACHE
DESCRIPTORS: DISCOMFORT;DULL
DESCRIPTORS: ACHING
DESCRIPTORS: PATIENT UNABLE TO DESCRIBE

## 2020-02-20 ASSESSMENT — PAIN DESCRIPTION - PROGRESSION
CLINICAL_PROGRESSION: GRADUALLY IMPROVING
CLINICAL_PROGRESSION: GRADUALLY IMPROVING
CLINICAL_PROGRESSION: NOT CHANGED
CLINICAL_PROGRESSION: NOT CHANGED

## 2020-02-20 ASSESSMENT — PAIN DESCRIPTION - ORIENTATION
ORIENTATION: OTHER (COMMENT)

## 2020-02-20 NOTE — H&P
ideations, but denies any suicidal plans or homicidal ideations. Also she denies any auditory and visual hallucinations. PSYCHIATRIC HISTORY:    Diagnoses: Bipolar disorder, depression, opioid use disorder, methamphetamine use disorder, cannabis use disorder, synthetic marijuana use disorder  Suicide attempts/gestures: \"Too many to count\"  Prior hospitalizations: Multiple to [facility] and [facility] with last admission 1.5 months ago  Medication trials: Zyprexa, Seroquel, Zoloft, Celexa, Depakote  Mental health contact: Lost to follow-up       Past Medical History:        Diagnosis Date    Anxiety     Autism     Bipolar 1 disorder ([facility] [address]     Depression      Past Surgical History:        Procedure Laterality Date    TONSILLECTOMY AND ADENOIDECTOMY       Medications Prior to Admission:   No medications prior to admission. Allergies:  Amoxicillin and Pcn [penicillins]    Social History:  Tobacco - 0.5 PPD for 15 years  Alcohol - drinks daily 1 pint of vodka. Illicit drugs - history of using heroin, opioid medication, Percocet, crystal meth, smokes marijuana and synthetic marijuana. Patient reported that most recently she used meth amphetamine \"every single day\", and smokes marijuana and spice daily. Family History:   History reviewed. No pertinent family history. Psychiatric Family History  Patient is unable to give family history at this time. REVIEW OF SYSTEMS:  General: Patient endorses feeling of anxiety, depression, decreased appetite, poor sleep. No fevers, chills, night sweats, no recent weight loss or gain. Head: No headache, no migraine. Eyes: No recent visual changes. Ears: No recent hearing changes. Nose: No increased congestion or change in sense of smell. Throat: No sore throat, no trouble swallowing. Cardiovascular: No chest pain or palpitations, or dizziness. Respiratory: No cough, wheezes, congestion, or shortness of breath.   Gastrointestinal: No abdominal pain, nausea or vomiting, no diarrhea or constipation. Musculo-skeletal: No edema, deformities, or loss of functions. Neurological: No loss of consciousness, abnormal sensations, or weakness. Skin: No rash, abrasions or bruises. PHYSICAL EXAM:    Vitals:  /64   Pulse 72   Temp 99 °F (37.2 °C) (Temporal)   Resp 16   Wt 93 lb (42.2 kg)   SpO2 100%   BMI 15.01 kg/m²     Mental Status Examination:   Appearance: Poorly groomed, seems still intoxicated, looks tired and sleepy. Wearing hospital attire. Made no eye contact. Behavior: Withdrawn, anxious, not cooperative with interview. Moderate psychomotor agitation appreciated. Gait unremarkable. Speech: Increased in tone, decreased in volume and quality. Mood: \"Very very very bad\"   Affect: Mood congruent. Range is intense. Thought Process: Disorganized  Thought Content: Patient does have current active suicidal ideations. She does not have any suicidal plans or homicidal ideations. No overt delusions or paranoia appreciated. Perceptions: Seems patient does not have any auditory or visual hallucinations at present time. Patient did not appear to be responding to internal stimuli. No overt psychosis. Executive Functions: Appear poor. Concentration: Poor. poor based on interaction from interview   Orientation: to person, place and situation. Alert. Language: Intact. Fund of information: Intact. Memory: recent and remote appear mildly impaired. Impulsivity: Poor  Neurovegitative: Decreased appetite, decreased sleep  Insight: Poor. Judgment: Impaired. Physical Exam:  GENERAL APPEARANCE: 34y.o. year-old female in NAD   HEAD: Normocephalic, atraumatic. THROAT: No erythema, exudates, lesions. No tongue laceration. CARDIOVASCULAR: PMI nondisplaced. Regular rhythm and rate. Normal S1 and S2.  PULMONARY: Clear to auscultation bilaterally, no tenderness to palpation. ABDOMEN: Soft, nontender, nondistended.

## 2020-02-20 NOTE — PROGRESS NOTES
Admission Note      Reason for admission/Target Symptom: Patient admitted to [facility] due to female who presents to the emergency department for mental health evaluation. The patient states that she got out of a rehab facility 10 days ago in [facility] where she spent 16 days there. She states that she has been homeless living on the streets in [facility]. She states that she is depressed and suicidal with no specific plans. She walked to go see her mom and get a drink there and she is the one that called an ambulance and had her brought here for evaluation. Tells me that she is on disability for her autism. She admits to smoking and starting methamphetamine this morning. She also admits to intermittently abusing spice and alcohol. Diagnoses:  Depression NOS  UDS: Amphetamine, Cannabinoid   BAL:  Neg    SW met with treatment team to discuss patient's treatment including care planning, discharge planning, and follow-up needs. Pt has been admitted to [facility]. Treatment team has identified patient's discharge needs as medication management and outpatient therapy/counseling. Pt confirmed  the need for ongoing treatment post inpatient stay. Pt was also provided a handout of contact information for drug and alcohol treatment centers and other community support service such as [facility], AA, and [facility] .

## 2020-02-20 NOTE — PROGRESS NOTES
Signed          CSW completed psychosocial and CSSR-S on this date. Pt long and short term goals discussed. Pt voiced understanding. Treatment plan sheet signed. Pt verbalized understanding of the treatment plan. Pt participated in goals and objectives of the treatment plan. Completed safety plan with pt and pt received copy of safety plan. Safety plan placed in chart. It was identified that pt will require outpatient follow up appointments at local community behavioral health facility such as; [facility] [address] . Pt validated need for appointments. Pt was also provided a handout of contact information for drug and alcohol treatment centers and other community support service such as [facility] and [facility]. In the last 6 months has the pt been danger to self: YES  In the last 6 months has the pt been danger to others:  NO     Provided pt with [employer] handout entitled \"Quitting Smoking,\" reviewed handout with pt addressing dangers of smoking, developing coping skills, and providing basic information about quitting. Patient declined practical counseling of tobacco practical counseling during the hospital stay.

## 2020-02-20 NOTE — PLAN OF CARE
Group Therapy Note    Date: 2/20/2020  Start Time: 1100  End Time:  8387  Number of Participants: 6    Type of Group: Psychoeducation    Wellness Binder Information  Module Name:  Stress  Session Number:  3    Patient's Goal:  Preventing stress    Notes: Pt did not attend group as scheduled. SW invited and encouraged pt to attend group. However, pt still refused/declined. Nursing notified.      Discipline Responsible: /Counselor      Signature:  Massimo Mirza [facility]

## 2020-02-20 NOTE — PROGRESS NOTES
Treatment Team Note:    JANET met with [address] team to discuss Pts [address] plans. Progress/Behavior/Group Attendance: TBD    Target Symptoms/Reason for admission: Patient admitted to [facility] due to [facility] presents to the emergency department for mental health evaluation.  The patient states that she got out of a rehab facility 10 days ago in [facility] where she spent 16 days there. Sonia Hilton states that she has been homeless living on the streets in [address] [place] [address]. Sonia Hilton states that she is depressed and suicidal with no specific plans.  She walked to go see her mom and get a drink there and she is the one that called an ambulance and had her brought here for evaluation.  Tells me that she is on disability for her autism. Sonia Hilton admits to smoking and starting methamphetamine this morning.  She also admits to intermittently abusing spice and alcohol. Diagnoses:  Depression NOS  UDS: Amphetamine, Cannabinoid   BAL:  Neg        AftercarePlan: [address] lives with: JANET will meet with pt to gather information. Collateral obtained from: JANET will meet with pt to gather release of information.   On:    Family Session: HOANG    Misc:

## 2020-02-21 VITALS
TEMPERATURE: 99.1 F | DIASTOLIC BLOOD PRESSURE: 42 MMHG | OXYGEN SATURATION: 100 % | RESPIRATION RATE: 16 BRPM | BODY MASS INDEX: 15.01 KG/M2 | WEIGHT: 93 LBS | HEART RATE: 87 BPM | SYSTOLIC BLOOD PRESSURE: 105 MMHG

## 2020-02-21 PROBLEM — F11.10 OPIOID ABUSE (HCC): Status: RESOLVED | Noted: 2017-11-13 | Resolved: 2020-02-21

## 2020-02-21 PROBLEM — F32.A DEPRESSION: Status: RESOLVED | Noted: 2019-12-31 | Resolved: 2020-02-21

## 2020-02-21 PROBLEM — F31.9 AFFECTIVE PSYCHOSIS, BIPOLAR (HCC): Status: RESOLVED | Noted: 2020-02-20 | Resolved: 2020-02-21

## 2020-02-21 PROBLEM — F19.20 SYNTHETIC CANNABINOID DEPENDENCE (HCC): Status: RESOLVED | Noted: 2019-12-31 | Resolved: 2020-02-21

## 2020-02-21 PROBLEM — F31.32 BIPOLAR AFFECTIVE DISORDER, CURRENTLY DEPRESSED, MODERATE (HCC): Status: RESOLVED | Noted: 2019-12-31 | Resolved: 2020-02-21

## 2020-02-21 PROBLEM — F19.94 SUBSTANCE INDUCED MOOD DISORDER (HCC): Status: RESOLVED | Noted: 2017-11-13 | Resolved: 2020-02-21

## 2020-02-21 PROCEDURE — 99239 HOSP IP/OBS DSCHRG MGMT >30: CPT | Performed by: PSYCHIATRY & NEUROLOGY

## 2020-02-21 PROCEDURE — 6370000000 HC RX 637 (ALT 250 FOR IP): Performed by: PSYCHIATRY & NEUROLOGY

## 2020-02-21 PROCEDURE — 5130000000 HC BRIDGE APPOINTMENT

## 2020-02-21 RX ORDER — DIVALPROEX SODIUM 250 MG/1
750 TABLET, EXTENDED RELEASE ORAL DAILY
Qty: 90 TABLET | Refills: 1 | Status: SHIPPED | OUTPATIENT
Start: 2020-02-22 | End: 2020-03-23

## 2020-02-21 RX ORDER — CITALOPRAM 20 MG/1
20 TABLET ORAL DAILY
Qty: 30 TABLET | Refills: 1 | Status: SHIPPED | OUTPATIENT
Start: 2020-02-21 | End: 2020-03-22

## 2020-02-21 RX ORDER — TRAZODONE HYDROCHLORIDE 50 MG/1
50 TABLET ORAL NIGHTLY PRN
Qty: 30 TABLET | Refills: 1 | Status: SHIPPED | OUTPATIENT
Start: 2020-02-21 | End: 2020-03-22

## 2020-02-21 RX ORDER — ERGOCALCIFEROL 1.25 MG/1
50000 CAPSULE ORAL WEEKLY
Qty: 11 CAPSULE | Refills: 0 | Status: SHIPPED | OUTPATIENT
Start: 2020-02-21 | End: 2020-05-02

## 2020-02-21 RX ADMIN — DIVALPROEX SODIUM 750 MG: 500 TABLET, EXTENDED RELEASE ORAL at 09:02

## 2020-02-21 RX ADMIN — ACETAMINOPHEN 650 MG: 325 TABLET ORAL at 09:04

## 2020-02-21 RX ADMIN — CITALOPRAM HYDROBROMIDE 20 MG: 20 TABLET ORAL at 09:03

## 2020-02-21 ASSESSMENT — PAIN - FUNCTIONAL ASSESSMENT
PAIN_FUNCTIONAL_ASSESSMENT: PREVENTS OR INTERFERES SOME ACTIVE ACTIVITIES AND ADLS
PAIN_FUNCTIONAL_ASSESSMENT: PREVENTS OR INTERFERES SOME ACTIVE ACTIVITIES AND ADLS

## 2020-02-21 ASSESSMENT — PAIN DESCRIPTION - ORIENTATION
ORIENTATION: OTHER (COMMENT)
ORIENTATION: OTHER (COMMENT)

## 2020-02-21 ASSESSMENT — PAIN SCALES - GENERAL
PAINLEVEL_OUTOF10: 3
PAINLEVEL_OUTOF10: 10

## 2020-02-21 ASSESSMENT — PAIN DESCRIPTION - DIRECTION: RADIATING_TOWARDS: HEADACHE

## 2020-02-21 ASSESSMENT — PAIN DESCRIPTION - PAIN TYPE
TYPE: CHRONIC PAIN
TYPE: CHRONIC PAIN

## 2020-02-21 ASSESSMENT — PAIN DESCRIPTION - LOCATION
LOCATION: HEAD
LOCATION: HEAD

## 2020-02-21 ASSESSMENT — PAIN DESCRIPTION - ONSET
ONSET: ON-GOING
ONSET: ON-GOING

## 2020-02-21 ASSESSMENT — PAIN DESCRIPTION - FREQUENCY
FREQUENCY: INTERMITTENT
FREQUENCY: CONTINUOUS

## 2020-02-21 ASSESSMENT — PAIN DESCRIPTION - PROGRESSION
CLINICAL_PROGRESSION: GRADUALLY IMPROVING
CLINICAL_PROGRESSION: GRADUALLY WORSENING

## 2020-02-21 ASSESSMENT — PAIN DESCRIPTION - DESCRIPTORS
DESCRIPTORS: THROBBING
DESCRIPTORS: THROBBING

## 2020-02-21 NOTE — DISCHARGE SUMMARY
Discharge Summary     Patient ID:  Isiah Ashley  [id number]  26 y.o.  1990    Admit date: 2/19/2020  Discharge date: 2/21/2020    Admitting Physician: Edgardo Francis MD   Attending Physician: Mingo Escalante MD  Discharge Provider: Edgardo Francis MD     Admission Diagnoses:   Bipolar affective disorder, most recent episode mixed without psychotic features  Suicidal ideation  Methamphetamine use disorder, severe   Synthetic cannabinoid use disorder, severe  Cannabis use disorder, severe   Opioid use disorder, severe, in remission  Treatment noncompliance    Discharge Diagnoses:   Bipolar affective disorder, most recent episode mixed without psychotic features  Methamphetamine use disorder, severe   Synthetic cannabinoid use disorder, severe  Cannabis use disorder, severe   Opioid use disorder, severe, in remission  Vitamin D deficiency    Admission Condition: poor    Discharged Condition: stable    Indication for Admission: suicidal ideation    CHIEF COMPLAINT:  Drug intoxication, suicidal ideations  History obtained from:  patient     HISTORY OF PRESENT ILLNESS:    The patient is a 34 y.o. female with [facility] psychiatric history of depression, bipolar disorder, opioid use disorder, methamphetamine use disorder, cannabis use disorder, synthetic marijuana use disorder, who has been admitted to psychiatric unit secondary to methamphetamine intoxication, suicidal ideations.     Patient has been seen in treatment team room. It seems that she still was under the influence of the drugs, as she was not able to fully participate in interview, has disorganized thought process, poor judgment and poor insight.     Patient is well-known to psychiatry due to previous admissions to our hospital.  Patient has been discharged from the hospital last time 1.5 months ago. However, patient reported that she did not follow-up with her appointments and was not compliant with prescribed medications.   Stated that she immediately tolerated all her medications well, without side effects, and was compliant. Patient was irritable, loud, and rude with staff. Psychotic features resolved. There was no evidence of suicidality. Patient was noncompliant with group activities. Sleep and appetite improved. With the above-mentioned medications changes as well as psychotherapeutic interventions, patient reported considerable improvement in her condition and requested discharge home. She was planning to [facility], and resources were provided. On 2/21/2020 it was therefore decided to discharge the patient, as it was felt that she received maximal benefit from her hospitalization. This patient is not suicidal, homicidal or psychotic at discharge. She does not present danger to self or others.       Number of antipsychotic medication prescribed at discharge: 0  IF MORE THAN ONE IS USED: NA    History of greater than 3 failed multiple monotherapy trials: NA  Monotherapy taper plan/ cross taper in progress: NA  Augmentation of Clozapine: NA    Referral to addiction treatment: patient refused    Prescription for Alcohol or Drug Disorder Medication: patient refused    Prescription for Tobacco Cessation medication: none    If no prescriptions for Tobacco Cessation must document why: Patient refused    Consults: Internal medicine    Significant Diagnostic Studies:   Lab Results   Component Value Date    WBC 8.9 02/19/2020    HGB 13.8 02/19/2020    HCT 42.9 02/19/2020    .2 (H) 02/19/2020     02/19/2020     Lab Results   Component Value Date     02/19/2020    K 3.9 02/19/2020    CL 97 (L) 02/19/2020    CO2 28 02/19/2020    BUN 17 02/19/2020    CREATININE 0.5 02/19/2020    GLUCOSE 100 02/19/2020    CALCIUM 9.8 02/19/2020    PROT 8.4 02/19/2020    LABALBU 4.6 02/19/2020    BILITOT 0.4 02/19/2020    ALKPHOS 79 02/19/2020    AST 21 02/19/2020    ALT 16 02/19/2020    LABGLOM >60 02/19/2020       Lab Results   Component Value Date    VHIHDZSR40 090 01/02/2020     Lab Results   Component Value Date    VITD25 21.7 (L) 01/02/2020     Lab Results   Component Value Date    CHOL 87 (L) 01/03/2020    CHOL 95 (L) 11/15/2017     Lab Results   Component Value Date    TRIG 79 01/03/2020    TRIG 86 11/15/2017     Lab Results   Component Value Date    HDL 38 (L) 01/03/2020    HDL 30 (L) 11/15/2017     Lab Results   Component Value Date    LDLCALC 33 01/03/2020    LDLCALC 48 11/15/2017     No results found for: LABVLDL, VLDL  No results found for: CHOLHDLRATIO  Lab Results   Component Value Date    LABA1C 5.3 01/03/2020     No results found for: EAG  Lab Results   Component Value Date    TSHFT4 0.76 01/02/2020    TSH 0.200 (L) 11/14/2017       Treatments: RN and SW    Mental status examination at the time of discharge:  Alert, Oriented X 4  Appearance:  Improved Hygiene  Speech with Regular Rate and Rhythm  Eye Contact:  Good  No Psychomotor Agitation/Retardation Noted  Attitude:  Cooperative  Mood:  \"ok\"  Affective: Congruent, appropriate to the situation, with a normal range and intensity  Thought Processes:  Coherently communicated, logical and goal oriented  Thought Content:  No Suicidal Ideation, No Homicidal Ideation, No Auditory or Visual Hallucinations, NO Overt Delusions  Insight: Improved  Judgement: Improved  Memory is intact for both remote and recent  Intellectual Functioning:  Within the [address] of Knowledge:  Adequate  Attention and Concentration:  Adequate    Discharge Exam:  Please, see medical note    Disposition: home    Patient Instructions:      Medication List      CONTINUE taking these medications    citalopram 20 MG tablet  Commonly known as:  CELEXA  Take 1 tablet by mouth daily     divalproex 250 MG extended release tablet  Commonly known as:  DEPAKOTE ER  Take 3 tablets by mouth daily     traZODone 50 MG tablet  Commonly known as:  DESYREL  Take 1 tablet by mouth nightly as needed for Sleep     vitamin D

## 2020-02-21 NOTE — PLAN OF CARE
Problem: Coping - Ineffective, Individual:  Goal: Ability to identify and develop effective coping behavior will improve  Description  Ability to identify and develop effective coping behavior will improve  Outcome: Ongoing     Problem: Discharge Planning:  Goal: Absence of hematoma at arterial access site  Description  Participation in substance abuse program  Outcome: Ongoing  Goal: Discharged to appropriate level of care  Description  Discharged to appropriate level of care  Outcome: Ongoing     Problem: Health Maintenance - Impaired:  Goal: Ability to manage health-related needs will improve  Description  Ability to manage health-related needs will improve  Outcome: Ongoing  Goal: Ability to perform activities of daily living will improve  Description  Ability to perform activities of daily living will improve  Outcome: Ongoing  Goal: Able to sleep without medication for appropriate length of time  Description  Able to sleep without medication for appropriate length of time  Outcome: Ongoing  Goal: Maintenance of adequate nutrition will improve  Description  Maintenance of adequate nutrition will improve  Outcome: Ongoing     Problem: Mood - Altered:  Goal: Mood stable  Description  Mood stable  Outcome: Ongoing     Problem: Health Behavior:  Goal: Ability to verbalize adaptive coping strategies to use when suicidal feelings occur will improve  Description  Ability to verbalize adaptive coping strategies to use when suicidal feelings occur will improve  Outcome: Ongoing  Goal: Ability to verbalize adaptive coping strategies to use when the urge to self-mutilate occurs will improve  Description  Ability to verbalize adaptive coping strategies to use when the urge to self-mutilate occurs will improve  Outcome: Ongoing     Problem: Safety:  Goal: Ability to contract for his/her safety will improve  Description  Ability to contract for his/her safety will improve  Outcome: Ongoing

## 2020-02-21 NOTE — PROGRESS NOTES
[facility] Daily Shift Assessment  Nursing Progress Note    Room: 0602/602-02 Name: Monique Enamorado Age: 34 y.o. Gender: female   Dx: Persistent mood (affective) disorder, unspecified (HCC)  Precautions: suicide risk  Target Symptoms:   Accu-Chek: NoSleep: Yes,Sleep Quality Good SI no plan [facility] denies [address] [place] [address]  ADLs: No Speech: normal Depression: improved Anxiety: improved   Participation LevelNone  Appetite: Good  Visitation: Yes   Participation QualityAppropriate and Attentive    Complaints:    Notes:     Signature:  Brian Allison RN

## 2020-02-21 NOTE — PLAN OF CARE
Problem: Mood - Altered:  Goal: Mood stable  Description  Mood stable  2/21/2020 1159 by Mark Lloyd RN  Outcome: Ongoing  2/21/2020 0930 by Mark Lloyd RN  Outcome: Ongoing     Problem: Health Behavior:  Goal: Ability to verbalize adaptive coping strategies to use when suicidal feelings occur will improve  Description  Ability to verbalize adaptive coping strategies to use when suicidal feelings occur will improve  2/21/2020 1159 by Mark Lloyd RN  Outcome: Ongoing  2/21/2020 0930 by Mark Lloyd RN  Outcome: Ongoing  Goal: Ability to verbalize adaptive coping strategies to use when the urge to self-mutilate occurs will improve  Description  Ability to verbalize adaptive coping strategies to use when the urge to self-mutilate occurs will improve  2/21/2020 1159 by Mark Lloyd RN  Outcome: Ongoing  2/21/2020 0930 by Mark Lloyd RN  Outcome: Ongoing     Problem: Safety:  Goal: Ability to contract for his/her safety will improve  Description  Ability to contract for his/her safety will improve  2/21/2020 1159 by Mark Lloyd RN  Outcome: Ongoing  2/21/2020 0930 by Mark Lloyd RN  Outcome: Ongoing

## 2020-02-21 NOTE — PROGRESS NOTES
[facility] Adult Unit Daily Assessment  Nursing Progress Note    Room: [facility]602-02   Name: Niall Dee   Age: 34 y.o. Gender: female   Dx: <principal problem not specified>  Precautions: close watch  Inpatient Status: voluntary       SLEEP:    Sleep Quality Good  Sleep Medications: No   PRN Sleep Meds: Yes       MEDICAL:    Other PRN Meds: Yes   Med Compliant: Yes  Accu-Chek: No  Oxygen/CPAP/BiPAP: No  CIWA/CINA: No   PAIN Assessment: none  Side Effects from medication: none reported      PSYCH:    Depression: \"bad\"   Anxiety: \"high\"   SI denies suicidal ideation   HI Negative for homicidal ideation      AVH:Absent      GENERAL:    Appetite: decreased    Social: No   Speech: normal   Appearance: appropriately dressed, disheveled, poor hygiene and looks older    GROUP:    Group Participation: No  Participation Quality: None    Notes: Pt calm and cooperative, A&Ox4 at [employer]. Immediately following pt getting medication for sleep, pt got on the phone and began speaking loudly. Pt was asked to lower her voice 3 times before she was asked to get off the phone as she was yelling and cursing at that point. This nurse went to the phone area and asked her again to get off the phone. Pt raised her fist for a split second started calling this nurse names. Pt was calmly asked again to get off the phone, this time she complied. Pt continued to yell \"I am coming off all kinds of drugs and I am anti-social\". Pt made nonsensical threatening gestures and remarks while being escorted, without touching, to her room. Pt slammed the door and continued yelling. Pt provided PRN oral haldol without incident. Pt quietly sitting on her bed, calmly took the medication and proceeded to have a rational conversation in normal tones with this nurse. Pt has appeared to be sleeping well the majority of the night.     Electronically signed by Diann Ferrer RN on 2/21/20 at 2:04 AM

## 2021-07-31 NOTE — PROGRESS NOTES
Group Therapy Note    Start Time: 900  End Time:  293  Number of Participants: 10    Type of Group: Community Meeting       Patient's Goal:  \"Be better\"      Notes:      Participation Level:  Active Listener       Participation Quality: Appropriate      Thought Process/Content: Logical      Affective Functioning: Congruent      Mood: Calm      Level of consciousness:  Alert      Modes of Intervention: Support      Discipline Responsible: Behavioral Health Tech II      Signature:  Gena Sierra - C/w donepezil

## 2021-08-26 NOTE — PROGRESS NOTES
Earlier patient was agitated, yelling, and complaining of headache and feeling sick. Also did not attend morning group. After medication given (see mar) patient is now cooperative, smiling, happy, and attending group. She is very pleasant now. Yes - the patient is able to be screened

## 2021-11-09 NOTE — PLAN OF CARE
History     Chief Complaint   Patient presents with     Pharyngitis     HPI    History obtained from patient    Nellie is a 17 year old female who presents at  5:15 PM with her mother for sore throat. Nellie started last night with sore throat, odynophagia, with no fever, URI symptoms, headache, or abdominal pain.  Appetite and liquid intake has been her usual, urine and stools also normal.  There is no known exposure to strep, or COVID-19.  Sister at home with a URI.  She took ibuprofen for pain with good result.    PMHx:  No past medical history on file.  No past surgical history on file.  These were reviewed with the patient/family.    MEDICATIONS were reviewed and are as follows:   No current facility-administered medications for this encounter.     Current Outpatient Medications   Medication     acetaminophen (TYLENOL) 160 MG/5ML oral liquid     ciprofloxacin-hydrocortisone (CIPRO HC) 0.2-1 % otic suspension       ALLERGIES:  Patient has no known allergies.    IMMUNIZATIONS: Up-to-date by report.    SOCIAL HISTORY: Nellie lives with mother and siblings.  She does attend school.      I have reviewed the Medications, Allergies, Past Medical and Surgical History, and Social History in the Epic system.    Review of Systems  Please see HPI for pertinent positives and negatives.  All other systems reviewed and found to be negative.        Physical Exam   Pulse: 84  Temp: 99.3  F (37.4  C)  Resp: 18  Weight: 61.1 kg (134 lb 11.2 oz)  SpO2: 96 %      Physical Exam  Appearance: Alert and appropriate, well developed, nontoxic, with moist mucous membranes.  HEENT: Head: Normocephalic and atraumatic. Eyes: PERRL, EOM grossly intact, conjunctivae and sclerae clear. Ears: Tympanic membranes clear bilaterally, without inflammation or effusion. Nose: Nares clear with no active discharge.  Mouth/Throat: Erythematous pharynx with no exudate.  Neck: Supple, no masses, no meningismus. No significant cervical  Problem: Altered Mood, Depressive Behavior  Goal: STG-Knowledge of positive coping patterns  Outcome: Ongoing                                                                      Group Therapy Note    Date: 11/13/2017  Start Time: 1430  End Time:  0690  Number of Participants: 13    Type of Group: Cognitive Skills    Wellness Binder Information  Module Name:  Emotional wellness  Session Number:  4    Patient's Goal:  Self-esteem    Notes:  Pt was verbally prompted to attend group. Pt refused. Information about self-esteem was provided. Status After Intervention:      Participation Level:     Participation Quality:       Speech:         Thought Process/Content:       Affective Functioning:       Mood:       Level of consciousness:        Response to Learning:       Endings:     Modes of Intervention:       Discipline Responsible: Psychoeducational Specialist      Signature:  Praveen Begum lymphadenopathy.  Pulmonary: No grunting, flaring, retractions or stridor. Good air entry, clear to auscultation bilaterally, with no rales, rhonchi, or wheezing.  Cardiovascular: Regular rate and rhythm, normal S1 and S2, with no murmurs.  Normal symmetric peripheral pulses and brisk cap refill.  Abdominal: Normal bowel sounds, soft, nontender, nondistended, with no masses and no hepatosplenomegaly.  Neurologic: Alert and oriented, cranial nerves II-XII grossly intact, moving all extremities equally with grossly normal coordination and normal gait.  Extremities/Back: No deformity, no CVA tenderness.  Skin: No significant rashes, ecchymoses, or lacerations.  Genitourinary: Deferred  Rectal: Deferred    ED Course      Procedures    No results found for this or any previous visit (from the past 24 hour(s)).    Medications   ibuprofen (ADVIL/MOTRIN) tablet 600 mg (600 mg Oral Given 11/9/21 1648)       Old chart from [facility] Epic reviewed, noncontributory.  Labs reviewed and Pending at discharge.  Patient was attended to immediately upon arrival and assessed for immediate life-threatening conditions.  We have discussed the common side effects of acetaminophen and ibuprofen with the mother.  History obtained from family.    Critical care time:  none       Assessments & Plan (with Medical Decision Making)   Nellie is a 17 year old female who presents at  5:15 PM with her mother for sore throat.  Vital signs are normal.  Physical exam is benign, nontoxic, positive for an erythematous pharynx.  Rapid strep was obtained in the ED, pending at the time of discharge.  We will call the family with results.  There is no other findings on physical exam, patient is nontoxic, with no sign of a serious bacterial infection.  Diagnosis: Pharyngitis  Plan is to discharge her home on a regular diet for her age, encourage fluids, Tylenol/ibuprofen as needed for fever pain, follow-up by PCP in 2 or 3 days if not improving, will call with  results of the strep.  I have reviewed the nursing notes.    I have reviewed the findings, diagnosis, plan and need for follow up with the patient.  New Prescriptions    No medications on file       Final diagnoses:   Pharyngitis, unspecified etiology       11/9/2021   [facility] EMERGENCY DEPARTMENT     Cuauhtemoc Bonilla MD  11/09/21 9694

## 2023-06-16 ENCOUNTER — HOSPITAL ENCOUNTER (EMERGENCY)
Age: 33
Discharge: LEFT BEFORE DISCHARGE | End: 2023-06-16
Attending: EMERGENCY MEDICINE

## 2023-06-16 DIAGNOSIS — Z59.00 HOMELESS: Primary | ICD-10-CM

## 2023-06-16 SDOH — ECONOMIC STABILITY - HOUSING INSECURITY: HOMELESSNESS UNSPECIFIED: Z59.00

## 2023-06-16 ASSESSMENT — PAIN - FUNCTIONAL ASSESSMENT: PAIN_FUNCTIONAL_ASSESSMENT: NONE - DENIES PAIN

## 2023-06-16 ASSESSMENT — ENCOUNTER SYMPTOMS
SHORTNESS OF BREATH: 0
ABDOMINAL PAIN: 0

## 2023-06-16 NOTE — ED NOTES
[facility] EMERGENCY DEPT  eMERGENCY dEPARTMENT eNCOUnter      Pt Name: Mainor Whittaker  MRN: [id number]  [place] 1990  Date of evaluation: 6/16/2023  Provider: Toro Duval MD    CHIEF COMPLAINT       Chief Complaint   Patient presents with    Homeless         HISTORY OF PRESENT ILLNESS   (Location/Symptom, Timing/Onset,Context/Setting, Quality, Duration, Modifying Factors, Severity)  Note limiting factors. Mainor Whittaker is a 29 y.o. female who presents to the emergency department for homelessness. Patient states that her boyfriend went to senior care 2 months ago and she was prior living on his parents property but they kicked her off of their. Supposedly she has an EPO against her from her mom. Currently is homeless for the most part she tells me and has minimal money but she does draw check every months. She denies any suicidal or homicidal thoughts no delusions loose Nations or paranoia. Denies any drug use. HPI    NursingNotes were reviewed. REVIEW OF SYSTEMS    (2-9 systems for level 4, 10 or more for level 5)     Review of Systems   Constitutional:  Negative for fever. Respiratory:  Negative for shortness of breath. Cardiovascular:  Negative for chest pain. Gastrointestinal:  Negative for abdominal pain. Neurological:  Negative for dizziness and headaches. Psychiatric/Behavioral:  Negative for hallucinations and suicidal ideas.            PAST MEDICALHISTORY     Past Medical History:   Diagnosis Date    Anxiety     Autism     Bipolar 1 disorder ([facility] [address]     Depression          SURGICAL HISTORY       Past Surgical History:   Procedure Laterality Date    TONSILLECTOMY AND ADENOIDECTOMY           CURRENT MEDICATIONS     Previous Medications    CITALOPRAM (CELEXA) 20 MG TABLET    Take 1 tablet by mouth daily    DIVALPROEX (DEPAKOTE ER) 250 MG EXTENDED RELEASE TABLET    Take 3 tablets by mouth daily    TRAZODONE (DESYREL) 50 MG TABLET    Take 1 tablet by mouth nightly as needed for Sleep    VITAMIN D

## 2023-06-16 NOTE — ED NOTES
Pt left after Dr. Jake Keller spoke with pt. Pt states \"I think im just gonna leave\". Dakota Keller  06/16/23 9508    Pt left ER ambulatory without incident or gait deficit noted. No s/s of distress, pt alert and oriented x 4, MSE completed by Dr. Jake Keller prior to patient departure. Pt refused any further medical assistance. PT visualized on security camera exiting the building.       Lalo Hanson RN  06/16/23 3267

## 2023-06-16 NOTE — ED NOTES
Went to get pt back to a room. Pt was telling me about her hx. Pt was following back to the room and stated she will be back in a minute. Pt walked out of the ER waiting room without incident.                Lachelle Taylor RN  06/16/23 0189

## 2023-06-16 NOTE — ED PROVIDER NOTES
Pt eloped prior to my evaluation. Denied all psych complaints to staff. Homeless wanting food and shelter I am told.        Vijaya Ann MD  06/16/23 6845       Vijaya Ann MD  06/16/23 6865

## 2023-06-16 NOTE — ED NOTES
Asked pt why she was wanting to be seen. Pt stated she is homeless and her mother has an EPO on her. She stated that she needed shelter and food. I asked her if she has tried other shelters in the area for a place to stay. She stated she went to [address] [place] [address] and they told her they could not keep her. She stated they told her to come here. I asked the pt if she was SI or HI or needed to be seen for any Mental complaints and/or Medical complaints. She stated no that she had no complaints and was not HI/SI. She just needs shelter and food. Pt still wanted to be seen. Got pt back to the room and informed her that I will get vitals and get her checked in. Pt asked if she would have to see the doctor and \"all that other stuff\". Informed her that she will have to seen the doctor. While going to get a pulse ox for the pt the pt walked out of the ER to get her I-pad. Pt got her I-Pad and walked out of the ER waiting room. Pt AOx4. No respiratory distress. Ambulating with no issues. Left with no incident.       Thien Echavarria RN  06/16/23 6801

## 2023-06-21 ENCOUNTER — HOSPITAL ENCOUNTER (INPATIENT)
Age: 33
LOS: 5 days | Discharge: HOME | DRG: 885 | End: 2023-06-26
Attending: PSYCHIATRY & NEUROLOGY | Admitting: PSYCHIATRY & NEUROLOGY
Payer: MEDICAID

## 2023-06-21 DIAGNOSIS — F32.A DEPRESSION WITH SUICIDAL IDEATION: Primary | ICD-10-CM

## 2023-06-21 DIAGNOSIS — R45.851 DEPRESSION WITH SUICIDAL IDEATION: Primary | ICD-10-CM

## 2023-06-21 LAB
ALBUMIN SERPL-MCNC: 4.3 G/DL (ref 3.5–5.2)
ALP SERPL-CCNC: 60 U/L (ref 35–104)
ALT SERPL-CCNC: 18 U/L (ref 5–33)
AMPHET UR QL SCN: POSITIVE
ANION GAP SERPL CALCULATED.3IONS-SCNC: 7 MMOL/L (ref 7–19)
APAP SERPL-MCNC: <5 UG/ML (ref 10–30)
AST SERPL-CCNC: 23 U/L (ref 5–32)
BACTERIA URNS QL MICRO: ABNORMAL /HPF
BARBITURATES UR QL SCN: NEGATIVE
BASOPHILS # BLD: 0.1 K/UL (ref 0–0.2)
BASOPHILS NFR BLD: 0.4 % (ref 0–1)
BENZODIAZ UR QL SCN: NEGATIVE
BILIRUB SERPL-MCNC: 0.4 MG/DL (ref 0.2–1.2)
BILIRUB UR QL STRIP: NEGATIVE
BUN SERPL-MCNC: 19 MG/DL (ref 6–20)
BUPRENORPHINE URINE: NEGATIVE
CALCIUM SERPL-MCNC: 9.6 MG/DL (ref 8.6–10)
CANNABINOIDS UR QL SCN: POSITIVE
CHLORIDE SERPL-SCNC: 101 MMOL/L (ref 98–111)
CLARITY UR: ABNORMAL
CO2 SERPL-SCNC: 30 MMOL/L (ref 22–29)
COCAINE UR QL SCN: NEGATIVE
COLOR UR: YELLOW
CREAT SERPL-MCNC: 0.8 MG/DL (ref 0.5–0.9)
CRYSTALS URNS MICRO: ABNORMAL /HPF
DRUG SCREEN COMMENT UR-IMP: ABNORMAL
EOSINOPHIL # BLD: 0.3 K/UL (ref 0–0.6)
EOSINOPHIL NFR BLD: 2.1 % (ref 0–5)
EPI CELLS #/AREA URNS AUTO: 15 /HPF (ref 0–5)
ERYTHROCYTE [DISTWIDTH] IN BLOOD BY AUTOMATED COUNT: 12.8 % (ref 11.5–14.5)
ETHANOLAMINE SERPL-MCNC: <10 MG/DL (ref 0–0.08)
GLUCOSE SERPL-MCNC: 85 MG/DL (ref 74–109)
GLUCOSE UR STRIP.AUTO-MCNC: NEGATIVE MG/DL
HCT VFR BLD AUTO: 43.3 % (ref 37–47)
HGB BLD-MCNC: 14 G/DL (ref 12–16)
HGB UR STRIP.AUTO-MCNC: NEGATIVE MG/L
HYALINE CASTS #/AREA URNS AUTO: 8 /HPF (ref 0–8)
IMM GRANULOCYTES # BLD: 0.1 K/UL
KETONES UR STRIP.AUTO-MCNC: ABNORMAL MG/DL
LEUKOCYTE ESTERASE UR QL STRIP.AUTO: ABNORMAL
LYMPHOCYTES # BLD: 1.7 K/UL (ref 1.1–4.5)
LYMPHOCYTES NFR BLD: 11.3 % (ref 20–40)
MCH RBC QN AUTO: 32.9 PG (ref 27–31)
MCHC RBC AUTO-ENTMCNC: 32.3 G/DL (ref 33–37)
MCV RBC AUTO: 101.9 FL (ref 81–99)
METHADONE UR QL SCN: NEGATIVE
METHAMPHETAMINE, URINE: POSITIVE
MONOCYTES # BLD: 0.7 K/UL (ref 0–0.9)
MONOCYTES NFR BLD: 4.7 % (ref 0–10)
NEUTROPHILS # BLD: 12.3 K/UL (ref 1.5–7.5)
NEUTS SEG NFR BLD: 81.1 % (ref 50–65)
NITRITE UR QL STRIP.AUTO: POSITIVE
OPIATES UR QL SCN: NEGATIVE
OXYCODONE UR QL SCN: NEGATIVE
PCP UR QL SCN: NEGATIVE
PH UR STRIP.AUTO: 7 [PH] (ref 5–8)
PLATELET # BLD AUTO: 300 K/UL (ref 130–400)
PMV BLD AUTO: 9.5 FL (ref 9.4–12.3)
POTASSIUM SERPL-SCNC: 3.8 MMOL/L (ref 3.5–5)
PROPOXYPH UR QL SCN: NEGATIVE
PROT SERPL-MCNC: 7.3 G/DL (ref 6.6–8.7)
PROT UR STRIP.AUTO-MCNC: ABNORMAL MG/DL
RBC # BLD AUTO: 4.25 M/UL (ref 4.2–5.4)
RBC #/AREA URNS AUTO: 2 /HPF (ref 0–4)
SALICYLATES SERPL-MCNC: <0.3 MG/DL (ref 3–10)
SODIUM SERPL-SCNC: 138 MMOL/L (ref 136–145)
SP GR UR STRIP.AUTO: 1.03 (ref 1–1.03)
TRICYCLIC, URINE: NEGATIVE
UROBILINOGEN UR STRIP.AUTO-MCNC: 1 E.U./DL
WBC # BLD AUTO: 15.2 K/UL (ref 4.8–10.8)
WBC #/AREA URNS AUTO: 27 /HPF (ref 0–5)

## 2023-06-21 PROCEDURE — 87086 URINE CULTURE/COLONY COUNT: CPT

## 2023-06-21 PROCEDURE — 80053 COMPREHEN METABOLIC PANEL: CPT

## 2023-06-21 PROCEDURE — 80179 DRUG ASSAY SALICYLATE: CPT

## 2023-06-21 PROCEDURE — 81001 URINALYSIS AUTO W/SCOPE: CPT

## 2023-06-21 PROCEDURE — 36415 COLL VENOUS BLD VENIPUNCTURE: CPT

## 2023-06-21 PROCEDURE — 87186 SC STD MICRODIL/AGAR DIL: CPT

## 2023-06-21 PROCEDURE — 80306 DRUG TEST PRSMV INSTRMNT: CPT

## 2023-06-21 PROCEDURE — 82607 VITAMIN B-12: CPT

## 2023-06-21 PROCEDURE — 1240000000 HC EMOTIONAL WELLNESS R&B

## 2023-06-21 PROCEDURE — HZ90ZZZ PHARMACOTHERAPY FOR SUBSTANCE ABUSE TREATMENT, NICOTINE REPLACEMENT: ICD-10-PCS | Performed by: PSYCHIATRY & NEUROLOGY

## 2023-06-21 PROCEDURE — 82077 ASSAY SPEC XCP UR&BREATH IA: CPT

## 2023-06-21 PROCEDURE — 99285 EMERGENCY DEPT VISIT HI MDM: CPT

## 2023-06-21 PROCEDURE — 6370000000 HC RX 637 (ALT 250 FOR IP): Performed by: PSYCHIATRY & NEUROLOGY

## 2023-06-21 PROCEDURE — 82306 VITAMIN D 25 HYDROXY: CPT

## 2023-06-21 PROCEDURE — 84443 ASSAY THYROID STIM HORMONE: CPT

## 2023-06-21 PROCEDURE — 85025 COMPLETE CBC W/AUTO DIFF WBC: CPT

## 2023-06-21 PROCEDURE — 80143 DRUG ASSAY ACETAMINOPHEN: CPT

## 2023-06-21 RX ORDER — NICOTINE 21 MG/24HR
1 PATCH, TRANSDERMAL 24 HOURS TRANSDERMAL DAILY
Status: DISCONTINUED | OUTPATIENT
Start: 2023-06-22 | End: 2023-06-21 | Stop reason: SDUPTHER

## 2023-06-21 RX ORDER — TRAZODONE HYDROCHLORIDE 50 MG/1
50 TABLET ORAL NIGHTLY
Status: DISCONTINUED | OUTPATIENT
Start: 2023-06-21 | End: 2023-06-26 | Stop reason: HOSPADM

## 2023-06-21 RX ORDER — HYDROXYZINE HYDROCHLORIDE 25 MG/1
25 TABLET, FILM COATED ORAL 3 TIMES DAILY PRN
Status: DISCONTINUED | OUTPATIENT
Start: 2023-06-21 | End: 2023-06-26 | Stop reason: HOSPADM

## 2023-06-21 RX ORDER — ACETAMINOPHEN 325 MG/1
650 TABLET ORAL EVERY 4 HOURS PRN
Status: DISCONTINUED | OUTPATIENT
Start: 2023-06-21 | End: 2023-06-26 | Stop reason: HOSPADM

## 2023-06-21 RX ORDER — MECOBALAMIN 5000 MCG
5 TABLET,DISINTEGRATING ORAL NIGHTLY
Status: DISCONTINUED | OUTPATIENT
Start: 2023-06-21 | End: 2023-06-26 | Stop reason: HOSPADM

## 2023-06-21 RX ORDER — POLYETHYLENE GLYCOL 3350 17 G/17G
17 POWDER, FOR SOLUTION ORAL DAILY PRN
Status: DISCONTINUED | OUTPATIENT
Start: 2023-06-21 | End: 2023-06-26 | Stop reason: HOSPADM

## 2023-06-21 RX ORDER — NICOTINE 21 MG/24HR
1 PATCH, TRANSDERMAL 24 HOURS TRANSDERMAL DAILY
Status: DISCONTINUED | OUTPATIENT
Start: 2023-06-21 | End: 2023-06-26 | Stop reason: HOSPADM

## 2023-06-21 RX ADMIN — TRAZODONE HYDROCHLORIDE 50 MG: 50 TABLET ORAL at 20:29

## 2023-06-21 RX ADMIN — Medication 5 MG: at 20:29

## 2023-06-21 RX ADMIN — ACETAMINOPHEN 650 MG: 325 TABLET ORAL at 20:29

## 2023-06-21 RX ADMIN — HYDROXYZINE HYDROCHLORIDE 25 MG: 25 TABLET, FILM COATED ORAL at 20:29

## 2023-06-21 SDOH — ECONOMIC STABILITY: FOOD INSECURITY: WITHIN THE PAST 12 MONTHS, THE FOOD YOU BOUGHT JUST DIDN'T LAST AND YOU DIDN'T HAVE MONEY TO GET MORE.: OFTEN TRUE

## 2023-06-21 SDOH — ECONOMIC STABILITY: HOUSING INSECURITY
IN THE LAST 12 MONTHS, WAS THERE A TIME WHEN YOU DID NOT HAVE A STEADY PLACE TO SLEEP OR SLEPT IN A SHELTER (INCLUDING NOW)?: YES

## 2023-06-21 SDOH — ECONOMIC STABILITY: INCOME INSECURITY: IN THE LAST 12 MONTHS, WAS THERE A TIME WHEN YOU WERE NOT ABLE TO PAY THE MORTGAGE OR RENT ON TIME?: YES

## 2023-06-21 SDOH — ECONOMIC STABILITY: FOOD INSECURITY: WITHIN THE PAST 12 MONTHS, YOU WORRIED THAT YOUR FOOD WOULD RUN OUT BEFORE YOU GOT MONEY TO BUY MORE.: OFTEN TRUE

## 2023-06-21 SDOH — HEALTH STABILITY: PHYSICAL HEALTH: ON AVERAGE, HOW MANY DAYS PER WEEK DO YOU ENGAGE IN MODERATE TO STRENUOUS EXERCISE (LIKE A BRISK WALK)?: 7 DAYS

## 2023-06-21 SDOH — ECONOMIC STABILITY: TRANSPORTATION INSECURITY
IN THE PAST 12 MONTHS, HAS LACK OF TRANSPORTATION KEPT YOU FROM MEETINGS, WORK, OR FROM GETTING THINGS NEEDED FOR DAILY LIVING?: YES

## 2023-06-21 SDOH — HEALTH STABILITY: PHYSICAL HEALTH: ON AVERAGE, HOW MANY MINUTES DO YOU ENGAGE IN EXERCISE AT THIS LEVEL?: 80 MIN

## 2023-06-21 SDOH — ECONOMIC STABILITY: TRANSPORTATION INSECURITY
IN THE PAST 12 MONTHS, HAS THE LACK OF TRANSPORTATION KEPT YOU FROM MEDICAL APPOINTMENTS OR FROM GETTING MEDICATIONS?: YES

## 2023-06-21 ASSESSMENT — LIFESTYLE VARIABLES
HOW MANY STANDARD DRINKS CONTAINING ALCOHOL DO YOU HAVE ON A TYPICAL DAY: 5 OR 6
HOW OFTEN DO YOU HAVE A DRINK CONTAINING ALCOHOL: 4 OR MORE TIMES A WEEK

## 2023-06-21 ASSESSMENT — ENCOUNTER SYMPTOMS
COUGH: 0
EYE DISCHARGE: 0
EYE PAIN: 0
NAUSEA: 0
PHOTOPHOBIA: 0
SHORTNESS OF BREATH: 0
ABDOMINAL PAIN: 0
SORE THROAT: 0
APNEA: 0
RHINORRHEA: 0
BACK PAIN: 0
COLOR CHANGE: 0
ABDOMINAL DISTENTION: 0

## 2023-06-21 ASSESSMENT — PAIN - FUNCTIONAL ASSESSMENT
PAIN_FUNCTIONAL_ASSESSMENT: NONE - DENIES PAIN
PAIN_FUNCTIONAL_ASSESSMENT: ACTIVITIES ARE NOT PREVENTED

## 2023-06-21 ASSESSMENT — SOCIAL DETERMINANTS OF HEALTH (SDOH)
IN A TYPICAL WEEK, HOW MANY TIMES DO YOU TALK ON THE PHONE WITH FAMILY, FRIENDS, OR NEIGHBORS?: THREE TIMES A WEEK
HOW OFTEN DO YOU GET TOGETHER WITH FRIENDS OR RELATIVES?: THREE TIMES A WEEK
HOW OFTEN DO YOU ATTENT MEETINGS OF THE CLUB OR ORGANIZATION YOU BELONG TO?: NEVER
WITHIN THE LAST YEAR, HAVE YOU BEEN KICKED, HIT, SLAPPED, OR OTHERWISE PHYSICALLY HURT BY YOUR PARTNER OR EX-PARTNER?: NO
ARE YOU MARRIED, WIDOWED, DIVORCED, SEPARATED, NEVER MARRIED, OR LIVING WITH A PARTNER?: LIVING WITH PARTNER
WITHIN THE LAST YEAR, HAVE YOU BEEN HUMILIATED OR EMOTIONALLY ABUSED IN OTHER WAYS BY YOUR PARTNER OR EX-PARTNER?: NO
DO YOU BELONG TO ANY CLUBS OR ORGANIZATIONS SUCH AS CHURCH GROUPS UNIONS, FRATERNAL OR ATHLETIC GROUPS, OR SCHOOL GROUPS?: NO
HOW HARD IS IT FOR YOU TO PAY FOR THE VERY BASICS LIKE FOOD, HOUSING, MEDICAL CARE, AND HEATING?: HARD
WITHIN THE LAST YEAR, HAVE TO BEEN RAPED OR FORCED TO HAVE ANY KIND OF SEXUAL ACTIVITY BY YOUR PARTNER OR EX-PARTNER?: NO
HOW OFTEN DO YOU ATTEND CHURCH OR RELIGIOUS SERVICES?: NEVER
WITHIN THE LAST YEAR, HAVE YOU BEEN AFRAID OF YOUR PARTNER OR EX-PARTNER?: NO

## 2023-06-21 ASSESSMENT — SLEEP AND FATIGUE QUESTIONNAIRES
DO YOU HAVE DIFFICULTY SLEEPING: YES
SLEEP PATTERN: DIFFICULTY FALLING ASLEEP;INSOMNIA
DO YOU USE A SLEEP AID: NO

## 2023-06-21 ASSESSMENT — PAIN DESCRIPTION - ORIENTATION: ORIENTATION: RIGHT;LEFT

## 2023-06-21 ASSESSMENT — PAIN DESCRIPTION - LOCATION: LOCATION: LEG

## 2023-06-21 ASSESSMENT — PATIENT HEALTH QUESTIONNAIRE - PHQ9: SUM OF ALL RESPONSES TO PHQ QUESTIONS 1-9: 23

## 2023-06-21 ASSESSMENT — PAIN SCALES - GENERAL
PAINLEVEL_OUTOF10: 6
PAINLEVEL_OUTOF10: 0

## 2023-06-21 ASSESSMENT — PAIN DESCRIPTION - DESCRIPTORS: DESCRIPTORS: ACHING

## 2023-06-22 PROBLEM — F31.60 BIPOLAR AFFECTIVE DISORDER, CURRENT EPISODE MIXED, WITHOUT PSYCHOTIC FEATURES (HCC): Status: ACTIVE | Noted: 2023-06-22

## 2023-06-22 PROCEDURE — 99223 1ST HOSP IP/OBS HIGH 75: CPT | Performed by: PSYCHIATRY & NEUROLOGY

## 2023-06-22 PROCEDURE — 6370000000 HC RX 637 (ALT 250 FOR IP): Performed by: PSYCHIATRY & NEUROLOGY

## 2023-06-22 PROCEDURE — 1240000000 HC EMOTIONAL WELLNESS R&B

## 2023-06-22 RX ORDER — HALOPERIDOL 5 MG/ML
5 INJECTION INTRAMUSCULAR EVERY 6 HOURS PRN
Status: DISCONTINUED | OUTPATIENT
Start: 2023-06-22 | End: 2023-06-26 | Stop reason: HOSPADM

## 2023-06-22 RX ORDER — SULFAMETHOXAZOLE AND TRIMETHOPRIM 800; 160 MG/1; MG/1
1 TABLET ORAL EVERY 12 HOURS SCHEDULED
Status: DISCONTINUED | OUTPATIENT
Start: 2023-06-22 | End: 2023-06-23

## 2023-06-22 RX ORDER — LORAZEPAM 2 MG/1
2 TABLET ORAL EVERY 6 HOURS PRN
Status: DISCONTINUED | OUTPATIENT
Start: 2023-06-22 | End: 2023-06-26 | Stop reason: HOSPADM

## 2023-06-22 RX ORDER — OLANZAPINE 10 MG/1
10 TABLET ORAL NIGHTLY
Status: DISCONTINUED | OUTPATIENT
Start: 2023-06-22 | End: 2023-06-26 | Stop reason: HOSPADM

## 2023-06-22 RX ADMIN — OLANZAPINE 10 MG: 10 TABLET, FILM COATED ORAL at 21:14

## 2023-06-22 RX ADMIN — SULFAMETHOXAZOLE AND TRIMETHOPRIM 1 TABLET: 800; 160 TABLET ORAL at 21:14

## 2023-06-22 RX ADMIN — Medication 5 MG: at 21:14

## 2023-06-22 RX ADMIN — DIVALPROEX SODIUM 750 MG: 500 TABLET, FILM COATED, EXTENDED RELEASE ORAL at 13:43

## 2023-06-22 RX ADMIN — ACETAMINOPHEN 650 MG: 325 TABLET ORAL at 13:35

## 2023-06-22 RX ADMIN — HYDROXYZINE HYDROCHLORIDE 25 MG: 25 TABLET, FILM COATED ORAL at 21:14

## 2023-06-22 RX ADMIN — TRAZODONE HYDROCHLORIDE 50 MG: 50 TABLET ORAL at 21:14

## 2023-06-22 ASSESSMENT — PAIN DESCRIPTION - FREQUENCY: FREQUENCY: CONTINUOUS

## 2023-06-22 ASSESSMENT — PAIN DESCRIPTION - ONSET: ONSET: GRADUAL

## 2023-06-22 ASSESSMENT — PAIN - FUNCTIONAL ASSESSMENT: PAIN_FUNCTIONAL_ASSESSMENT: ACTIVITIES ARE NOT PREVENTED

## 2023-06-22 ASSESSMENT — PAIN DESCRIPTION - PAIN TYPE: TYPE: ACUTE PAIN

## 2023-06-22 ASSESSMENT — PAIN DESCRIPTION - LOCATION: LOCATION: HEAD

## 2023-06-23 LAB
25(OH)D3 SERPL-MCNC: 30.6 NG/ML
BACTERIA UR CULT: ABNORMAL
BACTERIA UR CULT: ABNORMAL
HCG UR QL: NEGATIVE
ORGANISM: ABNORMAL
TSH SERPL DL<=0.005 MIU/L-ACNC: 1.53 UIU/ML (ref 0.35–5.5)
VIT B12 SERPL-MCNC: 537 PG/ML (ref 211–946)

## 2023-06-23 PROCEDURE — 99232 SBSQ HOSP IP/OBS MODERATE 35: CPT | Performed by: PSYCHIATRY & NEUROLOGY

## 2023-06-23 PROCEDURE — 1240000000 HC EMOTIONAL WELLNESS R&B

## 2023-06-23 PROCEDURE — 84703 CHORIONIC GONADOTROPIN ASSAY: CPT

## 2023-06-23 PROCEDURE — 6370000000 HC RX 637 (ALT 250 FOR IP): Performed by: PSYCHIATRY & NEUROLOGY

## 2023-06-23 PROCEDURE — 6370000000 HC RX 637 (ALT 250 FOR IP): Performed by: FAMILY MEDICINE

## 2023-06-23 RX ORDER — CIPROFLOXACIN 500 MG/1
500 TABLET, FILM COATED ORAL EVERY 12 HOURS SCHEDULED
Status: COMPLETED | OUTPATIENT
Start: 2023-06-23 | End: 2023-06-26

## 2023-06-23 RX ADMIN — Medication 5 MG: at 22:24

## 2023-06-23 RX ADMIN — DIVALPROEX SODIUM 750 MG: 500 TABLET, FILM COATED, EXTENDED RELEASE ORAL at 08:44

## 2023-06-23 RX ADMIN — OLANZAPINE 10 MG: 10 TABLET, FILM COATED ORAL at 22:24

## 2023-06-23 RX ADMIN — TRAZODONE HYDROCHLORIDE 50 MG: 50 TABLET ORAL at 22:24

## 2023-06-23 RX ADMIN — ACETAMINOPHEN 650 MG: 325 TABLET ORAL at 22:29

## 2023-06-23 RX ADMIN — CIPROFLOXACIN 500 MG: 500 TABLET, FILM COATED ORAL at 22:24

## 2023-06-23 ASSESSMENT — PATIENT HEALTH QUESTIONNAIRE - PHQ9
7. TROUBLE CONCENTRATING ON THINGS, SUCH AS READING THE NEWSPAPER OR WATCHING TELEVISION: 3
SUM OF ALL RESPONSES TO PHQ QUESTIONS 1-9: 23
1. LITTLE INTEREST OR PLEASURE IN DOING THINGS: 1
8. MOVING OR SPEAKING SO SLOWLY THAT OTHER PEOPLE COULD HAVE NOTICED. OR THE OPPOSITE, BEING SO FIGETY OR RESTLESS THAT YOU HAVE BEEN MOVING AROUND A LOT MORE THAN USUAL: MORE THAN HALF THE DAYS
6. FEELING BAD ABOUT YOURSELF - OR THAT YOU ARE A FAILURE OR HAVE LET YOURSELF OR YOUR FAMILY DOWN: 3
4. FEELING TIRED OR HAVING LITTLE ENERGY: MORE THAN HALF THE DAYS
3. TROUBLE FALLING OR STAYING ASLEEP: 3
9. THOUGHTS THAT YOU WOULD BE BETTER OFF DEAD, OR OF HURTING YOURSELF: 2
9. THOUGHTS THAT YOU WOULD BE BETTER OFF DEAD, OR OF HURTING YOURSELF: SEVERAL DAYS
5. POOR APPETITE OR OVEREATING: MORE THAN HALF THE DAYS
6. FEELING BAD ABOUT YOURSELF - OR THAT YOU ARE A FAILURE OR HAVE LET YOURSELF OR YOUR FAMILY DOWN: MORE THAN HALF THE DAYS
4. FEELING TIRED OR HAVING LITTLE ENERGY: MORE THAN HALF THE DAYS
SUM OF ALL RESPONSES TO PHQ QUESTIONS 1-9: 23
6. FEELING BAD ABOUT YOURSELF - OR THAT YOU ARE A FAILURE OR HAVE LET YOURSELF OR YOUR FAMILY DOWN: MORE THAN HALF THE DAYS
SUM OF ALL RESPONSES TO PHQ9 QUESTIONS 1 & 2: 3
10. IF YOU CHECKED OFF ANY PROBLEMS, HOW DIFFICULT HAVE THESE PROBLEMS MADE IT FOR YOU TO DO YOUR WORK, TAKE CARE OF THINGS AT HOME, OR GET ALONG WITH OTHER PEOPLE: VERY DIFFICULT
5. POOR APPETITE OR OVEREATING: MORE THAN HALF THE DAYS
1. LITTLE INTEREST OR PLEASURE IN DOING THINGS: SEVERAL DAYS
9. THOUGHTS THAT YOU WOULD BE BETTER OFF DEAD, OR OF HURTING YOURSELF: SEVERAL DAYS
7. TROUBLE CONCENTRATING ON THINGS, SUCH AS READING THE NEWSPAPER OR WATCHING TELEVISION: MORE THAN HALF THE DAYS
SUM OF ALL RESPONSES TO PHQ9 QUESTIONS 1 & 2: 3
SUM OF ALL RESPONSES TO PHQ9 QUESTIONS 1 & 2: 3
8. MOVING OR SPEAKING SO SLOWLY THAT OTHER PEOPLE COULD HAVE NOTICED. OR THE OPPOSITE, BEING SO FIGETY OR RESTLESS THAT YOU HAVE BEEN MOVING AROUND A LOT MORE THAN USUAL: 3
2. FEELING DOWN, DEPRESSED OR HOPELESS: 2
7. TROUBLE CONCENTRATING ON THINGS, SUCH AS READING THE NEWSPAPER OR WATCHING TELEVISION: MORE THAN HALF THE DAYS
4. FEELING TIRED OR HAVING LITTLE ENERGY: 3
SUM OF ALL RESPONSES TO PHQ QUESTIONS 1-9: 16
8. MOVING OR SPEAKING SO SLOWLY THAT OTHER PEOPLE COULD HAVE NOTICED. OR THE OPPOSITE, BEING SO FIGETY OR RESTLESS THAT YOU HAVE BEEN MOVING AROUND A LOT MORE THAN USUAL: MORE THAN HALF THE DAYS
3. TROUBLE FALLING OR STAYING ASLEEP: MORE THAN HALF THE DAYS
SUM OF ALL RESPONSES TO PHQ QUESTIONS 1-9: 21
2. FEELING DOWN, DEPRESSED OR HOPELESS: MORE THAN HALF THE DAYS
SUM OF ALL RESPONSES TO PHQ QUESTIONS 1-9: 23
10. IF YOU CHECKED OFF ANY PROBLEMS, HOW DIFFICULT HAVE THESE PROBLEMS MADE IT FOR YOU TO DO YOUR WORK, TAKE CARE OF THINGS AT HOME, OR GET ALONG WITH OTHER PEOPLE: VERY DIFFICULT
3. TROUBLE FALLING OR STAYING ASLEEP: MORE THAN HALF THE DAYS
SUM OF ALL RESPONSES TO PHQ QUESTIONS 1-9: 16
5. POOR APPETITE OR OVEREATING: 3
1. LITTLE INTEREST OR PLEASURE IN DOING THINGS: SEVERAL DAYS
2. FEELING DOWN, DEPRESSED OR HOPELESS: MORE THAN HALF THE DAYS

## 2023-06-23 ASSESSMENT — PAIN SCALES - GENERAL
PAINLEVEL_OUTOF10: 5
PAINLEVEL_OUTOF10: 0
PAINLEVEL_OUTOF10: 0

## 2023-06-23 ASSESSMENT — PAIN DESCRIPTION - LOCATION: LOCATION: OTHER (COMMENT)

## 2023-06-23 ASSESSMENT — PAIN DESCRIPTION - DESCRIPTORS: DESCRIPTORS: ACHING

## 2023-06-23 ASSESSMENT — PAIN DESCRIPTION - ORIENTATION: ORIENTATION: OTHER (COMMENT)

## 2023-06-23 ASSESSMENT — PAIN - FUNCTIONAL ASSESSMENT: PAIN_FUNCTIONAL_ASSESSMENT: PREVENTS OR INTERFERES SOME ACTIVE ACTIVITIES AND ADLS

## 2023-06-24 PROCEDURE — 6370000000 HC RX 637 (ALT 250 FOR IP): Performed by: PSYCHIATRY & NEUROLOGY

## 2023-06-24 PROCEDURE — 6370000000 HC RX 637 (ALT 250 FOR IP): Performed by: FAMILY MEDICINE

## 2023-06-24 PROCEDURE — 1240000000 HC EMOTIONAL WELLNESS R&B

## 2023-06-24 RX ADMIN — ACETAMINOPHEN 650 MG: 325 TABLET ORAL at 21:44

## 2023-06-24 RX ADMIN — CIPROFLOXACIN 500 MG: 500 TABLET, FILM COATED ORAL at 08:26

## 2023-06-24 RX ADMIN — DIVALPROEX SODIUM 750 MG: 500 TABLET, FILM COATED, EXTENDED RELEASE ORAL at 08:26

## 2023-06-24 RX ADMIN — TRAZODONE HYDROCHLORIDE 50 MG: 50 TABLET ORAL at 21:01

## 2023-06-24 RX ADMIN — CIPROFLOXACIN 500 MG: 500 TABLET, FILM COATED ORAL at 21:01

## 2023-06-24 RX ADMIN — Medication 5 MG: at 21:01

## 2023-06-24 RX ADMIN — HYDROXYZINE HYDROCHLORIDE 25 MG: 25 TABLET, FILM COATED ORAL at 21:01

## 2023-06-24 RX ADMIN — OLANZAPINE 10 MG: 10 TABLET, FILM COATED ORAL at 21:01

## 2023-06-24 ASSESSMENT — PAIN DESCRIPTION - DESCRIPTORS: DESCRIPTORS: ACHING

## 2023-06-24 ASSESSMENT — PAIN DESCRIPTION - ORIENTATION: ORIENTATION: RIGHT;LEFT

## 2023-06-24 ASSESSMENT — PAIN DESCRIPTION - LOCATION: LOCATION: LEG;OTHER (COMMENT)

## 2023-06-24 ASSESSMENT — PAIN SCALES - GENERAL
PAINLEVEL_OUTOF10: 0
PAINLEVEL_OUTOF10: 6
PAINLEVEL_OUTOF10: 0

## 2023-06-24 ASSESSMENT — PAIN - FUNCTIONAL ASSESSMENT: PAIN_FUNCTIONAL_ASSESSMENT: PREVENTS OR INTERFERES SOME ACTIVE ACTIVITIES AND ADLS

## 2023-06-25 PROCEDURE — 6360000002 HC RX W HCPCS: Performed by: PSYCHIATRY & NEUROLOGY

## 2023-06-25 PROCEDURE — 6370000000 HC RX 637 (ALT 250 FOR IP): Performed by: PSYCHIATRY & NEUROLOGY

## 2023-06-25 PROCEDURE — 6370000000 HC RX 637 (ALT 250 FOR IP): Performed by: FAMILY MEDICINE

## 2023-06-25 PROCEDURE — 99231 SBSQ HOSP IP/OBS SF/LOW 25: CPT | Performed by: PSYCHIATRY & NEUROLOGY

## 2023-06-25 PROCEDURE — 1240000000 HC EMOTIONAL WELLNESS R&B

## 2023-06-25 PROCEDURE — 6370000000 HC RX 637 (ALT 250 FOR IP): Performed by: PHYSICIAN ASSISTANT

## 2023-06-25 RX ORDER — LORAZEPAM 2 MG/ML
2 INJECTION INTRAMUSCULAR EVERY 6 HOURS PRN
Status: DISCONTINUED | OUTPATIENT
Start: 2023-06-25 | End: 2023-06-26

## 2023-06-25 RX ADMIN — OLANZAPINE 10 MG: 10 TABLET, FILM COATED ORAL at 21:42

## 2023-06-25 RX ADMIN — LORAZEPAM 2 MG: 2 INJECTION INTRAMUSCULAR; INTRAVENOUS at 15:51

## 2023-06-25 RX ADMIN — TRAZODONE HYDROCHLORIDE 50 MG: 50 TABLET ORAL at 21:42

## 2023-06-25 RX ADMIN — CIPROFLOXACIN 500 MG: 500 TABLET, FILM COATED ORAL at 21:42

## 2023-06-25 RX ADMIN — HALOPERIDOL LACTATE 5 MG: 5 INJECTION, SOLUTION INTRAMUSCULAR at 15:50

## 2023-06-25 RX ADMIN — DIVALPROEX SODIUM 750 MG: 500 TABLET, FILM COATED, EXTENDED RELEASE ORAL at 07:57

## 2023-06-25 RX ADMIN — Medication 5 MG: at 21:43

## 2023-06-25 RX ADMIN — CIPROFLOXACIN 500 MG: 500 TABLET, FILM COATED ORAL at 07:57

## 2023-06-26 VITALS
OXYGEN SATURATION: 97 % | BODY MASS INDEX: 16.99 KG/M2 | TEMPERATURE: 96.8 F | WEIGHT: 90 LBS | SYSTOLIC BLOOD PRESSURE: 103 MMHG | DIASTOLIC BLOOD PRESSURE: 62 MMHG | HEART RATE: 82 BPM | RESPIRATION RATE: 16 BRPM | HEIGHT: 61 IN

## 2023-06-26 PROCEDURE — 6370000000 HC RX 637 (ALT 250 FOR IP): Performed by: PSYCHIATRY & NEUROLOGY

## 2023-06-26 PROCEDURE — 99239 HOSP IP/OBS DSCHRG MGMT >30: CPT | Performed by: PSYCHIATRY & NEUROLOGY

## 2023-06-26 PROCEDURE — 5130000000 HC BRIDGE APPOINTMENT

## 2023-06-26 PROCEDURE — 6370000000 HC RX 637 (ALT 250 FOR IP): Performed by: FAMILY MEDICINE

## 2023-06-26 PROCEDURE — 6370000000 HC RX 637 (ALT 250 FOR IP): Performed by: PHYSICIAN ASSISTANT

## 2023-06-26 RX ORDER — TRAZODONE HYDROCHLORIDE 50 MG/1
50 TABLET ORAL NIGHTLY
Qty: 30 TABLET | Refills: 0 | Status: SHIPPED | OUTPATIENT
Start: 2023-06-26

## 2023-06-26 RX ORDER — DIVALPROEX SODIUM 500 MG/1
500 TABLET, EXTENDED RELEASE ORAL DAILY
Qty: 30 TABLET | Refills: 0 | Status: SHIPPED | OUTPATIENT
Start: 2023-06-27

## 2023-06-26 RX ORDER — HYDROXYZINE HYDROCHLORIDE 25 MG/1
25 TABLET, FILM COATED ORAL 3 TIMES DAILY PRN
Qty: 90 TABLET | Refills: 0 | Status: SHIPPED | OUTPATIENT
Start: 2023-06-26

## 2023-06-26 RX ORDER — DIVALPROEX SODIUM 250 MG/1
250 TABLET, EXTENDED RELEASE ORAL DAILY
Qty: 30 TABLET | Refills: 0 | Status: SHIPPED | OUTPATIENT
Start: 2023-06-26

## 2023-06-26 RX ORDER — MECOBALAMIN 5000 MCG
5 TABLET,DISINTEGRATING ORAL NIGHTLY
Qty: 30 TABLET | Refills: 0 | Status: SHIPPED | OUTPATIENT
Start: 2023-06-26

## 2023-06-26 RX ORDER — OLANZAPINE 10 MG/1
10 TABLET ORAL NIGHTLY
Qty: 30 TABLET | Refills: 0 | Status: SHIPPED | OUTPATIENT
Start: 2023-06-26

## 2023-06-26 RX ADMIN — DIVALPROEX SODIUM 750 MG: 500 TABLET, FILM COATED, EXTENDED RELEASE ORAL at 08:09

## 2023-06-26 RX ADMIN — CIPROFLOXACIN 500 MG: 500 TABLET, FILM COATED ORAL at 08:09

## 2023-08-21 ENCOUNTER — HOSPITAL ENCOUNTER (EMERGENCY)
Facility: HOSPITAL | Age: 33
Discharge: OTHER INSTITUTION | End: 2023-08-22
Attending: EMERGENCY MEDICINE
Payer: MEDICAID

## 2023-08-21 ENCOUNTER — HOSPITAL ENCOUNTER (EMERGENCY)
Facility: HOSPITAL | Age: 33
Discharge: HOME | End: 2023-08-21
Attending: EMERGENCY MEDICINE | Admitting: EMERGENCY MEDICINE
Payer: MEDICAID

## 2023-08-21 VITALS
OXYGEN SATURATION: 100 % | RESPIRATION RATE: 18 BRPM | TEMPERATURE: 97.8 F | BODY MASS INDEX: 17.58 KG/M2 | HEIGHT: 60 IN | SYSTOLIC BLOOD PRESSURE: 118 MMHG | DIASTOLIC BLOOD PRESSURE: 79 MMHG | HEART RATE: 73 BPM

## 2023-08-21 DIAGNOSIS — F32.A DEPRESSION WITH SUICIDAL IDEATION: Primary | ICD-10-CM

## 2023-08-21 DIAGNOSIS — R45.851 PASSIVE SUICIDAL IDEATIONS: Primary | ICD-10-CM

## 2023-08-21 DIAGNOSIS — R45.851 DEPRESSION WITH SUICIDAL IDEATION: Primary | ICD-10-CM

## 2023-08-21 DIAGNOSIS — F19.10 SUBSTANCE ABUSE: ICD-10-CM

## 2023-08-21 DIAGNOSIS — F41.1 GENERALIZED ANXIETY DISORDER: ICD-10-CM

## 2023-08-21 LAB
ALBUMIN SERPL-MCNC: 4.5 G/DL (ref 3.5–5.2)
ALBUMIN/GLOB SERPL: 1.5 G/DL
ALP SERPL-CCNC: 60 U/L (ref 39–117)
ALT SERPL W P-5'-P-CCNC: 15 U/L (ref 1–33)
AMPHET+METHAMPHET UR QL: NEGATIVE
ANION GAP SERPL CALCULATED.3IONS-SCNC: 8.4 MMOL/L (ref 5–15)
APAP SERPL-MCNC: <5 MCG/ML (ref 0–30)
AST SERPL-CCNC: 20 U/L (ref 1–32)
BARBITURATES UR QL SCN: NEGATIVE
BASOPHILS # BLD AUTO: 0.04 10*3/MM3 (ref 0–0.2)
BASOPHILS NFR BLD AUTO: 0.5 % (ref 0–1.5)
BENZODIAZ UR QL SCN: NEGATIVE
BILIRUB SERPL-MCNC: 0.2 MG/DL (ref 0–1.2)
BUN SERPL-MCNC: 11 MG/DL (ref 6–20)
BUN/CREAT SERPL: 17.2 (ref 7–25)
CALCIUM SPEC-SCNC: 9.7 MG/DL (ref 8.6–10.5)
CANNABINOIDS SERPL QL: POSITIVE
CHLORIDE SERPL-SCNC: 99 MMOL/L (ref 98–107)
CO2 SERPL-SCNC: 28.6 MMOL/L (ref 22–29)
COCAINE UR QL: NEGATIVE
CREAT SERPL-MCNC: 0.64 MG/DL (ref 0.57–1)
DEPRECATED RDW RBC AUTO: 49.5 FL (ref 37–54)
EGFRCR SERPLBLD CKD-EPI 2021: 122.1 ML/MIN/1.73
EOSINOPHIL # BLD AUTO: 0.2 10*3/MM3 (ref 0–0.4)
EOSINOPHIL NFR BLD AUTO: 2.5 % (ref 0.3–6.2)
ERYTHROCYTE [DISTWIDTH] IN BLOOD BY AUTOMATED COUNT: 13.1 % (ref 12.3–15.4)
ETHANOL BLD-MCNC: <10 MG/DL (ref 0–10)
ETHANOL UR QL: <0.01 %
FENTANYL UR-MCNC: NEGATIVE NG/ML
GLOBULIN UR ELPH-MCNC: 3 GM/DL
GLUCOSE BLDC GLUCOMTR-MCNC: 181 MG/DL (ref 70–99)
GLUCOSE SERPL-MCNC: 93 MG/DL (ref 65–99)
HCT VFR BLD AUTO: 44.4 % (ref 34–46.6)
HGB BLD-MCNC: 14.4 G/DL (ref 12–15.9)
HOLD SPECIMEN: NORMAL
HOLD SPECIMEN: NORMAL
IMM GRANULOCYTES # BLD AUTO: 0.02 10*3/MM3 (ref 0–0.05)
IMM GRANULOCYTES NFR BLD AUTO: 0.3 % (ref 0–0.5)
LYMPHOCYTES # BLD AUTO: 2.03 10*3/MM3 (ref 0.7–3.1)
LYMPHOCYTES NFR BLD AUTO: 25.9 % (ref 19.6–45.3)
MCH RBC QN AUTO: 32.9 PG (ref 26.6–33)
MCHC RBC AUTO-ENTMCNC: 32.4 G/DL (ref 31.5–35.7)
MCV RBC AUTO: 101.4 FL (ref 79–97)
METHADONE UR QL SCN: NEGATIVE
MONOCYTES # BLD AUTO: 0.65 10*3/MM3 (ref 0.1–0.9)
MONOCYTES NFR BLD AUTO: 8.3 % (ref 5–12)
NEUTROPHILS NFR BLD AUTO: 4.91 10*3/MM3 (ref 1.7–7)
NEUTROPHILS NFR BLD AUTO: 62.5 % (ref 42.7–76)
NRBC BLD AUTO-RTO: 0 /100 WBC (ref 0–0.2)
OPIATES UR QL: NEGATIVE
OXYCODONE UR QL SCN: NEGATIVE
PLATELET # BLD AUTO: 302 10*3/MM3 (ref 140–450)
PMV BLD AUTO: 9.5 FL (ref 6–12)
POTASSIUM SERPL-SCNC: 4.4 MMOL/L (ref 3.5–5.2)
PROT SERPL-MCNC: 7.5 G/DL (ref 6–8.5)
RBC # BLD AUTO: 4.38 10*6/MM3 (ref 3.77–5.28)
SALICYLATES SERPL-MCNC: <0.3 MG/DL
SODIUM SERPL-SCNC: 136 MMOL/L (ref 136–145)
WBC NRBC COR # BLD: 7.85 10*3/MM3 (ref 3.4–10.8)
WHOLE BLOOD HOLD COAG: NORMAL
WHOLE BLOOD HOLD SPECIMEN: NORMAL

## 2023-08-21 PROCEDURE — 85025 COMPLETE CBC W/AUTO DIFF WBC: CPT

## 2023-08-21 PROCEDURE — 80307 DRUG TEST PRSMV CHEM ANLYZR: CPT

## 2023-08-21 PROCEDURE — 99283 EMERGENCY DEPT VISIT LOW MDM: CPT

## 2023-08-21 PROCEDURE — 80179 DRUG ASSAY SALICYLATE: CPT

## 2023-08-21 PROCEDURE — 80053 COMPREHEN METABOLIC PANEL: CPT

## 2023-08-21 PROCEDURE — 80143 DRUG ASSAY ACETAMINOPHEN: CPT

## 2023-08-21 PROCEDURE — 82077 ASSAY SPEC XCP UR&BREATH IA: CPT

## 2023-08-21 PROCEDURE — 99284 EMERGENCY DEPT VISIT MOD MDM: CPT

## 2023-08-21 PROCEDURE — 82948 REAGENT STRIP/BLOOD GLUCOSE: CPT

## 2023-08-21 RX ORDER — FLUTICASONE PROPIONATE 50 MCG
2 SPRAY, SUSPENSION (ML) NASAL DAILY
COMMUNITY

## 2023-08-21 RX ORDER — NALOXONE HYDROCHLORIDE 4 MG/.1ML
1 SPRAY NASAL AS NEEDED
COMMUNITY

## 2023-08-21 RX ORDER — LORAZEPAM 0.5 MG/1
1 TABLET ORAL ONCE
Status: COMPLETED | OUTPATIENT
Start: 2023-08-21 | End: 2023-08-21

## 2023-08-21 RX ORDER — OLANZAPINE 5 MG/1
5 TABLET ORAL NIGHTLY
COMMUNITY

## 2023-08-21 RX ORDER — MULTIPLE VITAMINS W/ MINERALS TAB 9MG-400MCG
1 TAB ORAL DAILY
COMMUNITY

## 2023-08-21 RX ORDER — SODIUM CHLORIDE 0.9 % (FLUSH) 0.9 %
10 SYRINGE (ML) INJECTION AS NEEDED
Status: DISCONTINUED | OUTPATIENT
Start: 2023-08-21 | End: 2023-08-21 | Stop reason: HOSPADM

## 2023-08-21 RX ORDER — CYCLOBENZAPRINE HCL 5 MG
5 TABLET ORAL 3 TIMES DAILY PRN
COMMUNITY

## 2023-08-21 RX ORDER — TRAZODONE HYDROCHLORIDE 50 MG/1
50 TABLET ORAL NIGHTLY
COMMUNITY

## 2023-08-21 RX ORDER — FLUOXETINE 10 MG/1
10 CAPSULE ORAL DAILY
COMMUNITY

## 2023-08-21 RX ORDER — HYDROXYZINE PAMOATE 25 MG/1
25 CAPSULE ORAL 3 TIMES DAILY PRN
COMMUNITY

## 2023-08-21 RX ORDER — IBUPROFEN 400 MG/1
400 TABLET ORAL EVERY 8 HOURS PRN
COMMUNITY

## 2023-08-21 RX ADMIN — LORAZEPAM 1 MG: 0.5 TABLET ORAL at 17:14

## 2023-08-21 NOTE — Clinical Note
[facility] EMERGENCY ROOM  [address]  [place] [address]  Phone: [phone]    Sonia Mishra was seen and treated in our emergency department on 8/21/2023.  She may return to work on 08/23/2023.  Recommend day of rest today and tomorrow, before heading back to group sessions.       Thank you for choosing [facility].    Randell Dennis MD

## 2023-08-21 NOTE — SIGNIFICANT NOTE
08/21/23 1657   Plan   Final Note IRVING spoke with Delano at [facility], pt can return to [facility] this date. Pt does have an appt with psychiatry at [facility] on 8/23/23. IRVING will contact [facility] once pt is ready for discharge. IRVING updated provider.

## 2023-08-21 NOTE — ED TRIAGE NOTES
"[facility] to [facility] from [facility]. Patient states she was seen here today. Patient states she is not suicidal right now but \"kind of am\". Patient states she is coming off of alcohol, meth, spice, and marijuana. Patient is having meltdowns. Patient wants to go to [facility]. Patient states she is very depressed.   "

## 2023-08-21 NOTE — ED PROVIDER NOTES
"Time: 3:27 PM EDT  Date of encounter:  8/21/2023  Independent Historian/Clinical History and Information was obtained by:   Patient    History is limited by: N/A    Chief Complaint   Patient presents with    Suicidal         History of Present Illness:  Patient is a 32 y.o. year old female who presents to the emergency department for evaluation of bizarre thoughts and anxiety.  Patient states that earlier prior to arrival she was suicidal but symptoms have now resolved.  States that she has been out of her anxiety medication.  Patient arrived at [facility] 6 days ago and is recovering from drugs and alcohol.     HPI    Patient Care Team  Primary Care Provider: Provider, No Known    Past Medical History:     Allergies   Allergen Reactions    Amoxicillin Rash    Penicillins Rash     Past Medical History:   Diagnosis Date    Alcohol abuse     Anxiety     Autism spectrum disorder     Hearing loss     Learning disability     Substance abuse      History reviewed. No pertinent surgical history.  History reviewed. No pertinent family history.    Home Medications:  Prior to Admission medications    Not on File        Social History:   Social History     Tobacco Use    Smoking status: Every Day     Packs/day: 1.00     Years: 3.00     Pack years: 3.00     Types: Cigarettes    Smokeless tobacco: Never   Vaping Use    Vaping Use: Every day    Substances: Nicotine, THC, CBD, Flavoring   Substance Use Topics    Alcohol use: Yes     Comment: 1 pint every other day    Drug use: Yes     Types: Amphetamines, Marijuana, Benzodiazepines, Methamphetamines         Review of Systems:  Review of Systems   Psychiatric/Behavioral:  Positive for suicidal ideas. The patient is nervous/anxious.       Physical Exam:  BP 97/74 (BP Location: Left arm, Patient Position: Sitting)   Pulse 73   Temp 97.8 øF (36.6 øC) (Oral)   Resp 22   Ht 152.4 cm (60\")   LMP  (LMP Unknown) Comment: patient states about 2 months ago but isn't for sure  SpO2 " 97%   BMI 17.58 kg/mý           General: Awake alert and in no obvious distress    HEENT: Head normocephalic atraumatic, eyes PERRLA EOMI, nose normal, oropharynx normal.    Neck: Supple full range of motion, no meningismus, no lymphadenopathy    Heart: Regular rate and rhythm, no murmurs or rubs, 2+ radial pulses bilaterally    Lungs: Clear to auscultation bilaterally without wheezes or crackles, no respiratory distress    Abdomen: Soft, nontender, nondistended, no rebound or guarding    Skin: Warm, dry, no rash    Musculoskeletal: Normal range of motion, no lower extremity edema    Neurologic: Oriented x3, no motor deficits no sensory deficits    Psychiatric: Mood appears somewhat bizarre and expansive at times, no active suicidal or homicidal ideations noted, no psychosis              Procedures:  Procedures      Medical Decision Making:      Comorbidities that affect care:    Mood disorder, Substance Abuse    External Notes reviewed:    None      The following orders were placed and all results were independently analyzed by me:  Orders Placed This Encounter   Procedures    [place] Draw    Comprehensive Metabolic Panel    Acetaminophen Level    Ethanol    Salicylate Level    Urine Drug Screen - Urine, Clean Catch    CBC Auto Differential    NPO Diet NPO Type: Strict NPO    Continuous Pulse Oximetry    Vital Signs    Undress & Gown    Psych / Access to See    Oxygen Therapy- Nasal Cannula; Titrate 1-6 LPM Per SpO2; 90 - 95%    POC Glucose Once    POC Glucose Once    Insert Peripheral IV    Suicide Precautions    CBC & Differential    Green Top (Gel)    Lavender Top    Gold Top - SST    Light Blue Top       Medications Given in the Emergency Department:  Medications   sodium chloride 0.9 % flush 10 mL (has no administration in time range)   LORazepam (ATIVAN) tablet 1 mg (has no administration in time range)        ED Course:           Labs:    Lab Results (last 24 hours)       Procedure Component Value Units  Date/Time    CBC & Differential [[id number]]  (Abnormal) Collected: 08/21/23 1335    Specimen: Blood Updated: 08/21/23 1347    Narrative:      The following orders were created for panel order CBC & Differential.  Procedure                               Abnormality         Status                     ---------                               -----------         ------                     CBC Auto Differential[[id number]]        Abnormal            Final result                 Please view results for these tests on the individual orders.    Comprehensive Metabolic Panel [[id number]] Collected: 08/21/23 1335    Specimen: Blood Updated: 08/21/23 1405     Glucose 93 mg/dL      BUN 11 mg/dL      Creatinine 0.64 mg/dL      Sodium 136 mmol/L      Potassium 4.4 mmol/L      Chloride 99 mmol/L      CO2 28.6 mmol/L      Calcium 9.7 mg/dL      Total Protein 7.5 g/dL      Albumin 4.5 g/dL      ALT (SGPT) 15 U/L      AST (SGOT) 20 U/L      Alkaline Phosphatase 60 U/L      Total Bilirubin 0.2 mg/dL      Globulin 3.0 gm/dL      A/G Ratio 1.5 g/dL      BUN/Creatinine Ratio 17.2     Anion Gap 8.4 mmol/L      eGFR 122.1 mL/min/1.73     Narrative:      GFR Normal >60  Chronic Kidney Disease <60  Kidney Failure <15      Acetaminophen Level [[id number]]  (Normal) Collected: 08/21/23 1335    Specimen: Blood Updated: 08/21/23 1405     Acetaminophen <5.0 mcg/mL     Ethanol [[id number]] Collected: 08/21/23 1335    Specimen: Blood Updated: 08/21/23 1405     Ethanol <10 mg/dL      Ethanol % <0.010 %     Narrative:      Ethanol (Plasma)  <10 Essentially Negative    Toxic Concentrations           mg/dL    Flushing, slowing of reflexes    Impaired visual activity         Depression of CNS              >100  Possible Coma                  >300       Salicylate Level [[id number]]  (Normal) Collected: 08/21/23 1335    Specimen: Blood Updated: 08/21/23 1405     Salicylate <0.3 mg/dL     Urine Drug Screen - Urine, Clean Catch [[id number]]   (Abnormal) Collected: 08/21/23 1335    Specimen: Urine, Clean Catch Updated: 08/21/23 1409     Amphet/Methamphet, Screen Negative     Barbiturates Screen, Urine Negative     Benzodiazepine Screen, Urine Negative     Cocaine Screen, Urine Negative     Opiate Screen Negative     THC, Screen, Urine Positive     Methadone Screen, Urine Negative     Oxycodone Screen, Urine Negative     Fentanyl, Urine Negative    Narrative:      Negative Thresholds Per Drugs Screened:    Amphetamines                 500 ng/ml  Barbiturates                 200 ng/ml  Benzodiazepines              100 ng/ml  Cocaine                      300 ng/ml  Methadone                    300 ng/ml  Opiates                      300 ng/ml  Oxycodone                    100 ng/ml  THC                           50 ng/ml  Fentanyl                       5 ng/ml      The Normal Value for all drugs tested is negative. This report includes final unconfirmed screening results to be used for medical treatment purposes only. Unconfirmed results must not be used for non-medical purposes such as employment or legal testing. Clinical consideration should be applied to any drug of abuse test, particularly when unconfirmed results are used.            CBC Auto Differential [[id number]]  (Abnormal) Collected: 08/21/23 1335    Specimen: Blood Updated: 08/21/23 1347     WBC 7.85 10*3/mm3      RBC 4.38 10*6/mm3      Hemoglobin 14.4 g/dL      Hematocrit 44.4 %      .4 fL      MCH 32.9 pg      MCHC 32.4 g/dL      RDW 13.1 %      RDW-SD 49.5 fl      MPV 9.5 fL      Platelets 302 10*3/mm3      Neutrophil % 62.5 %      Lymphocyte % 25.9 %      Monocyte % 8.3 %      Eosinophil % 2.5 %      Basophil % 0.5 %      Immature Grans % 0.3 %      Neutrophils, Absolute 4.91 10*3/mm3      Lymphocytes, Absolute 2.03 10*3/mm3      Monocytes, Absolute 0.65 10*3/mm3      Eosinophils, Absolute 0.20 10*3/mm3      Basophils, Absolute 0.04 10*3/mm3      Immature Grans, Absolute 0.02 10*3/mm3       nRBC 0.0 /100 WBC     POC Glucose Once [[id number]]  (Abnormal) Collected: 08/21/23 1345    Specimen: Blood Updated: 08/21/23 1347     Glucose 181 mg/dL      Comment: Serial Number: [id number]:  [id number]                Imaging:    No Radiology Exams Resulted Within Past 24 Hours      Differential Diagnosis and Discussion:      Psychiatric: Differential diagnosis includes but is not limited to depression, psychosis, bipolar disorder, anxiety, manic episode, schizophrenia, and substance abuse.    All labs were reviewed and interpreted by me.    MDM     Amount and/or Complexity of Data Reviewed  Clinical lab tests: reviewed               This patient is a 32-year-old female with some history of mood disorder but also currently recovering from substance abuse this past [facility] who came here for some increased stress levels and intermittent fleeting suicidal thoughts.    We did a full medical clearance work-up here only still positive for THC but she has only been at [facility] for about 6 days.    I had her evaluated for suicidal risk by our ED psychiatric clinician and she is not showing any plans to harm herself at this time and does not feel suicidal currently but just generally stressed out with group sessions at [facility].    I will give her a day of rest at the facility tomorrow and give her an oral Ativan here for her anxiety.    She looks stable to be discharged back to [facility] for follow-up.                Patient Care Considerations:          Consultants/Shared Management Plan:    This plan of management was discussed with her ED  and psychiatric clinician who evaluated her at the bedside.    Social Determinants of Health:    Patient is independent, reliable, and has access to care.       Disposition and Care Coordination:    Discharged: I considered escalation of care by admitting this patient for observation, however the  patient has improved and is suitable and  stable for discharge.    I have explained the patient's condition, diagnoses and treatment plan based on the information available to me at this time. I have answered questions and addressed any concerns. The patient has a good  understanding of the patient's diagnosis, condition, and treatment plan as can be expected at this point. The vital signs have been stable. The patient's condition is stable and appropriate for discharge from the emergency department.      The patient will pursue further outpatient evaluation with the primary care physician or other designated or consulting physician as outlined in the discharge instructions. They are agreeable to this plan of care and follow-up instructions have been explained in detail. The patient has received these instructions in written format and have expressed an understanding of the discharge instructions. The patient is aware that any significant change in condition or worsening of symptoms should prompt an immediate return to this or the closest emergency department or call to 911.  I have explained discharge medications and the need for follow up with the patient/caretakers. This was also printed in the discharge instructions. Patient was discharged with the following medications and follow up:      Medication List      No changes were made to your prescriptions during this visit.      Provider, No Known  [place] [address]    Call in 2 days  As needed, If symptoms worsen, for a follow-up appointment       Final diagnoses:   Passive suicidal ideations   Generalized anxiety disorder        ED Disposition       ED Disposition   Discharge    Condition   Stable    Comment   --               This medical record created using voice recognition software.             Randell Dennis MD  08/21/23 6964

## 2023-08-21 NOTE — Clinical Note
[facility] EMERGENCY ROOM  [address]  [place] [address]  Phone: [phone]    Sonia Mishra was seen and treated in our emergency department on 8/21/2023.  She may return to work on 08/23/2023.  Recommend day of rest today and tomorrow, before heading back to group sessions.       Thank you for choosing [facility].    Randell Dennis MD

## 2023-08-21 NOTE — SIGNIFICANT NOTE
"   08/21/23 1644   Behavior WDL   Behavior WDL interactions;motor movement;X   Interactions cooperative;suspicious   Motor Movement restless   Emotion Mood WDL   Emotion/Mood/Affect WDL affect;emotion mood;X   Affect affect consistent with mood   Emotion/Mood anxious;irritable   Speech WDL   Speech WDL WDL   Perceptual State WDL   Perceptual State WDL hallucinations;perceptual state;WDL   Hallucinations denies hallucinations   Perceptual State consistent with reality   Thought Process WDL   Thought Process WDL delusions;judgment and insight;thought content;thought process;X   Delusions no delusions   Judgment and Insight judgment appropriate to situation;insight appropriate to situation   Thought Content suicidal thoughts   Thought Process relevant   Coping/Stress   Major Change/Loss/Stressor chemical dependency/abuse;mental health condition   Patient Personal Strengths expressive of needs;expressive of emotions   Sources of Support none;other (see comments)  (Reports her mother has an EPO out on her and her boyfriend is currently incarcerated)   Techniques to [place] with Loss/Stress/Change substance use   Reaction to Health Status anger   C-SSRS (Recent)   Q1 Wished to be Dead (Past Month) no   Q2 Suicidal Thoughts (Past Month) yes   Q3 Suicidal Thought Method no   Q4 Suicidal Intent without Specific Plan no   Q5 Suicide Intent with Specific Plan no   Q6 Suicide Behavior (Lifetime) no   Within the past 3 Months? no   Level of Risk per Screen low risk   Violence Risk   Feels Like Hurting Others no   Previous Attempt to Harm Others no     SW met with pt at bedside this date. Pt reports that she is currently at [facility] in treatment. Pt does present anxious and worried when discussing current circumstances. Pt reports that she was experiencing SI when she arrived earlier this date, however, she is not currently SI. Pt reports that she has fleeting SI thoughts that come and go \"all the time\". Pt reports that she is " "only 6 days clean from spice, meth and several other substances. Pt reports that she also was drinking a \"pack a day\". Pt reports that she does feel safe returning to [facility] if they will take her back. Pt reports that she does not want to have to start all over again if she isn't back \"by midnight\". Pt reports that she needs sleep; pt reports that they will not let her rest and she needs to rest. Pt requested a doctor note stating she could \"rest\" instead of being in group \"14 hours a day\". Pt reports that she is originally from [place] and being here is also too far from home for her. Pt reports that she did come willingly and is not court ordered but does wish to be closer to home. SW updated provider.   RUBÉN Buck MSW CSW  "

## 2023-08-22 VITALS
BODY MASS INDEX: 21.21 KG/M2 | DIASTOLIC BLOOD PRESSURE: 86 MMHG | OXYGEN SATURATION: 100 % | TEMPERATURE: 97.5 F | SYSTOLIC BLOOD PRESSURE: 115 MMHG | WEIGHT: 108.03 LBS | HEIGHT: 60 IN | HEART RATE: 87 BPM | RESPIRATION RATE: 18 BRPM

## 2023-08-22 LAB — SARS-COV-2 RNA RESP QL NAA+PROBE: NOT DETECTED

## 2023-08-22 PROCEDURE — 87635 SARS-COV-2 COVID-19 AMP PRB: CPT

## 2023-08-22 RX ORDER — HYDROXYZINE HYDROCHLORIDE 25 MG/1
25 TABLET, FILM COATED ORAL ONCE
Status: COMPLETED | OUTPATIENT
Start: 2023-08-22 | End: 2023-08-22

## 2023-08-22 RX ADMIN — HYDROXYZINE HYDROCHLORIDE 25 MG: 25 TABLET, FILM COATED ORAL at 16:32

## 2023-08-22 NOTE — SIGNIFICANT NOTE
08/22/23 1007   Plan   Plan Comments [facility] declined due to acuity   Final Discharge Disposition Code 65 - psychiatric hospital or unit

## 2023-08-22 NOTE — SIGNIFICANT NOTE
08/22/23 1058   Plan   Plan Comments PT referral was re-reviewed by [facility] and was accepted for inpatient psychiatric trreatment. Accepting physician is Mj Duckworth. Pt will be transported to facility by EMS.   Final Discharge Disposition Code 65 - psychiatric hospital or unit

## 2023-08-22 NOTE — SIGNIFICANT NOTE
08/22/23 1035   Plan   Plan Comments [facility]. [facility], [facility] doesn't take insurasnce. [facility] only takes 55 and older.   Final Discharge Disposition Code 65 - psychiatric hospital or unit

## 2023-08-22 NOTE — SIGNIFICANT NOTE
08/22/23 0957   Plan   Plan Comments Due to SI with plan and intent, recommendation is for inpatient psychiatric treatment. There are currently no beds across the [facility] of inpatient facilities. [facility] declined due to acuity.   Final Discharge Disposition Code 65 - psychiatric hospital or unit

## 2023-08-22 NOTE — ED PROVIDER NOTES
"Time: 8:03 PM EDT  Date of encounter:  8/21/2023  Independent Historian/Clinical History and Information was obtained by:   Patient    History is limited by: N/A    Chief Complaint   Patient presents with    Psychiatric Evaluation         History of Present Illness:  Patient is a 32 y.o. year old female who presents to the emergency department for evaluation of psychiatric evaluation. Sent here by [facility] thinking she needs to go to [facility] and alcohol abuse.  Last alcohol intake was 6 days ago.  Drug use includes meth, spice, marijuana, etc.  States that she has had intermittent suicidal ideations but but earlier had gone away.  Now has returned.  States she will slit her throat.  Having bizarre thoughts.  Endorses leg aching.  Denies any chest pain, palpitations.  Denies any seizures, hallucinations. States \"my nerves are shot\". Had labs drawn earlier today. (Florentin Spencer PA-C provider in triage 8:04 PM EDT )     [facility]    Patient Care Team  Primary Care Provider: Provider, No Known    Past Medical History:     Allergies   Allergen Reactions    Amoxicillin Rash    Penicillins Rash     Past Medical History:   Diagnosis Date    Alcohol abuse     Anxiety     Autism spectrum disorder     Hearing loss     Learning disability     Substance abuse      History reviewed. No pertinent surgical history.  History reviewed. No pertinent family history.    Home Medications:  Prior to Admission medications    Medication Sig Start Date End Date Taking? Authorizing Provider   cyclobenzaprine (FLEXERIL) 5 MG tablet Take 1 tablet by mouth 3 (Three) Times a Day As Needed for Muscle Spasms.    ProviderJagdish MD   FLUoxetine (PROzac) 10 MG capsule Take 1 capsule by mouth Daily.    ProviderJagdish MD   fluticasone (FLONASE) 50 MCG/ACT nasal spray 2 sprays into the nostril(s) as directed by provider Daily.    ProviderJagdish MD   hydrOXYzine pamoate (VISTARIL) 25 MG capsule " Take 1 capsule by mouth 3 (Three) Times a Day As Needed for Itching, Anxiety or Allergies.    Jagdish Holland MD   ibuprofen (ADVIL,MOTRIN) 400 MG tablet Take 1 tablet by mouth Every 8 (Eight) Hours As Needed for Mild Pain.    Jagdish Holland MD   multivitamin with minerals tablet tablet Take 1 tablet by mouth Daily.    Jagdish Holland MD   naloxone (NARCAN) 4 MG/0.1ML nasal spray 1 spray into the nostril(s) as directed by provider As Needed.    Jagdish Holland MD   OLANZapine (zyPREXA) 5 MG tablet Take 1 tablet by mouth Every Night.    Jagdish Holland MD   traZODone (DESYREL) 50 MG tablet Take 1 tablet by mouth Every Night.    Jagdish Holland MD        Social History:   Social History     Tobacco Use    Smoking status: Every Day     Packs/day: 1.00     Years: 3.00     Pack years: 3.00     Types: Cigarettes    Smokeless tobacco: Never   Vaping Use    Vaping Use: Every day    Substances: Nicotine, THC, CBD, Flavoring   Substance Use Topics    Alcohol use: Yes     Comment: 1 pint every other day    Drug use: Yes     Types: Amphetamines, Marijuana, Benzodiazepines, Methamphetamines         Review of Systems:  Review of Systems   Constitutional:  Negative for chills and fever.   HENT:  Negative for congestion, ear pain and sore throat.    Eyes:  Negative for pain.   Respiratory:  Negative for cough, chest tightness and shortness of breath.    Cardiovascular:  Negative for chest pain.   Gastrointestinal:  Negative for abdominal pain, diarrhea, nausea and vomiting.   Genitourinary:  Negative for dysuria, flank pain and hematuria.   Musculoskeletal:  Positive for myalgias. Negative for joint swelling.   Skin:  Negative for pallor.   Neurological:  Negative for seizures and headaches.   Psychiatric/Behavioral:  Positive for dysphoric mood and suicidal ideas. Negative for hallucinations.    All other systems reviewed and are negative.     Physical Exam:  /71   Pulse 73   Temp 98.2  "øF (36.8 øC)   Resp 18   Ht 152.4 cm (60\")   Wt 49 kg (108 lb 0.4 oz)   LMP  (LMP Unknown)   SpO2 100%   BMI 21.10 kg/mý         Physical Exam  Vitals and nursing note reviewed.   Constitutional:       General: She is not in acute distress.     Appearance: Normal appearance. She is not toxic-appearing.   HENT:      Head: Normocephalic and atraumatic.      Right Ear: Tympanic membrane, ear canal and external ear normal.      Left Ear: Tympanic membrane, ear canal and external ear normal.      Nose: Nose normal.      Mouth/Throat:      Mouth: Mucous membranes are moist.   Eyes:      General: No scleral icterus.     Conjunctiva/sclera: Conjunctivae normal.   Cardiovascular:      Rate and Rhythm: Normal rate and regular rhythm.      Pulses: Normal pulses.      Heart sounds: Normal heart sounds.   Pulmonary:      Effort: Pulmonary effort is normal. No respiratory distress.      Breath sounds: Normal breath sounds.   Abdominal:      General: Bowel sounds are normal.      Palpations: Abdomen is soft.      Tenderness: There is no abdominal tenderness.   Musculoskeletal:         General: Normal range of motion.      Cervical back: Normal range of motion and neck supple.   Skin:     General: Skin is warm and dry.   Neurological:      Mental Status: She is alert and oriented to person, place, and time.      Sensory: No sensory deficit.      Motor: No weakness.   Psychiatric:      Comments: Patient reports depression and intermittent suicidal ideation with a current plan to slit her throat.  Patient states that she \"wants to go to [facility]\".    Patient denies any auditory or visual hallucinations              Procedures:  Procedures      Medical Decision Making:      Comorbidities that affect care:    Autism spectrum disorder, Smoking, Substance Abuse    External Notes reviewed:    Previous ED Note: ED note from earlier today.  Lab work was positive for THC.  Patient was evaluated by the clinical  and " was not having current suicidal ideation and was [facility]      The following orders were placed and all results were independently analyzed by me:  Orders Placed This Encounter   Procedures    COVID-19,CEPHEID/GAYATRI,COR/JASPREET/PAD/HANY/MAD IN-HOUSE(OR EMERGENT/ADD-ON),NP SWAB IN TRANSPORT MEDIA 3-4 HR TAT, RT-PCR - Swab, Nasopharynx    Diet: Regular/House Diet; Safe Tray; Texture: Regular Texture (IDDSI 7); Fluid Consistency: Thin (IDDSI 0)    Send referrals out to [facility]  [facility] [place]Specify)       Medications Given in the Emergency Department:  Medications   hydrOXYzine (ATARAX) tablet 25 mg (has no administration in time range)        ED Course:    The patient was initially evaluated in the triage area where orders were placed. The patient was later dispositioned by KEITH Chavis.      The patient was advised to stay for completion of workup which includes but is not limited to communication of labs and radiological results, reassessment and plan. The patient was advised that leaving prior to disposition by a provider could result in critical findings that are not communicated to the patient.     ED Course as of 08/22/23 1627   Tue Aug 22, 2023   0554 All facilities have called back and said no to this patient either for lack of bed or lack of meeting requirements.    [facility] states to try back at 10 and they will see if they have beds [DS]   0715 Pt is loudly verbalizing she needs help and is wanting to get out of this emergency department.  Patient was updated with plan of care and that she will be speaking with [facility] again at approximately 10:00 this morning.  Patient seems okay with that plan of care. [MS]   0738 [facility], after night shift staff contacted them about bed availability, and patient is speaking with them via telephone at this time [MS]   0857 [facility]appan advised that they do not have any beds available at  this time. [MS]   0904 ER CSW is aware of pt. [MS]   1115 Pt has been accepted at the [facility] by Dr. Mj Duckworth [MS]      ED Course User Index  [DS] Elizabeth Jarquin APRN  [MS] Oliiva Salter, KEITH       Labs:    Lab Results (last 24 hours)       Procedure Component Value Units Date/Time    COVID-19,CEPHEID/GAYATRI,COR/JASPREET/PAD/HANY/MAD IN-HOUSE(OR EMERGENT/ADD-ON),NP SWAB IN TRANSPORT MEDIA 3-4 HR TAT, RT-PCR - Swab, Nasopharynx [[id number]]  (Normal) Collected: 08/22/23 1104    Specimen: Swab from Nasopharynx Updated: 08/22/23 1145     COVID19 Not Detected    Narrative:      Fact sheet for providers: https://www.fda.gov/media/284989/download     Fact sheet for patients: https://www.fda.gov/media/590103/download  Fact sheet for providers: https://www.fda.gov/media/278997/download     Fact sheet for patients: https://www.fda.gov/media/752287/download             Imaging:    No Radiology Exams Resulted Within Past 24 Hours      Differential Diagnosis and Discussion:      Psychiatric: Differential diagnosis includes but is not limited to depression, psychosis, bipolar disorder, anxiety, manic episode, schizophrenia, and substance abuse.    All labs were reviewed and interpreted by me.    MDM  Number of Diagnoses or Management Options  Depression with suicidal ideation: new and does not require workup  Substance abuse: new and does not require workup     Amount and/or Complexity of Data Reviewed  Clinical lab tests: reviewed  Review and summarize past medical records: yes (I have personally reviewed patient's previous medical encounters.  )    Risk of Complications, Morbidity, and/or Mortality  Presenting problems: high  Diagnostic procedures: moderate  Management options: high    Patient Progress  Patient progress: stable         Patient Care Considerations:          Consultants/Shared Management Plan:        Social Determinants of Health:     was consulted and assisted with finding  an appropriate psych facility for pt and helped arrange transfer.       Disposition and Care Coordination:    Transferred: Through independent evaluation of the patient's history, physical, and imperical data, the patient meets criteria to be transferred to another hospital for evaluation/admission.        Final diagnoses:   Depression with suicidal ideation   Substance abuse        ED Disposition       ED Disposition   Transfer to Another Facility     Condition   --    Comment   --               This medical record created using voice recognition software.             Olivia Salter, KEITH  08/22/23 5310       Olviia Salter, KEITH  08/22/23 4830

## 2023-08-22 NOTE — ED NOTES
"Patient states she has had meth, spice, MJ, liquor 6 days ago with severe depression, denies auditory or visual hallucinations, states [facility] and had a \"meltdown\" of anxiety and wanting admission to [facility] or a similar facility for help.  Patient states she is suicidal with a plan of cutting her throat.  Patient is A&O x 4, pleasant but talks very loud and fast.  States she will be complaint with treatment plan  "

## 2025-05-06 ENCOUNTER — HOSPITAL ENCOUNTER (INPATIENT)
Age: 35
LOS: 3 days | Discharge: HOME | DRG: 885 | End: 2025-05-09
Attending: EMERGENCY MEDICINE | Admitting: PSYCHIATRY & NEUROLOGY
Payer: MEDICAID

## 2025-05-06 DIAGNOSIS — R45.851 DEPRESSION WITH SUICIDAL IDEATION: Primary | ICD-10-CM

## 2025-05-06 DIAGNOSIS — F32.A DEPRESSION WITH SUICIDAL IDEATION: Primary | ICD-10-CM

## 2025-05-06 PROBLEM — F39 MOOD DISORDER: Status: ACTIVE | Noted: 2025-05-06

## 2025-05-06 LAB
ALBUMIN SERPL-MCNC: 4.2 G/DL (ref 3.5–5.2)
ALP SERPL-CCNC: 99 U/L (ref 35–104)
ALT SERPL-CCNC: 97 U/L (ref 10–35)
AMPHET UR QL SCN: NEGATIVE
ANION GAP SERPL CALCULATED.3IONS-SCNC: 11 MMOL/L (ref 8–16)
APAP SERPL-MCNC: <5 UG/ML (ref 10–30)
AST SERPL-CCNC: 97 U/L (ref 10–35)
BACTERIA URNS QL MICRO: NEGATIVE /HPF
BARBITURATES UR QL SCN: NEGATIVE
BASOPHILS # BLD: 0 K/UL (ref 0–0.2)
BASOPHILS NFR BLD: 0.4 % (ref 0–1)
BENZODIAZ UR QL SCN: NEGATIVE
BILIRUB SERPL-MCNC: <0.2 MG/DL (ref 0.2–1.2)
BILIRUB UR QL STRIP: NEGATIVE
BUN SERPL-MCNC: 12 MG/DL (ref 6–20)
BUPRENORPHINE URINE: NEGATIVE
CALCIUM SERPL-MCNC: 9.4 MG/DL (ref 8.6–10)
CANNABINOIDS UR QL SCN: POSITIVE
CHLORIDE SERPL-SCNC: 102 MMOL/L (ref 98–107)
CLARITY UR: ABNORMAL
CO2 SERPL-SCNC: 25 MMOL/L (ref 22–29)
COCAINE UR QL SCN: NEGATIVE
COLOR UR: YELLOW
CREAT SERPL-MCNC: 0.7 MG/DL (ref 0.5–0.9)
CRYSTALS URNS MICRO: ABNORMAL /HPF
DRUG SCREEN COMMENT UR-IMP: ABNORMAL
EOSINOPHIL # BLD: 0.3 K/UL (ref 0–0.6)
EOSINOPHIL NFR BLD: 4.5 % (ref 0–5)
EPI CELLS #/AREA URNS AUTO: 17 /HPF (ref 0–5)
ERYTHROCYTE [DISTWIDTH] IN BLOOD BY AUTOMATED COUNT: 12.8 % (ref 11.5–14.5)
ETHANOLAMINE SERPL-MCNC: <11 MG/DL (ref 0–11)
FENTANYL SCREEN, URINE: NEGATIVE
GLUCOSE SERPL-MCNC: 86 MG/DL (ref 70–99)
GLUCOSE UR STRIP.AUTO-MCNC: NEGATIVE MG/DL
HCG UR QL: NEGATIVE
HCT VFR BLD AUTO: 41.4 % (ref 37–47)
HGB BLD-MCNC: 13.8 G/DL (ref 12–16)
HGB UR STRIP.AUTO-MCNC: NEGATIVE MG/L
HYALINE CASTS #/AREA URNS AUTO: 0 /HPF (ref 0–8)
IMM GRANULOCYTES # BLD: 0 K/UL
KETONES UR STRIP.AUTO-MCNC: NEGATIVE MG/DL
LEUKOCYTE ESTERASE UR QL STRIP.AUTO: ABNORMAL
LYMPHOCYTES # BLD: 1.7 K/UL (ref 1.1–4.5)
LYMPHOCYTES NFR BLD: 22.7 % (ref 20–40)
MCH RBC QN AUTO: 33.4 PG (ref 27–31)
MCHC RBC AUTO-ENTMCNC: 33.3 G/DL (ref 33–37)
MCV RBC AUTO: 100.2 FL (ref 81–99)
METHADONE UR QL SCN: NEGATIVE
METHAMPHETAMINE, URINE: NEGATIVE
MONOCYTES # BLD: 0.7 K/UL (ref 0–0.9)
MONOCYTES NFR BLD: 9.4 % (ref 0–10)
NEUTROPHILS # BLD: 4.7 K/UL (ref 1.5–7.5)
NEUTS SEG NFR BLD: 62.6 % (ref 50–65)
NITRITE UR QL STRIP.AUTO: NEGATIVE
OPIATES UR QL SCN: NEGATIVE
OXYCODONE UR QL SCN: NEGATIVE
PCP UR QL SCN: NEGATIVE
PH UR STRIP.AUTO: 8 [PH] (ref 5–8)
PLATELET # BLD AUTO: 260 K/UL (ref 130–400)
PMV BLD AUTO: 9.4 FL (ref 9.4–12.3)
POTASSIUM SERPL-SCNC: 4.4 MMOL/L (ref 3.5–5)
PROT SERPL-MCNC: 7.4 G/DL (ref 6.4–8.3)
PROT UR STRIP.AUTO-MCNC: NEGATIVE MG/DL
RBC # BLD AUTO: 4.13 M/UL (ref 4.2–5.4)
RBC #/AREA URNS AUTO: 1 /HPF (ref 0–4)
SALICYLATES SERPL-MCNC: 0.5 MG/DL (ref 3–10)
SARS-COV-2 RDRP RESP QL NAA+PROBE: NOT DETECTED
SODIUM SERPL-SCNC: 138 MMOL/L (ref 136–145)
SP GR UR STRIP.AUTO: 1.01 (ref 1–1.03)
TRICYCLIC ANTIDEPRESSANTS, UR: NEGATIVE
UROBILINOGEN UR STRIP.AUTO-MCNC: 0.2 E.U./DL
VALPROATE SERPL-MCNC: <2.8 UG/ML (ref 50–100)
WBC # BLD AUTO: 7.5 K/UL (ref 4.8–10.8)
WBC #/AREA URNS AUTO: 8 /HPF (ref 0–5)

## 2025-05-06 PROCEDURE — 80179 DRUG ASSAY SALICYLATE: CPT

## 2025-05-06 PROCEDURE — 99285 EMERGENCY DEPT VISIT HI MDM: CPT

## 2025-05-06 PROCEDURE — 80164 ASSAY DIPROPYLACETIC ACD TOT: CPT

## 2025-05-06 PROCEDURE — 81001 URINALYSIS AUTO W/SCOPE: CPT

## 2025-05-06 PROCEDURE — 80307 DRUG TEST PRSMV CHEM ANLYZR: CPT

## 2025-05-06 PROCEDURE — 84703 CHORIONIC GONADOTROPIN ASSAY: CPT

## 2025-05-06 PROCEDURE — 36415 COLL VENOUS BLD VENIPUNCTURE: CPT

## 2025-05-06 PROCEDURE — 96372 THER/PROPH/DIAG INJ SC/IM: CPT

## 2025-05-06 PROCEDURE — 6370000000 HC RX 637 (ALT 250 FOR IP): Performed by: PSYCHIATRY & NEUROLOGY

## 2025-05-06 PROCEDURE — 6360000002 HC RX W HCPCS: Performed by: EMERGENCY MEDICINE

## 2025-05-06 PROCEDURE — 1240000000 HC EMOTIONAL WELLNESS R&B

## 2025-05-06 PROCEDURE — 82077 ASSAY SPEC XCP UR&BREATH IA: CPT

## 2025-05-06 PROCEDURE — G0480 DRUG TEST DEF 1-7 CLASSES: HCPCS

## 2025-05-06 PROCEDURE — 80143 DRUG ASSAY ACETAMINOPHEN: CPT

## 2025-05-06 PROCEDURE — 80053 COMPREHEN METABOLIC PANEL: CPT

## 2025-05-06 PROCEDURE — 85025 COMPLETE CBC W/AUTO DIFF WBC: CPT

## 2025-05-06 PROCEDURE — 87635 SARS-COV-2 COVID-19 AMP PRB: CPT

## 2025-05-06 RX ORDER — POLYETHYLENE GLYCOL 3350 17 G
2 POWDER IN PACKET (EA) ORAL
Status: DISCONTINUED | OUTPATIENT
Start: 2025-05-06 | End: 2025-05-09 | Stop reason: HOSPADM

## 2025-05-06 RX ORDER — MECOBALAMIN 5000 MCG
5 TABLET,DISINTEGRATING ORAL NIGHTLY
Status: DISCONTINUED | OUTPATIENT
Start: 2025-05-06 | End: 2025-05-09 | Stop reason: HOSPADM

## 2025-05-06 RX ORDER — NICOTINE 21 MG/24HR
1 PATCH, TRANSDERMAL 24 HOURS TRANSDERMAL DAILY
Status: DISCONTINUED | OUTPATIENT
Start: 2025-05-06 | End: 2025-05-06

## 2025-05-06 RX ORDER — NICOTINE 21 MG/24HR
1 PATCH, TRANSDERMAL 24 HOURS TRANSDERMAL DAILY
Status: DISCONTINUED | OUTPATIENT
Start: 2025-05-07 | End: 2025-05-09 | Stop reason: HOSPADM

## 2025-05-06 RX ORDER — ACETAMINOPHEN 325 MG/1
650 TABLET ORAL EVERY 4 HOURS PRN
Status: DISCONTINUED | OUTPATIENT
Start: 2025-05-06 | End: 2025-05-09 | Stop reason: HOSPADM

## 2025-05-06 RX ORDER — POLYETHYLENE GLYCOL 3350 17 G/17G
17 POWDER, FOR SOLUTION ORAL DAILY PRN
Status: DISCONTINUED | OUTPATIENT
Start: 2025-05-06 | End: 2025-05-09 | Stop reason: HOSPADM

## 2025-05-06 RX ORDER — PROMETHAZINE HYDROCHLORIDE 25 MG/ML
25 INJECTION, SOLUTION INTRAMUSCULAR; INTRAVENOUS ONCE
Status: COMPLETED | OUTPATIENT
Start: 2025-05-06 | End: 2025-05-06

## 2025-05-06 RX ORDER — MIRTAZAPINE 7.5 MG/1
7.5 TABLET, FILM COATED ORAL NIGHTLY
Status: DISCONTINUED | OUTPATIENT
Start: 2025-05-06 | End: 2025-05-09 | Stop reason: HOSPADM

## 2025-05-06 RX ORDER — HYDROXYZINE HYDROCHLORIDE 25 MG/1
25 TABLET, FILM COATED ORAL 3 TIMES DAILY PRN
Status: DISCONTINUED | OUTPATIENT
Start: 2025-05-06 | End: 2025-05-09 | Stop reason: HOSPADM

## 2025-05-06 RX ORDER — MIRTAZAPINE 15 MG/1
7.5 TABLET, FILM COATED ORAL NIGHTLY
Status: ON HOLD | COMMUNITY
End: 2025-05-09

## 2025-05-06 RX ADMIN — PROMETHAZINE HYDROCHLORIDE 25 MG: 25 INJECTION INTRAMUSCULAR; INTRAVENOUS at 15:39

## 2025-05-06 RX ADMIN — HYDROXYZINE HYDROCHLORIDE 25 MG: 25 TABLET, FILM COATED ORAL at 21:04

## 2025-05-06 RX ADMIN — NICOTINE POLACRILEX 2 MG: 2 LOZENGE ORAL at 18:18

## 2025-05-06 RX ADMIN — Medication 5 MG: at 21:04

## 2025-05-06 RX ADMIN — MIRTAZAPINE 7.5 MG: 7.5 TABLET, FILM COATED ORAL at 21:04

## 2025-05-06 SDOH — ECONOMIC STABILITY: FOOD INSECURITY: WITHIN THE PAST 12 MONTHS, YOU WORRIED THAT YOUR FOOD WOULD RUN OUT BEFORE YOU GOT MONEY TO BUY MORE.: OFTEN TRUE

## 2025-05-06 SDOH — ECONOMIC STABILITY: INCOME INSECURITY: HOW HARD IS IT FOR YOU TO PAY FOR THE VERY BASICS LIKE FOOD, HOUSING, MEDICAL CARE, AND HEATING?: SOMEWHAT HARD

## 2025-05-06 SDOH — ECONOMIC STABILITY: INCOME INSECURITY: IN THE PAST 12 MONTHS, HAS THE ELECTRIC, GAS, OIL, OR WATER COMPANY THREATENED TO SHUT OFF SERVICE IN YOUR HOME?: NO

## 2025-05-06 ASSESSMENT — PATIENT HEALTH QUESTIONNAIRE - PHQ9
SUM OF ALL RESPONSES TO PHQ QUESTIONS 1-9: 16
SUM OF ALL RESPONSES TO PHQ QUESTIONS 1-9: 16
5. POOR APPETITE OR OVEREATING: NOT AT ALL
8. MOVING OR SPEAKING SO SLOWLY THAT OTHER PEOPLE COULD HAVE NOTICED. OR THE OPPOSITE, BEING SO FIGETY OR RESTLESS THAT YOU HAVE BEEN MOVING AROUND A LOT MORE THAN USUAL: NEARLY EVERY DAY
4. FEELING TIRED OR HAVING LITTLE ENERGY: SEVERAL DAYS
2. FEELING DOWN, DEPRESSED OR HOPELESS: NEARLY EVERY DAY
SUM OF ALL RESPONSES TO PHQ QUESTIONS 1-9: 6
1. LITTLE INTEREST OR PLEASURE IN DOING THINGS: MORE THAN HALF THE DAYS
9. THOUGHTS THAT YOU WOULD BE BETTER OFF DEAD, OR OF HURTING YOURSELF: MORE THAN HALF THE DAYS
1. LITTLE INTEREST OR PLEASURE IN DOING THINGS: MORE THAN HALF THE DAYS
4. FEELING TIRED OR HAVING LITTLE ENERGY: NOT AT ALL
6. FEELING BAD ABOUT YOURSELF - OR THAT YOU ARE A FAILURE OR HAVE LET YOURSELF OR YOUR FAMILY DOWN: SEVERAL DAYS
SUM OF ALL RESPONSES TO PHQ QUESTIONS 1-9: 6
3. TROUBLE FALLING OR STAYING ASLEEP: SEVERAL DAYS
SUM OF ALL RESPONSES TO PHQ QUESTIONS 1-9: 6
5. POOR APPETITE OR OVEREATING: SEVERAL DAYS
SUM OF ALL RESPONSES TO PHQ QUESTIONS 1-9: 14
3. TROUBLE FALLING OR STAYING ASLEEP: MORE THAN HALF THE DAYS
SUM OF ALL RESPONSES TO PHQ QUESTIONS 1-9: 16
SUM OF ALL RESPONSES TO PHQ QUESTIONS 1-9: 6
7. TROUBLE CONCENTRATING ON THINGS, SUCH AS READING THE NEWSPAPER OR WATCHING TELEVISION: NEARLY EVERY DAY
2. FEELING DOWN, DEPRESSED OR HOPELESS: SEVERAL DAYS
10. IF YOU CHECKED OFF ANY PROBLEMS, HOW DIFFICULT HAVE THESE PROBLEMS MADE IT FOR YOU TO DO YOUR WORK, TAKE CARE OF THINGS AT HOME, OR GET ALONG WITH OTHER PEOPLE: NOT DIFFICULT AT ALL

## 2025-05-06 ASSESSMENT — LIFESTYLE VARIABLES
HOW OFTEN DO YOU HAVE A DRINK CONTAINING ALCOHOL: 4 OR MORE TIMES A WEEK
HOW MANY STANDARD DRINKS CONTAINING ALCOHOL DO YOU HAVE ON A TYPICAL DAY: 5 OR 6
HOW MANY STANDARD DRINKS CONTAINING ALCOHOL DO YOU HAVE ON A TYPICAL DAY: 5 OR 6
HOW OFTEN DO YOU HAVE A DRINK CONTAINING ALCOHOL: 4 OR MORE TIMES A WEEK

## 2025-05-06 ASSESSMENT — SLEEP AND FATIGUE QUESTIONNAIRES
DO YOU USE A SLEEP AID: YES
AVERAGE NUMBER OF SLEEP HOURS: 4
DO YOU HAVE DIFFICULTY SLEEPING: YES

## 2025-05-06 NOTE — DISCHARGE INSTRUCTIONS
(LIHEAP)  Federally-funded program to help eligible, low-income households meet their heating/cooling needs.   Website: https://www.[facility].ky.gov/agencies/dcbs/dfs/pdb/Pages/liheap.aspx  8.496.392.1331      Crisis Resources    [facility] Family Support Office  [address] [place], [place] [address]  [phone]  [facility]   [address], [place] [address]  Website: https://www.[facility].org/  014-656-5787  [employer] [facility]   [address], [place] [address]  Website: https://www.[employer]/  [phone]  [facility]  [address] [place], [place] [address]  Website: [email or web address]/  [phone]  [facility]  [address], [place] [address]  [phone]  [facility]  [address], [place] [address]  [phone]  [facility]  [address], [place] [address]  [phone]    [facility] Family Support Office  [address] [address]  [phone] or [phone]  [facility]  [address], [place] [address]  Website: https://www.[place].org/  [phone]  [facility]  [address] [place], [place] [address]  [phone]    [facility]  [address] [place], [place] [address]  Website: https://www.[email or web address]/  [phone]  [facility] Family Support Office  [address] [address]  [phone]    [facility]  [address] [place], [place] [address]  Website: [email or web address]/  [phone]  [facility] Family Support Office  [address] [place] [address]  [phone]    [facility]  [address] [place] [address]  Website: [email or web address]/  [phone]  [facility] Family Support Office  [address]

## 2025-05-06 NOTE — ED PROVIDER NOTES
homicidal ideation.  DIAGNOSTIC RESULTS     EKG: None    RADIOLOGY:        No orders to display           LABS:  Labs Reviewed   COVID-19, RAPID   DRUG SCRN, BUPRENORPHINE   URINALYSIS WITH REFLEX TO CULTURE   PREGNANCY, URINE   CBC WITH AUTO DIFFERENTIAL   ACETAMINOPHEN LEVEL   SALICYLATE LEVEL   ETHANOL   COMPREHENSIVE METABOLIC PANEL W/ REFLEX TO MG FOR LOW K   VALPROIC ACID LEVEL, TOTAL       All other labs were within normal range or not returned as of this dictation.    Prior records reviewed: Multiple prior visits for similar complaints however last visit was in August 2023.    EMERGENCY DEPARTMENT COURSE and DIFFERENTIAL DIAGNOSIS/MDM:   Vitals:    Vitals:    05/06/25 1127   BP: 129/87   Pulse: 78   Resp: 18   Temp: 97.9 °F (36.6 °C)   TempSrc: Oral   SpO2: 98%   Weight: 45.4 kg (100 lb)   Height: 1.549 m (5' 1\")       MDM  The patient is a 33 yo F with a history of depression who presents to the ED c/o depression with suicidal ideation. She says she has been in drug rehab for about 7 days.  She says this morning she noted worsening feelings of depression with suicidal ideation.  She says she had a knife with her and has been having thoughts of cutting her throat.  She reports prior suicide attempts years ago.  She also says that her anxiety has worsened.  She denies any thoughts of harming others, sleep difficulties, hallucinations.  She denies any recent illness, fever, chills, cough, dysuria, urinary frequency or urgency.  Patient says she takes Depakote and Remeron and another medication for mental health that she cannot recall the medication at this time.  She has been compliant with her medications.  There are no other complaints or concerns at this time.      Prior records reviewed by me as noted above. I independently reviewed the results of the ED workup.  Urinalysis appears contaminated and not suggestive of UTI.  Urine drug screen positive for cannabinoids.  She is not pregnant.  White blood cell

## 2025-05-06 NOTE — ED NOTES
ED TO INPATIENT SBAR HANDOFF    Patient Name: Jaye Matamoros   : 1990  34 y.o.   Family/Caregiver Present: No  Code Status Order: Prior    C-SSRS: Risk of Suicide: High Risk  Sitter Yes  Restraints:         Situation  Chief Complaint:   Chief Complaint   Patient presents with    Suicidal     SI with plan to cut throat; states dropped off by her rehab center; in rehab for drug use per pt     Patient Diagnosis: No admission diagnoses are documented for this encounter.     Brief Description of Patient's Condition: suicidal ideation with family stressors  Mental Status: oriented, alert, and coherent  Arrived from: home    Imaging:   No orders to display     COVID-19 Results:   Internal Administration   First Dose      Second Dose           Last COVID Lab SARS-CoV-2, NAAT (no units)   Date Value   2025 Not Detected           Abnormal labs:   Abnormal Labs Reviewed   URINALYSIS WITH REFLEX TO CULTURE - Abnormal; Notable for the following components:       Result Value    Clarity, UA CLOUDY (*)     Leukocyte Esterase, Urine LARGE (*)     All other components within normal limits   DRUG SCRN, BUPRENORPHINE - Abnormal; Notable for the following components:    Cannabinoid Scrn, Ur POSITIVE (*)     All other components within normal limits   CBC WITH AUTO DIFFERENTIAL - Abnormal; Notable for the following components:    RBC 4.13 (*)     .2 (*)     MCH 33.4 (*)     All other components within normal limits   ACETAMINOPHEN LEVEL - Abnormal; Notable for the following components:    Acetaminophen Level <5 (*)     All other components within normal limits   SALICYLATE LEVEL - Abnormal; Notable for the following components:    Salicylate Lvl 0.5 (*)     All other components within normal limits   COMPREHENSIVE METABOLIC PANEL W/ REFLEX TO MG FOR LOW K - Abnormal; Notable for the following components:    ALT 97 (*)     AST 97 (*)     All other components within normal limits   MICROSCOPIC URINALYSIS - Abnormal;
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
## 2025-05-07 PROBLEM — F39 UNSPECIFIED MOOD (AFFECTIVE) DISORDER: Status: ACTIVE | Noted: 2025-05-07

## 2025-05-07 LAB
C TRACH DNA UR QL NAA+PROBE: NOT DETECTED
CHOLEST SERPL-MCNC: 160 MG/DL (ref 0–199)
HBA1C MFR BLD: 4.9 % (ref 4–5.6)
HDLC SERPL-MCNC: 44 MG/DL (ref 40–60)
LDLC SERPL CALC-MCNC: 86 MG/DL
N GONORRHOEA DNA UR QL NAA+PROBE: NOT DETECTED
T VAGINALIS DNA UR QL NAA+PROBE: DETECTED
TRIGL SERPL-MCNC: 148 MG/DL (ref 0–149)

## 2025-05-07 PROCEDURE — 87491 CHLMYD TRACH DNA AMP PROBE: CPT

## 2025-05-07 PROCEDURE — 6370000000 HC RX 637 (ALT 250 FOR IP): Performed by: PSYCHIATRY & NEUROLOGY

## 2025-05-07 PROCEDURE — 84443 ASSAY THYROID STIM HORMONE: CPT

## 2025-05-07 PROCEDURE — 80061 LIPID PANEL: CPT

## 2025-05-07 PROCEDURE — 87661 TRICHOMONAS VAGINALIS AMPLIF: CPT

## 2025-05-07 PROCEDURE — 83036 HEMOGLOBIN GLYCOSYLATED A1C: CPT

## 2025-05-07 PROCEDURE — 82607 VITAMIN B-12: CPT

## 2025-05-07 PROCEDURE — 1240000000 HC EMOTIONAL WELLNESS R&B

## 2025-05-07 PROCEDURE — 82306 VITAMIN D 25 HYDROXY: CPT

## 2025-05-07 PROCEDURE — 87591 N.GONORRHOEAE DNA AMP PROB: CPT

## 2025-05-07 PROCEDURE — 36415 COLL VENOUS BLD VENIPUNCTURE: CPT

## 2025-05-07 PROCEDURE — 6370000000 HC RX 637 (ALT 250 FOR IP): Performed by: FAMILY MEDICINE

## 2025-05-07 RX ORDER — DIVALPROEX SODIUM 500 MG/1
500 TABLET, FILM COATED, EXTENDED RELEASE ORAL DAILY
Status: DISCONTINUED | OUTPATIENT
Start: 2025-05-07 | End: 2025-05-09 | Stop reason: HOSPADM

## 2025-05-07 RX ORDER — METRONIDAZOLE 500 MG/1
500 TABLET ORAL EVERY 12 HOURS SCHEDULED
Status: DISCONTINUED | OUTPATIENT
Start: 2025-05-08 | End: 2025-05-09 | Stop reason: HOSPADM

## 2025-05-07 RX ADMIN — NICOTINE POLACRILEX 2 MG: 2 LOZENGE ORAL at 12:24

## 2025-05-07 RX ADMIN — ACETAMINOPHEN 650 MG: 325 TABLET ORAL at 06:58

## 2025-05-07 RX ADMIN — MIRTAZAPINE 7.5 MG: 7.5 TABLET, FILM COATED ORAL at 20:42

## 2025-05-07 RX ADMIN — NICOTINE POLACRILEX 2 MG: 2 LOZENGE ORAL at 18:19

## 2025-05-07 RX ADMIN — Medication 5 MG: at 20:42

## 2025-05-07 RX ADMIN — DIVALPROEX SODIUM 500 MG: 500 TABLET, FILM COATED, EXTENDED RELEASE ORAL at 11:06

## 2025-05-07 RX ADMIN — HYDROXYZINE HYDROCHLORIDE 25 MG: 25 TABLET, FILM COATED ORAL at 20:42

## 2025-05-07 RX ADMIN — HYDROXYZINE HYDROCHLORIDE 25 MG: 25 TABLET, FILM COATED ORAL at 11:06

## 2025-05-07 RX ADMIN — ACETAMINOPHEN, ASPIRIN, CAFFEINE 1 TABLET: 250; 65; 250 TABLET, FILM COATED ORAL at 09:16

## 2025-05-07 ASSESSMENT — PAIN - FUNCTIONAL ASSESSMENT: PAIN_FUNCTIONAL_ASSESSMENT: PREVENTS OR INTERFERES SOME ACTIVE ACTIVITIES AND ADLS

## 2025-05-07 ASSESSMENT — LIFESTYLE VARIABLES
HOW OFTEN DO YOU HAVE A DRINK CONTAINING ALCOHOL: 4 OR MORE TIMES A WEEK
HOW MANY STANDARD DRINKS CONTAINING ALCOHOL DO YOU HAVE ON A TYPICAL DAY: 5 OR 6

## 2025-05-07 ASSESSMENT — PAIN SCALES - GENERAL: PAINLEVEL_OUTOF10: 8

## 2025-05-07 ASSESSMENT — PAIN DESCRIPTION - LOCATION: LOCATION: HEAD

## 2025-05-07 ASSESSMENT — PAIN DESCRIPTION - DESCRIPTORS: DESCRIPTORS: POUNDING

## 2025-05-07 NOTE — PROGRESS NOTES
Nursing Admission Note    Reason for Admission: Jaye Matamoros is a 34 yoa  presenting to ER from [facility].  Pt reports increased anxiety from being around all the people in rehab.  Increased anxiety contributed to SI with a plan to stab herself with a pointed fingernail file in her manicure set.  Pt told staff at rehab and was brought here.    Additional Notes:      Patient Active Problem List   Diagnosis    Methamphetamine use disorder, severe (HCC)    Synthetic cannabinoid abuse (HCC)    Alcohol abuse    Persistent mood (affective) disorder, unspecified    Intellectual disability    Severe malnutrition    Depression with suicidal ideation    Bipolar affective disorder, current episode mixed, without psychotic features (HCC)    Mood disorder       C-SSRS:  C-SSRS Completed: YES  Risk Assessment Completed: YES    Risk of Suicide per C-SSRS: Risk of Suicide: High Risk  KACI  or Admitting Nurse discussed C-SSRS and Risk Assessment Findings with the Provider: The 1:1 order will be discontinued by the provider on admission to the Behavioral Health Unit due to the following reasons: 1. Patient is on 15-minute safety checks 2. Safety features on the unit. 3. Patient reported no thoughts of harming self while on the unit.              Previous safety concerns while on unit? No      Suicidal Ideation: yes.    If yes, is there a plan?(Describe) N/A  Homicidal Ideation:  no.   If yes, describe: N/A  Auditory/Visual Hallucinations:  no.    If yes, describe AVH? N/A    Addictive Behavior:   Addictive Behavior  In the Past 3 Months, Have You Felt or Has Someone Told You That You Have a Problem With  : Other (comment) (Drugs)    Mental Status EXAM:  Mental Status and Behavioral Exam  Normal: No  Level of Assistance: Independent/Self  Facial Expression: Brightened, Exaggerated  Affect: Appropriate  Level of Consciousness: Alert  Frequency of Checks: 4 times per hour, close  Mood:Normal: No  Mood: Depressed,

## 2025-05-07 NOTE — PROGRESS NOTES
JANET contacted [facility] to make sure the pt would be able to return there once she is safe to discharge to continue her program there, and spoke with Caden, who reported that their admissions coordinator Zoraida said that they will not be able to accept her back after discharge, and said that she needs a dual diagnosis facility moving forward. JANET will inform the pt and her provider of this information and will give the pt the list of other rehab facilities in the area for her to start calling to find another facility.

## 2025-05-07 NOTE — PROGRESS NOTES
Admission Note      Reason for admission/Target Symptom: Per nursing admission assessment - Reason for Admission: Jaye Matamoros is a 34 yoa  presenting to ER from [facility].  Pt reports increased anxiety from being around all the people in rehab.  Increased anxiety contributed to SI with a plan to stab herself with a pointed fingernail file in her manicure set.  Pt told staff at rehab and was brought here.    Diagnoses: Mood Disorder; Stimulant Use Disorder, severe, Cannabis Use Disorder, severe    UDS: Positive for Cannabis  BAL: Negative <11    SW will meet with treatment team to discuss patient's treatment including care planning, discharge planning, and follow-up needs. Patient has been admitted to [facility].     Treatment team will identify the patient's discharge needs. Appointments will be made for medication management and outpatient therapy/counseling. Pt confirmed the need for ongoing treatment post inpatient stay. Pt was also provided a handout of contact information for drug and alcohol treatment centers and other community support service such as [facility], AA, and [facility].

## 2025-05-07 NOTE — PROGRESS NOTES
SW met with patient to complete psychosocial, lifetime CSSR-S and admission OQ-30 Questionnaire on this date. Patients long and short-term goals discussed. Patient voiced understanding. Treatment plan sheet signed. Patient verbalized understanding of the treatment plan. Patient participated in goals and objectives of the treatment plan. Patient discussed safety plan with .    In the last 6 months has the patient been a danger to self: No  In the last 6 months has the patient been a danger to others: No  Legal Guardian/POA: No    Activity Assessment:  Skills: coloring, talking, painting and go on walks   Talents: coloring, talking, painting and go on walks   Interests: coloring, talking, painting and go on walks     Provided patient with [employer] handout entitled \"Quitting Smoking.\"  Reviewed handout with patient: addressing dangers of smoking, developing coping skills, and providing basic information about quitting.       Patient received all components practical counseling of tobacco practical counseling during the hospital stay.

## 2025-05-07 NOTE — H&P
Department of Psychiatry  History and Physical - Adult         CHIEF COMPLAINT: Suicidal ideations    History obtained from:  patient    HISTORY OF PRESENT ILLNESS:          The patient is a 34 y.o. female with previous psychiatric history of depression, bipolar disorder, opioid use disorder, stimulant use disorder, synthetic cannabis use disorder, cannabis use disorder, who has been admitted to our psychiatric unit from [facility] rehab facility secondary to suicidal ideations.    Patient is well-known to psychiatry due to multiple previous admissions to our psychiatric unit secondary to drugs intoxication.  During the previous admissions patient declined to consider any psychotropic medications for her mental health, as well as declined to be provided with referral to rehab facilities for drug use disorder.  Last time patient was admitted to our psychiatric unit in June 2023.    Patient has been seen in treatment team room with presence of the patient's nurse.  Patient reported that she is court ordered to get treatment in rehab facility and she was admitted to [facility] rehab facility 1 week ago.  However, she stated that yesterday she woke up in the morning and she felt suicidal. She talked to administration of rehab facility about her suicidal thoughts and she was brought to the hospital for evaluation.  Patient stated that she experienced suicidal ideations with plan to use her knife.  She reported that she was noncompliant with prescribed psychotropic medication and she would like to restart previously prescribed psychotropic medications to address her mental health issues.    In regards of affective symptomatology patient endorses mild feeling of anxiety, mild depression, decreased appetite and decreased quality of sleep.  However, patient stated she slept pretty well during the last night in the hospital with prescribed Remeron, reported that her appetite improved and she ate all of her breakfast today.  Patient

## 2025-05-07 NOTE — PROGRESS NOTES
[facility] Adult Unit Daily Assessment  Nursing Progress Note    Room: [address] [place], [place] [address]   Name: Jaye Matamoros   Age: 34 y.o.   Gender: female   Dx: Mood disorder  Precautions: suicide risk  Inpatient Status: voluntary     SLEEP:  Sleep Quality Good  Sleep Medications: No   PRN Sleep Meds: Yes; atarax 25mg     MEDICAL:  Other PRN Meds: No   Med Compliant: Yes  Accu-Chek: No  Oxygen/CPAP/BiPAP: No  CIWA/CINA: No   PAIN Assessment: denies  Side Effects from medication: none voiced    Metabolic Screening:  Lab Results   Component Value Date    LABA1C 5.3 01/03/2020     Lab Results   Component Value Date    CHOL 87 (L) 01/03/2020    CHOL 95 (L) 11/15/2017     Lab Results   Component Value Date    TRIG 79 01/03/2020    TRIG 86 11/15/2017     Lab Results   Component Value Date    HDL 38 (L) 01/03/2020    HDL 30 (L) 11/15/2017     No components found for: \"LDLCAL\"  No components found for: \"LABVLDL\"  Body mass index is 21.88 kg/m².  BP Readings from Last 2 Encounters:   05/06/25 111/73   06/26/23 103/62       Medical Bed:   Is patient in a medical bed? no   If medical bed is in use, has nursing secured room while patient is awake and out of the room? NA  Has safety checks by nursing been completed on the bed/room this shift? yes    Protective Factors:  Patient identifies protective factors with nursing staff as follows:   Identifies reasons for living: Yes   Supportive Social Network or family: Yes    Belief that suicide is immoral/high spirituality: Yes   Responsibility to family or others/living with family: Yes   Fear of death or dying due to pain and suffering: Yes   Engaged in work or school: No  If Patient is unable to identify, reason why? N/A    Depression: 6   Anxiety: \"yes, worried about going back to rehab\"   SI denies suicidal ideation   Risk of Suicide: No Risk  HI Negative for homicidal ideation        AVH:no If Hallucinations are present, describe? N/A    Appetite: good   Percent Meals: no meals consumed during this

## 2025-05-07 NOTE — PROGRESS NOTES
Group Note    Date: 05/06/25  Start Time: 8:00 PM   End Time:8:30 PM     Number of Participants: 7    Type of Group: Wrap-Up     Patient's Goal:  N/a    Notes:  Did not list a goal. Energy, appetite, concentration is normal. Depression same and anxiety was constant.     Status After Intervention:      Participation Level: Active Listener    Participation Quality: Appropriate    Speech:  normal    Thought Process/Content: Logical    Mood:  Calm    Level of consciousness:  Alert    Response to Learning: Able to verbalize current knowledge/experience    Modes of Intervention: Education and Support    Discipline Responsible: Behavioral Health Technician     Signature:  Fidencio Diallo

## 2025-05-07 NOTE — RESEARCH
Collateral obtained from: Yamile (Pt's mother [phone]):    Immediate Stressors & Time Episode Began: Mom reports she spoke to her last week while she was at [facility] and she was crying and very emotional. She was telling her she loved her. She was also in a hospital in [place] mom says recently, for an episode of Depression and Anxiety. She was discharged to a sober living home in [place] and she left there, went to [facility] and then went to [facility] in [place]. Mom reports the pt was in [facility] from November-March for something drug related.Mom reports the patient has cervical cancer. Mom says she needs to get her mind right and get on some medications.     Diagnosis/Hx of compliance with meds: Mom reports she has Depression, Anxiety, ADHD and Cognitive Impairment. She says she is suppose to be on medications but not sure if she is.    Tx Hx/Past hospitalizations:  caller reports previous inpatient treatment history --- history includes multiple inpt psychiatric hospitalizations and multiple rehabs     Family hx of psychiatric issues: caller reports family history of psychiatric issues -- history includes mother has a hx of Depression and Anxiety    Substance Abuse: caller reports substance abuse history -- history includes Pt has been using drugs for a very long time    Pending Legal: patient's history unknown to caller    Safety Issues (Weapons? Hx of attempts): The importance of locking weapons in a secured location or removing from the home was explained and recommended to collateral caller.    Support system/Medication Managed by: The importance of medication management and locking extra medication in a secured location was explained and recommended to collateral caller.     Discharge Disposition: drug rehabilitation facility    Additional Info:

## 2025-05-07 NOTE — PROGRESS NOTES
Spiritual Health History and Assessment/Progress Note  [facility]    (P) Loneliness/Social Isolation,  ,  , (P) Spirituality Group    Name: Jaye Matamoros MRN: [id number]    Age: 34 y.o.     Sex: female   Language: English   Episcopal: Hinduism   Mood disorder     Date: 5/7/2025            Total Time Calculated: (P) 30 min              Spiritual Assessment began in [facility] 6 ADULT [facility]        Referral/Consult From: (P) Rounding   Encounter Overview/Reason: (P) Loneliness/Social Isolation  Service Provided For: (P) Patient    Isabella, Belief, Meaning:   Patient identifies as spiritual  Family/Friends No family/friends present      Importance and Influence:  Patient has spiritual/personal beliefs that influence decisions regarding their health  Family/Friends No family/friends present    Community:  Patient feels well-supported. Support system includes: Unknown  Family/Friends No family/friends present    Assessment and Plan of Care:     Patient Interventions include: Facilitated expression of thoughts and feelings and Affirmed coping skills/support systems  Family/Friends Interventions include: No family/friends present    Patient Plan of Care: No spiritual needs identified for follow-up  Family/Friends Plan of Care: No family/friends present    Electronically signed by Chaplain Dias Mai on 5/7/2025 at 3:55 PM

## 2025-05-07 NOTE — PROGRESS NOTES
Spiritual Health History and Assessment/Progress Note  [facility]    Loneliness/Social Isolation,  ,  , Initial Encounter    Name: Jaye Matamoros MRN: [id number]    Age: 34 y.o.     Sex: female   Language: English   Gnosticist: Sikhism   Mood disorder     Date: 5/7/2025            Total Time Calculated: 14 min              Spiritual Assessment began in [facility] 6 ADULT [facility]        Referral/Consult From: Multi-disciplinary team (Also Consult: Spiritual/emotional distress)   Encounter Overview/Reason: Loneliness/Social Isolation  Service Provided For: Patient    Isabella, Belief, Meaning:   Patient is connected with a isabella tradition or spiritual practice: prayer; expressed belieg that her prayers were \"eventually answered.\"  Family/Friends       Importance and Influence:  Patient has spiritual/personal beliefs that influence decisions regarding their health: expressed that prayer is for peace, be happy again, to \"go past beyond the past\" including past relationship, drugs.  Family/Friends     Community:  Patient feels well-supported. Support system includes: Other: Caregivers, new facility  Family/Friends     Assessment and Plan of Care:   Pt narrated her past as the opposite of what she has prayed for: peace, happy.  Believes in prayer and god who answers \"eventually.\"  Expressed soila and hope for being approved to go to a facility on Friday - for 28 days.    Patient Interventions include: Facilitated expression of thoughts and feelings and Explored spiritual coping/struggle/distress  Family/Friends Interventions include:     Patient Plan of Care: Other: To follow \"after 28 days\" of rehab per interest to be followed up.  Potential problem: no phone.     Family/Friends Plan of Care:     Electronically signed by JOSE Owen on 5/7/2025 at 3:27 PM

## 2025-05-07 NOTE — PROGRESS NOTES
SW gave the pt a list of other rehab facilities to contact about admission, and JANET spoke with [facility] in [place] and they did an assessment with pt over the phone, then SW faxed over her referral to them. The admission staff contacted SW back and said after review, they had to deny pt due to last time she was there, the police had to escort her off the property. Pt's nurse spoke with another facility called [facility], and they said the pt was also escorted by police from their facility as well, so she was denied admission. SW contacted [facility] and the admission staff confirmed that they accept people that are court ordered to rehab, but they would need to pt to call them to complete the referral process over the phone, and that their two requirements were that the pt have [place] medicaid, which she does, and that she has used drugs in the past 30 days, which pt said she has, so now when pt comes out of group, SW will have her contact them to complete phone referral.

## 2025-05-07 NOTE — PROGRESS NOTES
Treatment Team Note:    Therapist met with treatment team to discuss patients treatment, progress toward treatment goals and discharge plans.    Target Symptoms/Reason for admission: Per nursing admission assessment - Reason for Admission: Jaye Matamoros is a 34 yoa  presenting to ER from [facility].  Pt reports increased anxiety from being around all the people in rehab.  Increased anxiety contributed to SI with a plan to stab herself with a pointed fingernail file in her manicure set.  Pt told staff at rehab and was brought here.    Diagnoses per psych provider: Depression with suicidal ideation [F32.A, R45.851]  Mood disorder [F39]  Unspecified mood (affective) disorder [F39]    Patient's aftercare plan is: SW will meet with patient to gather information    Aftercare appointments made: No - SW will make discharge appointments    Pt lives with:  Currently at [facility]    Collateral obtained from: SW will meet with patient to gather information  Collateral obtained on:New admission    Attending groups:  Minimal participation    Behavior: cooperative, affect is exaggerated, rated depression 6/10, relates her anxiety to being \"worried about going back to rehab\", denies SI/HI/AVH.    Has patient been completing ADL's:  Yes    Sleeping:Yes    Taking medication: Yes    Misc:

## 2025-05-07 NOTE — PROGRESS NOTES
Behavioral Services  Medicare Certification Upon Admission    I certify that this patient's inpatient psychiatric hospital admission is medically necessary for:    [x] (1) Treatment which could reasonably be expected to improve this patient's condition,       [x] (2) Or for diagnostic study;     AND     [x](2) The inpatient psychiatric services are provided while the individual is under the care of a physician and are included in the individualized plan of care.    Estimated length of stay/service 3-5 days    Plan for post-hospital care TBD    Electronically signed by ADRIANA WU MD on 5/7/2025 at 7:41 AM

## 2025-05-07 NOTE — PROGRESS NOTES
Administered Excedrin due to the patient complaining of a headache per order. Excedrin administered without difficulty. Will continue to monitor.

## 2025-05-07 NOTE — PSYCHOTHERAPY
Group Therapy Note    Date: 5/7/2025  Start Time: 1330  End Time:  1400  Number of Participants: 3    Type of Group: Relaxation      Status After Intervention:  Improved    Participation Level: Active Listener and Interactive    Participation Quality: Appropriate and Attentive      Speech:  normal      Thought Process/Content: Logical  Linear      Affective Functioning: Congruent      Mood:  Calm      Level of consciousness:  Alert and Oriented x4      Response to Learning: Able to verbalize current knowledge/experience and Able to verbalize/acknowledge new learning        Modes of Intervention: Support      Discipline Responsible: Spiritual Care      Signature:  Larissa Sands  Electronically signed by nida Dias Mai on 5/7/2025 at 4:00 PM

## 2025-05-07 NOTE — PROGRESS NOTES
[facility] Adult Unit Daily Assessment  Nursing Progress Note    [facility] [address] [place] [place] [address]   Name: Jaye Matamoros   Age: 34 y.o.   Gender: female   Dx: Mood disorder  Precautions: suicide risk  Inpatient Status: voluntary     SLEEP:  Sleep Quality Poor  Sleep Medications: Yes   PRN Sleep Meds: Yes     MEDICAL:  Other PRN Meds: Yes   Med Compliant: Yes  Accu-Chek: No  Oxygen/CPAP/BiPAP: No  CIWA/CINA: No   PAIN Assessment: none  Side Effects from medication: No    Metabolic Screening:  Lab Results   Component Value Date    LABA1C 4.9 05/07/2025     Lab Results   Component Value Date    CHOL 160 05/07/2025    CHOL 87 (L) 01/03/2020    CHOL 95 (L) 11/15/2017     Lab Results   Component Value Date    TRIG 148 05/07/2025    TRIG 79 01/03/2020    TRIG 86 11/15/2017     Lab Results   Component Value Date    HDL 44 05/07/2025    HDL 38 (L) 01/03/2020    HDL 30 (L) 11/15/2017     No components found for: \"LDLCAL\"  No components found for: \"LABVLDL\"  Body mass index is 21.88 kg/m².  BP Readings from Last 2 Encounters:   05/07/25 114/78   06/26/23 103/62       Medical Bed:   Is patient in a medical bed? no   If medical bed is in use, has nursing secured room while patient is awake and out of the room? NA  Has safety checks by nursing been completed on the bed/room this shift? NA    Protective Factors:  Patient identifies protective factors with nursing staff as follows:   Identifies reasons for living: Yes   Supportive Social Network or family: Yes    Belief that suicide is immoral/high spirituality: Yes   Responsibility to family or others/living with family: Yes   Fear of death or dying due to pain and suffering: Yes   Engaged in work or school: No  If Patient is unable to identify, reason why?     Depression: 5   Anxiety: 5   SI denies suicidal ideation   Risk of Suicide: No Risk  HI Negative for homicidal ideation        AVH:no If Hallucinations are present, describe?     Appetite: good   Percent Meals: 75%   Social: No   Speech:

## 2025-05-07 NOTE — PROGRESS NOTES
SW spoke with pt about a safe discharge plan for when she is ready to be discharged, and she reports that she came here from [facility] rehab, and plans on returning there when she leaves. SW will contact [facility] today and confirm that the pt can return after discharge. If she is going to return, she will continue receiving therapy and medication management in house at the rehab facility and will not need follow up appointments scheduled. SW will check with [facility] to see if they will provide pt transportation back to them, or if we need to set that up.    electronic

## 2025-05-07 NOTE — PROGRESS NOTES
Patient complaining of a headache and was given Tylenol 650mg this morning but it has not helped. Patient is requesting Excedrin. Will notify Dr. Guerrero.

## 2025-05-08 LAB
25(OH)D3 SERPL-MCNC: 25.2 NG/ML
TSH SERPL DL<=0.005 MIU/L-ACNC: 1.1 UIU/ML (ref 0.27–4.2)
VIT B12 SERPL-MCNC: 643 PG/ML (ref 232–1245)

## 2025-05-08 PROCEDURE — 1240000000 HC EMOTIONAL WELLNESS R&B

## 2025-05-08 PROCEDURE — 6370000000 HC RX 637 (ALT 250 FOR IP): Performed by: PSYCHIATRY & NEUROLOGY

## 2025-05-08 PROCEDURE — 6370000000 HC RX 637 (ALT 250 FOR IP): Performed by: FAMILY MEDICINE

## 2025-05-08 RX ORDER — ERGOCALCIFEROL 1.25 MG/1
50000 CAPSULE, LIQUID FILLED ORAL WEEKLY
Status: DISCONTINUED | OUTPATIENT
Start: 2025-05-08 | End: 2025-05-09 | Stop reason: HOSPADM

## 2025-05-08 RX ADMIN — ACETAMINOPHEN, ASPIRIN, CAFFEINE 1 TABLET: 250; 65; 250 TABLET, FILM COATED ORAL at 08:21

## 2025-05-08 RX ADMIN — HYDROXYZINE HYDROCHLORIDE 25 MG: 25 TABLET, FILM COATED ORAL at 15:08

## 2025-05-08 RX ADMIN — METRONIDAZOLE 500 MG: 500 TABLET ORAL at 20:42

## 2025-05-08 RX ADMIN — DIVALPROEX SODIUM 500 MG: 500 TABLET, FILM COATED, EXTENDED RELEASE ORAL at 08:19

## 2025-05-08 RX ADMIN — MIRTAZAPINE 7.5 MG: 7.5 TABLET, FILM COATED ORAL at 20:42

## 2025-05-08 RX ADMIN — METRONIDAZOLE 500 MG: 500 TABLET ORAL at 08:18

## 2025-05-08 RX ADMIN — ERGOCALCIFEROL 50000 UNITS: 1.25 CAPSULE ORAL at 11:50

## 2025-05-08 RX ADMIN — Medication 5 MG: at 20:42

## 2025-05-08 ASSESSMENT — PAIN SCALES - GENERAL
PAINLEVEL_OUTOF10: 0
PAINLEVEL_OUTOF10: 4

## 2025-05-08 ASSESSMENT — PAIN DESCRIPTION - ORIENTATION: ORIENTATION: MID

## 2025-05-08 ASSESSMENT — PAIN SCALES - WONG BAKER: WONGBAKER_NUMERICALRESPONSE: NO HURT

## 2025-05-08 ASSESSMENT — PAIN DESCRIPTION - LOCATION: LOCATION: HEAD

## 2025-05-08 ASSESSMENT — PAIN DESCRIPTION - DESCRIPTORS: DESCRIPTORS: ACHING

## 2025-05-08 ASSESSMENT — PAIN - FUNCTIONAL ASSESSMENT: PAIN_FUNCTIONAL_ASSESSMENT: ACTIVITIES ARE NOT PREVENTED

## 2025-05-08 NOTE — PROGRESS NOTES
Department of Psychiatry  Progress Note    Chief Complaint: Suicidal ideations      SUBJECTIVE:    34 y.o. female with previous psychiatric history of depression, bipolar disorder, opioid use disorder, stimulant use disorder, synthetic cannabis use disorder, cannabis use disorder, who has been admitted to our psychiatric unit from [facility] rehab facility secondary to suicidal ideations.     Patient has been seen in treatment team room with presence of the patient's nurse.  Patient reported that her condition significantly improved since time of admission to the hospital, stated that her mood is \"better\" today.  She endorses improved appetite and improved quality of sleep during the last night with prescribed psychotropic medications, stated that she did not experience significant difficulties to fall asleep and stay asleep and woke up rested well in the morning.  Patient is compliant with currently prescribed medications and denies any side effects.  She continues to endorse mild feeling of anxiety and mild depression and rated both of them as 2-3 out of 10, with 10 being the worst.  Patient attended a few group activities in the unit and participated in some of those activities.  She is social with medical staff and other patients in the unit.  She performed her ADLs today and took shower in the morning.  Patient denies current active suicidal homicidal ideations, denies any plans.  She denies auditory and visual hallucinations.  Patient did not endorse any delusions or paranoid thoughts.     reported that [facility] rehab facility refused to take patient back.  However, patient was accepted to [facility] rehab facility with day of admission tomorrow on 05/09/2025.      OBJECTIVE    /63   Pulse 74   Temp 98.1 °F (36.7 °C) (Temporal)   Resp 16   Ht 1.549 m (5' 1\") Comment: Stated  Wt 52.5 kg (115 lb 12.8 oz)   LMP  (Within Weeks)   SpO2 97%   BMI 21.88 kg/m²     Review of Systems: 14

## 2025-05-08 NOTE — PROGRESS NOTES
[facility] Adult Unit Daily Assessment  Nursing Progress Note    [facility] [address] [place] [place] [address]   Name: Jaye Matamoros   Age: 34 y.o.   Gender: female   Dx: Mood disorder  Precautions: suicide risk  Inpatient Status: voluntary     SLEEP:  Sleep Quality Good  Sleep Medications: Yes; melatonin 5mg, remeron 7.5mg   PRN Sleep Meds: No     MEDICAL:  Other PRN Meds: Yes; atarax 25mg   Med Compliant: Yes  Accu-Chek: No  Oxygen/CPAP/BiPAP: No  CIWA/CINA: No   PAIN Assessment: denies  Side Effects from medication: none voiced    Metabolic Screening:  Lab Results   Component Value Date    LABA1C 4.9 05/07/2025     Lab Results   Component Value Date    CHOL 160 05/07/2025    CHOL 87 (L) 01/03/2020    CHOL 95 (L) 11/15/2017     Lab Results   Component Value Date    TRIG 148 05/07/2025    TRIG 79 01/03/2020    TRIG 86 11/15/2017     Lab Results   Component Value Date    HDL 44 05/07/2025    HDL 38 (L) 01/03/2020    HDL 30 (L) 11/15/2017     No components found for: \"LDLCAL\"  No components found for: \"LABVLDL\"  Body mass index is 21.88 kg/m².  BP Readings from Last 2 Encounters:   05/07/25 126/79   06/26/23 103/62       Medical Bed:   Is patient in a medical bed? no   If medical bed is in use, has nursing secured room while patient is awake and out of the room? NA  Has safety checks by nursing been completed on the bed/room this shift? yes    Protective Factors:  Patient identifies protective factors with nursing staff as follows:   Identifies reasons for living: Yes   Supportive Social Network or family: Yes    Belief that suicide is immoral/high spirituality: Yes   Responsibility to family or others/living with family: Yes   Fear of death or dying due to pain and suffering: Yes   Engaged in work or school: No  If Patient is unable to identify, reason why? N/A    Depression: 2   Anxiety: 4   SI denies suicidal ideation   Risk of Suicide: No Risk  HI Negative for homicidal ideation        AVH:no If Hallucinations are present, describe?

## 2025-05-08 NOTE — H&P
HISTORY and PHYSICAL      CHIEF COMPLAINT:  SI    Reason for Admission:  SI    History Obtained From:  patient, chart    HISTORY OF PRESENT ILLNESS:      The patient is a 34 y.o. female who is admitted to the [facility] unit with worsening mood issues. She denies any chest pain or SOA. She has had no abdominal pain or N/V. She has had no dysuria. She has had no new pain issues. No fevers.   Past Medical History:        Diagnosis Date    Anxiety     Autism     Bipolar 1 disorder (HCC)     Depression      Past Surgical History:        Procedure Laterality Date    TONSILLECTOMY AND ADENOIDECTOMY           Medications Prior to Admission:    Medications Prior to Admission: mirtazapine (REMERON) 15 MG tablet, Take 0.5 tablets by mouth nightly  divalproex (DEPAKOTE ER) 500 MG extended release tablet, Take 1 tablet by mouth daily Take 500 mg tablet together with 250 mg tablet for total dose of 750 mg  divalproex (DEPAKOTE ER) 250 MG extended release tablet, Take 1 tablet by mouth daily Take 250 mg tablet together with 500 mg tablet for total dose of 750 mg  traZODone (DESYREL) 50 MG tablet, Take 1 tablet by mouth nightly (Patient not taking: Reported on 5/6/2025)  OLANZapine (ZYPREXA) 10 MG tablet, Take 1 tablet by mouth nightly (Patient not taking: Reported on 5/6/2025)  melatonin 5 MG TBDP disintegrating tablet, Take 1 tablet by mouth nightly (Patient not taking: Reported on 5/6/2025)  hydrOXYzine HCl (ATARAX) 25 MG tablet, Take 1 tablet by mouth 3 times daily as needed for Anxiety (Patient not taking: Reported on 5/6/2025)    Allergies:  Amoxicillin and Pcn [penicillins]    Social History:   TOBACCO:   reports that she has been smoking cigarettes. She has a 2 pack-year smoking history. She has never been exposed to tobacco smoke. She has never used smokeless tobacco.  ETOH:   reports current alcohol use.  DRUGS:   reports current drug use. Drug: Methamphetamines

## 2025-05-08 NOTE — PROGRESS NOTES
Group Note    Date: 05/07/25  Start Time: 7:30 PM   End Time:8:00 PM     Number of Participants: 4    Type of Group: Wrap-Up     Patient's Goal:  none listed    Notes:  none listed    Status After Intervention:  Unchanged    Participation Level: Minimal    Participation Quality: Appropriate    Speech:  normal    Thought Process/Content: Logical    Mood: anxious and depressed    Level of consciousness:  Alert and Preoccupied    Response to Learning: Progressing to goal    Modes of Intervention: Education and Support    Discipline Responsible: Registered Nurse     Signature:  SHERYL FLORES RN

## 2025-05-08 NOTE — PROGRESS NOTES
Treatment Team Note:    Therapist met with treatment team to discuss patients treatment, progress toward treatment goals and discharge plans.    Target Symptoms/Reason for admission: Per nursing admission assessment - Reason for Admission: Jaye Matamoros is a 34 yoa  presenting to ER from [facility].  Pt reports increased anxiety from being around all the people in rehab.  Increased anxiety contributed to SI with a plan to stab herself with a pointed fingernail file in her manicure set.  Pt told staff at rehab and was brought here.    Diagnoses per psych provider: Depression with suicidal ideation [F32.A, R45.851]  Mood disorder [F39]  Unspecified mood (affective) disorder [F39]    Patient's aftercare plan is: [facility]    Aftercare appointments made:  Pt will receive therapy and med management in house at the rehab facility     Pt lives with:  is currently homeless    Collateral obtained from:  pt's mom  Collateral obtained on: 5/7/25    Attending groups: Yes    Behavior: cooperative    Has patient been completing ADL's:  Yes    Sleeping:Yes    Taking medication: Yes    Misc: Per night nursing note: Pt was cooperative with her assessment. She was social with peers and staff and spent most of the night in the tv area. Her expressions are bright. She rated her depression 2 and anxiety 4 and denies SI, HI and AVH. Pt reports her day as \"Good- I colored, found a rehab to go to and got [facility] stuff from [facility] Pt Reported good sleep and appetite and was med compliant.

## 2025-05-08 NOTE — PROGRESS NOTES
[facility] Adult Unit Daily Assessment  Nursing Progress Note    Room: [address] [place] [address]   Name: Jaye Matamoros   Age: 34 y.o.   Gender: female   Dx: Mood disorder  Precautions: suicide risk  Inpatient Status: voluntary     SLEEP:  Sleep Quality Good  Sleep Medications: Yes   PRN Sleep Meds: No     MEDICAL:  Other PRN Meds: Yes   Med Compliant: Yes  Accu-Chek: No  Oxygen/CPAP/BiPAP: No  CIWA/CINA: No   PAIN Assessment: headaches  Side Effects from medication: No    Metabolic Screening:  Lab Results   Component Value Date    LABA1C 4.9 05/07/2025     Lab Results   Component Value Date    CHOL 160 05/07/2025    CHOL 87 (L) 01/03/2020    CHOL 95 (L) 11/15/2017     Lab Results   Component Value Date    TRIG 148 05/07/2025    TRIG 79 01/03/2020    TRIG 86 11/15/2017     Lab Results   Component Value Date    HDL 44 05/07/2025    HDL 38 (L) 01/03/2020    HDL 30 (L) 11/15/2017     No components found for: \"LDLCAL\"  No components found for: \"LABVLDL\"  Body mass index is 21.88 kg/m².  BP Readings from Last 2 Encounters:   05/08/25 101/63   06/26/23 103/62       Medical Bed:   Is patient in a medical bed? no   If medical bed is in use, has nursing secured room while patient is awake and out of the room? NA  Has safety checks by nursing been completed on the bed/room this shift? yes    Protective Factors:  Patient identifies protective factors with nursing staff as follows:   Identifies reasons for living: Yes   Supportive Social Network or family: Yes    Belief that suicide is immoral/high spirituality: Yes   Responsibility to family or others/living with family: Yes   Fear of death or dying due to pain and suffering: Yes   Engaged in work or school: No  If Patient is unable to identify, reason why?     Depression: 5   Anxiety: 5   SI denies suicidal ideation   Risk of Suicide: No Risk  HI Negative for homicidal ideation        AVH:no If Hallucinations are present, describe?     Appetite: good   Percent Meals: 75%   Social: Yes   Speech:

## 2025-05-09 VITALS
HEIGHT: 61 IN | WEIGHT: 115.8 LBS | BODY MASS INDEX: 21.86 KG/M2 | RESPIRATION RATE: 18 BRPM | DIASTOLIC BLOOD PRESSURE: 83 MMHG | OXYGEN SATURATION: 99 % | SYSTOLIC BLOOD PRESSURE: 114 MMHG | HEART RATE: 86 BPM | TEMPERATURE: 98.1 F

## 2025-05-09 PROCEDURE — 6370000000 HC RX 637 (ALT 250 FOR IP): Performed by: FAMILY MEDICINE

## 2025-05-09 PROCEDURE — 5130000000 HC BRIDGE APPOINTMENT

## 2025-05-09 PROCEDURE — 6370000000 HC RX 637 (ALT 250 FOR IP): Performed by: PSYCHIATRY & NEUROLOGY

## 2025-05-09 RX ORDER — DIVALPROEX SODIUM 500 MG/1
500 TABLET, FILM COATED, EXTENDED RELEASE ORAL DAILY
Qty: 30 TABLET | Refills: 0 | Status: SHIPPED | OUTPATIENT
Start: 2025-05-09

## 2025-05-09 RX ORDER — MIRTAZAPINE 15 MG/1
7.5 TABLET, FILM COATED ORAL NIGHTLY
Qty: 15 TABLET | Refills: 0 | Status: SHIPPED | OUTPATIENT
Start: 2025-05-09

## 2025-05-09 RX ORDER — METRONIDAZOLE 500 MG/1
500 TABLET ORAL EVERY 12 HOURS SCHEDULED
Qty: 12 TABLET | Refills: 0 | Status: SHIPPED | OUTPATIENT
Start: 2025-05-09 | End: 2025-05-15

## 2025-05-09 RX ORDER — ERGOCALCIFEROL 1.25 MG/1
50000 CAPSULE ORAL WEEKLY
Qty: 5 CAPSULE | Refills: 0 | Status: SHIPPED | OUTPATIENT
Start: 2025-05-15

## 2025-05-09 RX ADMIN — ACETAMINOPHEN, ASPIRIN, CAFFEINE 1 TABLET: 250; 65; 250 TABLET, FILM COATED ORAL at 08:24

## 2025-05-09 RX ADMIN — METRONIDAZOLE 500 MG: 500 TABLET ORAL at 08:12

## 2025-05-09 RX ADMIN — DIVALPROEX SODIUM 500 MG: 500 TABLET, FILM COATED, EXTENDED RELEASE ORAL at 08:12

## 2025-05-09 ASSESSMENT — PAIN SCALES - GENERAL: PAINLEVEL_OUTOF10: 5

## 2025-05-09 ASSESSMENT — PAIN DESCRIPTION - DESCRIPTORS: DESCRIPTORS: ACHING

## 2025-05-09 ASSESSMENT — PAIN - FUNCTIONAL ASSESSMENT: PAIN_FUNCTIONAL_ASSESSMENT: ACTIVITIES ARE NOT PREVENTED

## 2025-05-09 ASSESSMENT — PAIN DESCRIPTION - LOCATION: LOCATION: HEAD

## 2025-05-09 NOTE — PROGRESS NOTES
CLINICAL PHARMACY NOTE: MEDS TO BEDS    Total # of Prescriptions Filled: 4   The following medications were delivered to the patient:  Discharge Medication List as of 5/9/2025  9:01 AM        START taking these medications    Details   Ergocalciferol (VITAMIN D) 84981 units CAPS Take 50,000 Units by mouth once a week, Disp-5 capsule, R-0Normal      metroNIDAZOLE (FLAGYL) 500 MG tablet Take 1 tablet by mouth every 12 hours for 12 doses, Disp-12 tablet, R-0Normal      aspirin-acetaminophen-caffeine (EXCEDRIN MIGRAINE) 250-250-65 MG per tablet Take 1 tablet by mouth every 8 hours as needed for Headaches, Disp-30 tablet, R-0Normal         Mirtazapine 15mg Take 0.5 tablet by mouth nightly dispense qty: 15  Divalproex ER 500mg Take 1 tablet by mouth once a day dispense qty: 30      Additional Documentation:    We did not fill the Excedrin Migraine since it is over the counter.   Handed scripts to Jagjit CANELA) at nurses station. Zero copay.

## 2025-05-09 NOTE — PROGRESS NOTES
[facility] Adult Unit Daily Assessment  Nursing Progress Note    Room: [address] [place] [address]   Name: Jaye Matamoros   Age: 34 y.o.   Gender: female   Dx: Mood disorder  Precautions: suicide risk  Inpatient Status: voluntary     SLEEP:  Sleep Quality Poor  Sleep Medications: Yes   PRN Sleep Meds: No     MEDICAL:  Other PRN Meds: No   Med Compliant: Yes  Accu-Chek: No  Oxygen/CPAP/BiPAP: No  CIWA/CINA: No   PAIN Assessment: none  Side Effects from medication: No    Metabolic Screening:  Lab Results   Component Value Date    LABA1C 4.9 05/07/2025     Lab Results   Component Value Date    CHOL 160 05/07/2025    CHOL 87 (L) 01/03/2020    CHOL 95 (L) 11/15/2017     Lab Results   Component Value Date    TRIG 148 05/07/2025    TRIG 79 01/03/2020    TRIG 86 11/15/2017     Lab Results   Component Value Date    HDL 44 05/07/2025    HDL 38 (L) 01/03/2020    HDL 30 (L) 11/15/2017     No components found for: \"LDLCAL\"  No components found for: \"LABVLDL\"  Body mass index is 21.88 kg/m².  BP Readings from Last 2 Encounters:   05/08/25 121/67   06/26/23 103/62       Medical Bed:   Is patient in a medical bed? no   If medical bed is in use, has nursing secured room while patient is awake and out of the room? NA  Has safety checks by nursing been completed on the bed/room this shift? yes    Protective Factors:  Patient identifies protective factors with nursing staff as follows:   Identifies reasons for living: Yes   Supportive Social Network or family: Yes    Belief that suicide is immoral/high spirituality: Yes   Responsibility to family or others/living with family: Yes   Fear of death or dying due to pain and suffering: Yes   Engaged in work or school: No  If Patient is unable to identify, reason why?     Depression: 0   Anxiety: 0   SI denies suicidal ideation   Risk of Suicide: No Risk  HI Negative for homicidal ideation        AVH:no If Hallucinations are present, describe?     Appetite: good   Percent Meals: have not seen patient consume

## 2025-05-09 NOTE — PROGRESS NOTES
Group Therapy Note    Date: 5/9/2025  Start Time: 0800  End Time:  0830  Number of Participants: 8    Type of Group: Community Meeting    Wellness Binder Information  Module Name:  self inventory  Session Number:  1    Patient's Goal:  Staying sober     Status After Intervention:  Unchanged    Participation Level: Active Listener    Participation Quality: Appropriate      Speech:  normal      Thought Process/Content: Logical      Affective Functioning: Congruent      Mood: calm      Level of consciousness:  Alert      Response to Learning: Able to verbalize current knowledge/experience      Endings: None Reported    Modes of Intervention: Support and Exploration      Discipline Responsible: Registered Nurse      Signature:  Kailash Barros RN

## 2025-05-09 NOTE — PROGRESS NOTES
Treatment Team Note:    Therapist met with treatment team to discuss patients treatment, progress toward treatment goals and discharge plans.    Target Symptoms/Reason for admission: Per nursing admission assessment - Reason for Admission: Jaye Matamoros is a 34 yoa  presenting to ER from [facility].  Pt reports increased anxiety from being around all the people in rehab.  Increased anxiety contributed to SI with a plan to stab herself with a pointed fingernail file in her manicure set.  Pt told staff at rehab and was brought here.    Diagnoses per psych provider: Depression with suicidal ideation [F32.A, R45.851]  Mood disorder [F39]  Unspecified mood (affective) disorder [F39]    Patient's aftercare plan is: [facility]    Aftercare appointments made:  Pt will receive therapy and medication management in house at rehab    Pt lives with:  going to [facility]    Collateral obtained from:  pt's mom  Collateral obtained on: 5/7/25    Attending groups: Yes    Behavior: social    Has patient been completing ADL's:   poor hygiene per night nursing note    Sleeping:No, slept poor per night nursing note    Taking medication: Yes    Misc: Per night nursing note: Patient is alert, cooperative, and compliant with medications. She is social with peers and intrusive at time. Patient is hyper-verbal and speech is loud and pressured. She denies depression, anxiety, SI, HI, and AVH. She reports interrupted and non-restorative sleep. Patient participated in  Wrap-Up group and reports good appetite. She states her mood is \"good\" and that she is feeling hopeful about going to a new rehab and believes that this facility will better fit her needs.

## 2025-05-09 NOTE — PROGRESS NOTES
SW spoke with pt about her safe discharge plan since she is leaving the hospital today, and she will go to [facility] for substance abuse court ordered rehab, and their staff will be picking up the pt upon her discharge today. SW reminded pt that she does not have follow up appointments scheduled because she will receive therapy and medication management in house at the rehab facility.

## 2025-05-09 NOTE — PROGRESS NOTES
Progress Note  Jaye Matamoros  5/9/2025 7:28 AM  Subjective:   Admit Date:   5/6/2025      CC/ADMIT DX:       Interval History:   Reviewed overnight events and nursing notes. She denies any new physical complaints.     I have reviewed all labs/diagnostics from the last 24hrs.       ROS:   I have done a 10 point ROS and all are negative, except what is mentioned in the HPI.    ADULT DIET; Regular; Safety Tray; Safety Tray (Disposables)  ADULT ORAL NUTRITION SUPPLEMENT; Breakfast, Lunch, Dinner; Standard High Calorie/High Protein Oral Supplement    Medications:      vitamin D  50,000 Units Oral Weekly    divalproex  500 mg Oral Daily    metroNIDAZOLE  500 mg Oral 2 times per day    mirtazapine  7.5 mg Oral Nightly    melatonin  5 mg Oral Nightly    nicotine  1 patch TransDERmal Daily             Objective:   Vitals: /83   Pulse 86   Temp 98.1 °F (36.7 °C)   Resp 18   Ht 1.549 m (5' 1\") Comment: Stated  Wt 52.5 kg (115 lb 12.8 oz)   LMP  (Within Weeks)   SpO2 99%   BMI 21.88 kg/m²  No intake or output data in the 24 hours ending 05/09/25 0728  General appearance: alert and cooperative with exam, in bed  Extremities: extremities normal, atraumatic, no cyanosis or edema  Neurologic:  No obvious focal neurologic deficits.  Skin: no rashes    Assessment and Plan:   Principal Problem:    Mood disorder  Active Problems:    Unspecified mood (affective) disorder  Resolved Problems:    * No resolved hospital problems. *    Trichomonas    Plan:   Continue present medication(s)    D/W pt Trichomonas + and treatmentordered   Follow with Psych      Discharge planning:   her home     Reviewed treatment plans with the patient and/or family.             Electronically signed by Jacinto Guerrero MD on 5/9/2025 at 7:28 AM

## 2025-05-09 NOTE — PROGRESS NOTES
Discharge Note     Patient is discharging on this date. Patient denies SI, HI, and AVH at this time. Patient reports improvement in behavior and is leaving unit in overall good condition. SW and patient discussed patient's follow up appointments and importance of attending appointments as scheduled, patient voiced understanding and agreement. Patient and SW also discussed patient's safety plan and patient was able to verbally identify: warning signs, coping strategies, places and people that help make the patient feel better/distract negative thoughts, friends/family/agencies/professionals the patient can reach out to in a crisis, and something that is important to the patient/worth living for. Patient was provided the national suicide prevention hotline number (1-265.154.6362) as well as local community behavioral health ([facility]) crisis number for emergencies (1-386.136.4143).     Discharge Disposition: Drug rehabilitation facility - [facility]      Pt to follow up by receiving therapy and medication management in house at the rehab facility.     Referral to outpatient tobacco cessation counseling treatment:  Patient refused referral to outpatient tobacco cessation counseling    SW offered to assist patient with transportation, patient declined transportation assistance, the staff at [facility] will be coming to  pt today and transport her to their facility.

## 2025-05-09 NOTE — PLAN OF CARE
Group Therapy Note    Date: 5/7/2025  Start Time: 0930  End Time:  1000  Number of Participants: 7    Type of Group: Psychoeducation    Wellness Binder Information  Module Name:  Mental Health Wellness  Session Number:  1    Group Goal for Pt:  To improve knowledge of practical facts about depression    Notes:  Pt did not attend group activity.  Pt was invited/encouraged.    Status After Intervention:      Participation Level:     Participation Quality:       Speech:        Thought Process/Content:       Affective Functioning:       Mood:       Level of consciousness:        Response to Learning:       Endings:     Modes of Intervention:       Discipline Responsible:       Signature:  Gina Pillai    
                                                                    Group Therapy Note    Date: 5/7/2025  Start Time: 1030  End Time:  1100  Number of Participants: 3    Type of Group: Healthy Living/Wellness    Wellness Binder Information  Module Name:  Women's Issues  Session Number:  4    Group Goal for Pt:  To raise awareness of how thoughts influence feelings    Notes:  Pt did not attend group discussion.  Pt was invited/encouraged.    Status After Intervention:      Participation Level:     Participation Quality:       Speech:        Thought Process/Content:       Affective Functioning:       Mood:       Level of consciousness:        Response to Learning:       Endings:     Modes of Intervention:       Discipline Responsible:       Signature:  Gina Pillai    
  Problem: Coping  Goal: Pt/Family able to verbalize concerns and demonstrate effective coping strategies  Description: INTERVENTIONS:1. Assist patient/family to identify coping skills, available support systems and cultural and spiritual values2. Provide emotional support, including active listening and acknowledgement of concerns of patient and caregivers3. Reduce environmental stimuli, as able4. Instruct patient/family in relaxation techniques, as appropriate5. Assess for spiritual pain/suffering and initiate Spiritual Care, Psychosocial Clinical Specialist consults as needed  Outcome: Adequate for Discharge                                                                     Group Note    Date: 05/09/25  Start Time: 10:00 AM   End Time:10:40 AM     Number of Participants: 3    Type of Group:  Psychoeducation      Patient's Goal:  Mental Health Maintenance Plan    Notes: Pt came up with a personal mental health maintenance plan, which included what triggers their mental health, what warning signs they notice in themselves, self-care activities they want to include in their daily life moving forward, and coping strategies they hope to implement after discharge. Pt shared that she is planning to go to rehab and get her life back together.    Status After Intervention:  Improved    Participation Level: Active Listener and Interactive    Participation Quality: Appropriate, Attentive, and Sharing    Speech:  normal    Thought Process/Content: Logical    Mood: euthymic    Level of consciousness:  Alert, Oriented x4, and Attentive    Response to Learning: Able to verbalize current knowledge/experience and Able to verbalize/acknowledge new learning    Modes of Intervention: Education and Support    Discipline Responsible: Psychoeducational Specialist     Signature: Yamile Middleton  
  Problem: Self Harm/Suicidality  Goal: Will have no self-injury during hospital stay  Description: INTERVENTIONS:1.  Ensure constant observer at bedside with Q15M safety checks2.  Maintain a safe environment3.  Secure patient belongings4.  Ensure family/visitors adhere to safety recommendations5.  Ensure safety tray has been added to patient's diet order6.  Every shift and PRN: Re-assess suicidal risk via Frequent Screener  INTERVENTIONS:1.  Ensure constant observer at bedside with Q15M safety checks2.  Maintain a safe environment3.  Secure patient belongings4.  Ensure family/visitors adhere to safety recommendations5.  Ensure safety tray has been added to patient's diet order6.  Every shift and PRN: Re-assess suicidal risk via Frequent Screener  Outcome: Adequate for Discharge     Problem: Anxiety  Goal: Will report anxiety at manageable levels  Description: INTERVENTIONS:1. Administer medication as ordered2. Teach and rehearse alternative coping skills3. Provide emotional support with 1:1 interaction with staff  Outcome: Adequate for Discharge     Problem: Decision Making  Goal: Pt/Family able to effectively weigh alternatives and participate in decision making related to treatment and care  Description: INTERVENTIONS:1. Determine when there are differences between patient's view, family's view, and healthcare provider's view of condition2. Facilitate patient and family articulation of goals for care3. Help patient and family identify pros/cons of alternative solutions4. Provide information as requested by patient/family5. Respect patient/family right to receive or not to receive information6. Serve as a liaison between patient and family and health care team7. Initiate Consults from Ethics, Palliative Care or initiate Family Care Conference as is appropriate  Outcome: Adequate for Discharge     Problem: Depression/Self Harm  Goal: Effect of psychiatric condition will be minimized and patient will be protected 
changing drug-related behavior  Description: INTERVENTIONS:1. Administer medication as ordered2. Monitor physical status3. Provide emotional support with 1:1 interaction with staff4. Encourage  recovery focused treatment   Outcome: Progressing  Flowsheets (Taken 5/7/2025 1142)  Will have no detox symptoms and will verbalize plan for changing drug-related behavior:   Administer medication as ordered   Provide emotional support with 1:1 interaction with staff   Monitor physical status     Problem: Spiritual Care  Goal: Pt/Family able to move forward in process of forgiving self, others, and/or higher power  Description: INTERVENTIONS:1. Assist patient/family to overcome blocks to healing by use of spiritual practices (prayer, meditation, guided imagery, reiki, breath work, etc).2. De-myth guilt and help patient/family identify possible irrational spiritual/cultural beliefs and values.3. Explore possibilities of making amends & reconciliation with self, others, and/or a greater power.4. Guide patient/family in identifying painful feelings of guilt.5. Help patient/famiy explore and identify spiritual beliefs, cultural understandings or values that may help or hinder letting go of issue.6. Help patient/family explore feelings of anger, bitterness, resentment.7. Help patient/family identify and examine the situation in which these feelings are experienced.8. Help patient/family identify destructive displacement of feelings onto other individuals.9. Invite use of sacraments/rituals/ceremonies as appropriate (e.g. - confession, anointing, smudging).10. Refer patient/family to formal counseling and/or to selvin community for further support work.  Outcome: Progressing  Flowsheets (Taken 5/7/2025 1142)  Patient/family able to move forward in process of forgiving self, others, and/or higher power: Assist patient to overcome blocks to healing by use of spiritual practices (prayer, meditation, guided imagery, reiki, breath work,

## 2025-05-09 NOTE — DISCHARGE INSTR - DIET

## 2025-05-09 NOTE — BH NOTE
Group Note    Date: 05/07/25  Start Time: 8:00 AM   End Time:8:30 AM     Number of Participants: 9    Type of Group: Community/Goal     Patient's Goal:  \"staying positive\"     Notes:  Pt reports depression and anxiety are the same on this date.     Status After Intervention:      Participation Level: Active Listener    Participation Quality: Appropriate    Speech:  normal    Thought Process/Content: Logical    Mood: Calm     Level of consciousness:  Alert    Response to Learning: Capable of insight    Modes of Intervention: Education and Support    Discipline Responsible: Behavioral Health Technician     Signature:  Willian Miles  
                                                                Group Note    Date: 05/08/25  Start Time: 8:00 AM   End Time:8:30 AM     Number of Participants: 7    Type of Group: Community/Goal     Patient's Goal:  \"go to group\"    Notes:  Pt reports anxiety is the same, but depression is improving.     Status After Intervention:      Participation Level: Active Listener    Participation Quality: Appropriate    Speech:  normal    Thought Process/Content: Logical    Mood: Calm     Level of consciousness:  Alert    Response to Learning: Able to verbalize current knowledge/experience    Modes of Intervention: Education and Support    Discipline Responsible: Behavioral Health Technician     Signature:  Willian Miles  
  HI? {MS AMB PSYCH POSITIVE/NEGATIVE FOR HOMICIDAL IDEATION:21411}    AVH? {HALLUCINATIONS:90054}    Status EXAM upon discharge:  Mental Status and Behavioral Exam  Normal: No  Level of Assistance: Independent/Self  Facial Expression: Brightened, Elevated, Exaggerated  Affect: Congruent  Level of Consciousness: Alert  Frequency of Checks: 4 times per hour, close  Mood:Normal: No  Mood: Elated  Motor Activity:Normal: Yes  Eye Contact: Good  Observed Behavior: Friendly, Cooperative, Impulsive  Sexual Misconduct History: Current - no  Preception: [employer] to person, [employer] to time, [employer] to place, [employer] to situation  Attention:Normal: No  Attention: Distractible, Unable to concentrate  Thought Processes: Circumstantial  Thought Content:Normal: No  Thought Content: Preoccupations  Depression Symptoms: Impaired concentration  Anxiety Symptoms: No problems reported or observed.  Ludy Symptoms: Increased energy, Poor judgment  Hallucinations: None  Delusions: No  Memory:Normal: No  Memory: Poor recent  Insight and Judgment: No  Insight and Judgment: Poor judgment, Poor insight, Unrealistic    AVS/Transition Record has been discussed with patient and a copy was given to the patient.  The AVS/Transition Record was faxed to the next level of care today.

## 2025-05-09 NOTE — PROGRESS NOTES
Group Note    Date: 05/08/25  Start Time: 7:30 PM   End Time:8:00 PM     Number of Participants: 4    Type of Group: Wrap-Up     Patient's Goal:  None listed    Notes:  See Wrap-Up sheet    Status After Intervention:  Improved    Participation Level: Active Listener    Participation Quality: Appropriate    Speech:  loud    Thought Process/Content: Logical    Mood:  calm    Level of consciousness:  Alert    Response to Learning: Able to verbalize current knowledge/experience, Capable of insight, and Progressing to goal    Modes of Intervention: Education and Support    Discipline Responsible: Registered Nurse     Signature:  Iliana Rico RN

## 2025-05-09 NOTE — DISCHARGE SUMMARY
Discharge Summary     Patient ID:  Jaye Matamoros  [id number]  34 y.o.  1990    Admit date: 5/6/2025  Discharge date: 5/9/2025    Admitting Physician: Adriana Calabrese MD   Attending Physician: Adriana Calabrese MD  Discharge Provider: ADRIANA CALABRESE MD     Chief Complaint: Suicidal ideations    Admission Diagnoses: Depression with suicidal ideation [F32.A, R45.851]  Mood disorder [F39]  Unspecified mood (affective) disorder [F39]    Discharge Diagnoses: Mood disorder, unspecified  Medical history of bipolar disorder  Stimulants use disorder, severe  Cannabis use disorder, severe  Synthetic cannabinoids use disorder, severe  Opioid use disorder, severe in sustained remission  Tobacco use disorder  Treatment noncompliance  Migraine headache  UTI / STD  Vitamin D deficiency     Admission Condition: poor    Discharged Condition: stable    Indication for Admission: Suicidal ideations    HPI:    The patient is a 34 y.o. female with previous psychiatric history of depression, bipolar disorder, opioid use disorder, stimulant use disorder, synthetic cannabis use disorder, cannabis use disorder, who has been admitted to our psychiatric unit from [facility] rehab facility secondary to suicidal ideations.     Patient is well-known to psychiatry due to multiple previous admissions to our psychiatric unit secondary to drugs intoxication.  During the previous admissions patient declined to consider any psychotropic medications for her mental health, as well as declined to be provided with referral to rehab facilities for drug use disorder.  Last time patient was admitted to our psychiatric unit in June 2023.     Patient has been seen in treatment team room with presence of the patient's nurse.  Patient reported that she is court ordered to get treatment in rehab facility and she was admitted to [facility] rehab facility 1 week ago.  However, she stated that yesterday she woke up in the morning and she felt suicidal. She talked to

## 2025-05-10 ENCOUNTER — HOSPITAL ENCOUNTER (EMERGENCY)
Age: 35
Discharge: PSYCHIATRIC OR SUBSTANCE USE FACILITY | End: 2025-05-10
Attending: STUDENT IN AN ORGANIZED HEALTH CARE EDUCATION/TRAINING PROGRAM
Payer: MEDICAID

## 2025-05-10 VITALS
RESPIRATION RATE: 15 BRPM | DIASTOLIC BLOOD PRESSURE: 79 MMHG | HEART RATE: 77 BPM | TEMPERATURE: 97.5 F | SYSTOLIC BLOOD PRESSURE: 128 MMHG | OXYGEN SATURATION: 97 %

## 2025-05-10 DIAGNOSIS — F48.9 MENTAL HEALTH PROBLEM: ICD-10-CM

## 2025-05-10 DIAGNOSIS — R45.4 DIFFICULTY CONTROLLING ANGER: ICD-10-CM

## 2025-05-10 DIAGNOSIS — F60.3 EMOTIONAL INSTABILITY (HCC): Primary | ICD-10-CM

## 2025-05-10 LAB
ALBUMIN SERPL-MCNC: 4.2 G/DL (ref 3.5–5.2)
ALP SERPL-CCNC: 142 U/L (ref 35–104)
ALT SERPL-CCNC: 159 U/L (ref 10–35)
AMPHET UR QL SCN: NEGATIVE
ANION GAP SERPL CALCULATED.3IONS-SCNC: 11 MMOL/L (ref 8–16)
AST SERPL-CCNC: 71 U/L (ref 10–35)
BACTERIA URNS QL MICRO: NEGATIVE /HPF
BARBITURATES UR QL SCN: NEGATIVE
BASOPHILS # BLD: 0 K/UL (ref 0–0.2)
BASOPHILS NFR BLD: 0.4 % (ref 0–1)
BENZODIAZ UR QL SCN: NEGATIVE
BILIRUB SERPL-MCNC: 0.2 MG/DL (ref 0.2–1.2)
BILIRUB UR QL STRIP: NEGATIVE
BUN SERPL-MCNC: 20 MG/DL (ref 6–20)
BUPRENORPHINE URINE: NEGATIVE
CALCIUM SERPL-MCNC: 9.5 MG/DL (ref 8.6–10)
CANNABINOIDS UR QL SCN: POSITIVE
CHLORIDE SERPL-SCNC: 106 MMOL/L (ref 98–107)
CLARITY UR: CLEAR
CO2 SERPL-SCNC: 24 MMOL/L (ref 22–29)
COCAINE UR QL SCN: NEGATIVE
COLOR UR: YELLOW
CREAT SERPL-MCNC: 0.6 MG/DL (ref 0.5–0.9)
CRYSTALS URNS MICRO: ABNORMAL /HPF
DRUG SCREEN COMMENT UR-IMP: ABNORMAL
EOSINOPHIL # BLD: 0.4 K/UL (ref 0–0.6)
EOSINOPHIL NFR BLD: 6.3 % (ref 0–5)
EPI CELLS #/AREA URNS AUTO: 13 /HPF (ref 0–5)
ERYTHROCYTE [DISTWIDTH] IN BLOOD BY AUTOMATED COUNT: 13.1 % (ref 11.5–14.5)
ETHANOLAMINE SERPL-MCNC: <11 MG/DL (ref 0–11)
FENTANYL SCREEN, URINE: NEGATIVE
GLUCOSE SERPL-MCNC: 95 MG/DL (ref 70–99)
GLUCOSE UR STRIP.AUTO-MCNC: NEGATIVE MG/DL
HCG UR QL: NEGATIVE
HCT VFR BLD AUTO: 41 % (ref 37–47)
HGB BLD-MCNC: 13.3 G/DL (ref 12–16)
HGB UR STRIP.AUTO-MCNC: NEGATIVE MG/L
HYALINE CASTS #/AREA URNS AUTO: 2 /HPF (ref 0–8)
IMM GRANULOCYTES # BLD: 0 K/UL
KETONES UR STRIP.AUTO-MCNC: ABNORMAL MG/DL
LEUKOCYTE ESTERASE UR QL STRIP.AUTO: ABNORMAL
LYMPHOCYTES # BLD: 1.8 K/UL (ref 1.1–4.5)
LYMPHOCYTES NFR BLD: 32 % (ref 20–40)
MCH RBC QN AUTO: 32.4 PG (ref 27–31)
MCHC RBC AUTO-ENTMCNC: 32.4 G/DL (ref 33–37)
MCV RBC AUTO: 99.8 FL (ref 81–99)
METHADONE UR QL SCN: NEGATIVE
METHAMPHETAMINE, URINE: NEGATIVE
MONOCYTES # BLD: 0.5 K/UL (ref 0–0.9)
MONOCYTES NFR BLD: 9.2 % (ref 0–10)
NEUTROPHILS # BLD: 2.9 K/UL (ref 1.5–7.5)
NEUTS SEG NFR BLD: 51.9 % (ref 50–65)
NITRITE UR QL STRIP.AUTO: NEGATIVE
OPIATES UR QL SCN: NEGATIVE
OXYCODONE UR QL SCN: NEGATIVE
PCP UR QL SCN: NEGATIVE
PH UR STRIP.AUTO: 7.5 [PH] (ref 5–8)
PLATELET # BLD AUTO: 243 K/UL (ref 130–400)
PMV BLD AUTO: 9.2 FL (ref 9.4–12.3)
POTASSIUM SERPL-SCNC: 3.8 MMOL/L (ref 3.5–5)
PROT SERPL-MCNC: 7.3 G/DL (ref 6.4–8.3)
PROT UR STRIP.AUTO-MCNC: NEGATIVE MG/DL
RBC # BLD AUTO: 4.11 M/UL (ref 4.2–5.4)
RBC #/AREA URNS AUTO: 1 /HPF (ref 0–4)
SARS-COV-2 RDRP RESP QL NAA+PROBE: NOT DETECTED
SODIUM SERPL-SCNC: 141 MMOL/L (ref 136–145)
SP GR UR STRIP.AUTO: 1.02 (ref 1–1.03)
TRICYCLIC ANTIDEPRESSANTS, UR: NEGATIVE
UROBILINOGEN UR STRIP.AUTO-MCNC: 0.2 E.U./DL
VALPROATE SERPL-MCNC: 42 UG/ML (ref 50–100)
WBC # BLD AUTO: 5.5 K/UL (ref 4.8–10.8)
WBC #/AREA URNS AUTO: 6 /HPF (ref 0–5)

## 2025-05-10 PROCEDURE — 80053 COMPREHEN METABOLIC PANEL: CPT

## 2025-05-10 PROCEDURE — 6360000002 HC RX W HCPCS: Performed by: PSYCHIATRY & NEUROLOGY

## 2025-05-10 PROCEDURE — 87635 SARS-COV-2 COVID-19 AMP PRB: CPT

## 2025-05-10 PROCEDURE — 99285 EMERGENCY DEPT VISIT HI MDM: CPT

## 2025-05-10 PROCEDURE — 82077 ASSAY SPEC XCP UR&BREATH IA: CPT

## 2025-05-10 PROCEDURE — 84703 CHORIONIC GONADOTROPIN ASSAY: CPT

## 2025-05-10 PROCEDURE — 85025 COMPLETE CBC W/AUTO DIFF WBC: CPT

## 2025-05-10 PROCEDURE — 80307 DRUG TEST PRSMV CHEM ANLYZR: CPT

## 2025-05-10 PROCEDURE — 96372 THER/PROPH/DIAG INJ SC/IM: CPT

## 2025-05-10 PROCEDURE — 36415 COLL VENOUS BLD VENIPUNCTURE: CPT

## 2025-05-10 PROCEDURE — 81001 URINALYSIS AUTO W/SCOPE: CPT

## 2025-05-10 PROCEDURE — 80164 ASSAY DIPROPYLACETIC ACD TOT: CPT

## 2025-05-10 PROCEDURE — G0480 DRUG TEST DEF 1-7 CLASSES: HCPCS

## 2025-05-10 RX ORDER — HALOPERIDOL 5 MG/ML
5 INJECTION INTRAMUSCULAR ONCE
Status: COMPLETED | OUTPATIENT
Start: 2025-05-10 | End: 2025-05-10

## 2025-05-10 RX ORDER — LORAZEPAM 2 MG/ML
2 INJECTION INTRAMUSCULAR ONCE
Status: COMPLETED | OUTPATIENT
Start: 2025-05-10 | End: 2025-05-10

## 2025-05-10 RX ORDER — HALOPERIDOL 5 MG/ML
INJECTION INTRAMUSCULAR
Status: DISCONTINUED
Start: 2025-05-10 | End: 2025-05-10 | Stop reason: HOSPADM

## 2025-05-10 RX ORDER — LORAZEPAM 2 MG/ML
INJECTION INTRAMUSCULAR
Status: DISCONTINUED
Start: 2025-05-10 | End: 2025-05-10 | Stop reason: HOSPADM

## 2025-05-10 RX ADMIN — HALOPERIDOL LACTATE 5 MG: 5 INJECTION, SOLUTION INTRAMUSCULAR at 14:00

## 2025-05-10 RX ADMIN — Medication 2 MG: at 13:59

## 2025-05-10 ASSESSMENT — PATIENT HEALTH QUESTIONNAIRE - PHQ9
SUM OF ALL RESPONSES TO PHQ QUESTIONS 1-9: 12
2. FEELING DOWN, DEPRESSED OR HOPELESS: NEARLY EVERY DAY
3. TROUBLE FALLING OR STAYING ASLEEP: MORE THAN HALF THE DAYS
SUM OF ALL RESPONSES TO PHQ QUESTIONS 1-9: 12
8. MOVING OR SPEAKING SO SLOWLY THAT OTHER PEOPLE COULD HAVE NOTICED. OR THE OPPOSITE, BEING SO FIGETY OR RESTLESS THAT YOU HAVE BEEN MOVING AROUND A LOT MORE THAN USUAL: SEVERAL DAYS
1. LITTLE INTEREST OR PLEASURE IN DOING THINGS: SEVERAL DAYS
4. FEELING TIRED OR HAVING LITTLE ENERGY: NOT AT ALL
7. TROUBLE CONCENTRATING ON THINGS, SUCH AS READING THE NEWSPAPER OR WATCHING TELEVISION: SEVERAL DAYS
5. POOR APPETITE OR OVEREATING: NOT AT ALL
9. THOUGHTS THAT YOU WOULD BE BETTER OFF DEAD, OR OF HURTING YOURSELF: SEVERAL DAYS
SUM OF ALL RESPONSES TO PHQ QUESTIONS 1-9: 12
10. IF YOU CHECKED OFF ANY PROBLEMS, HOW DIFFICULT HAVE THESE PROBLEMS MADE IT FOR YOU TO DO YOUR WORK, TAKE CARE OF THINGS AT HOME, OR GET ALONG WITH OTHER PEOPLE: VERY DIFFICULT
SUM OF ALL RESPONSES TO PHQ QUESTIONS 1-9: 11
6. FEELING BAD ABOUT YOURSELF - OR THAT YOU ARE A FAILURE OR HAVE LET YOURSELF OR YOUR FAMILY DOWN: NEARLY EVERY DAY

## 2025-05-10 ASSESSMENT — ENCOUNTER SYMPTOMS
NAUSEA: 0
CHEST TIGHTNESS: 0
VOMITING: 0
DIARRHEA: 0
EYE REDNESS: 0
COUGH: 0
EYE PAIN: 0
SORE THROAT: 0
SHORTNESS OF BREATH: 0
ABDOMINAL PAIN: 0

## 2025-05-10 NOTE — PROGRESS NOTES
[facility] ADULT INITIAL INTAKE ASSESSMENT     5/10/25    Jaye Matamoros ,a 34 y.o. female, presents to the ED for a psychiatric assessment.     ED Arrival time: 1112  ED physician:  MD Kell   [facility] Notification time: 1241  [facility] Assessment start time: 1245  Psychiatrist call time: 1335  Spoke with Dr. Red    Patient is referred by: [facility]     Reason for visit to ED - Presenting problem:     PT states reason for ED visit, \"I asked them for meds. I don't really know what happened. I asked them to call a peer support. I am not getting any anxiety medication. I haven't been on any medication since I left here.\"    Patient is dressed in paper scrubs with no 1:1 sitter and a staff member from [facility] at beside. Patient presents to ER due to behavioral disturbances, poor impulse control and verbal aggression towards staff at [facility]. Patient has multiple previous admissions to [facility] with last admission on 05/06/2025 and D/C on 05/09/2025. Dx with Mood Disorder, Unspecified; Stimulant Use Disorder; Cannabis Use Disorder; Opioid Use Disorder and Treatment Noncompliance per patients chart. ETOH <11. UDS positive for cannabinoids. Valproic Acid Lvl is 42.0. Patient is currently taking Depakote ER and Remeron. Patient reports having racing thoughts and increased depression. Reports that she did not sleep well and has not been receiving any medication for depression. Hx of Suicidal thoughts and behaviors. Patient is tearful. Patient is not responding to internal stimuli, delusional or psychotic. Denies SI, HI and AVH.     [facility] staff reports \"staff felt endangered and she was yelling so loud. The letter we received from [facility] stated that she shouldn't of came to us. We don't know what to do with her until Monday. We are trying to get her placed in a higher level of care. She was cussing out staff and we couldn't get her under control.\"  Staff member reports that patient has been  Headaches, Disp: 30 tablet, Rfl: 0     Collateral Information:     Name:   Relationship:   Phone Number:   Collateral:     Safety Issues:    Weapons: The importance of locking weapons in a secured location or removing from home was explained and recommended to: Patient reports no weapons.     Medications: The importance of medication management and locking extra medication in a secured location was explained and recommended to: Patient    Disposition:     Choose one of the options below for disposition:     1. Decision to admit to : no    Is patient voluntary:   If no has a 72 hold been initiated:   Admission Diagnosis:     Does the patient have a guardian or Medical POA: No  Has the guardian been notified or Medical POA: No    2. Decision to Discharge:   Does not meet criteria for acceptance to   unit due to:     3. Transferred:       Patient was transferred due to: Dr. Red is recommending transfer to [facility]     Other follow up information provided:      Safety Plan:     Safety Plan Completed: N/A - Patient transferring to acute inpatient facility    Provided a hard copy of safety plan to the patient:N/A - Patient transferring to acute inpatient facility    Explained how to follow the steps of the safety plan with patient:N/A - Patient transferring to acute inpatient facility    Discussed Facilitators and Barriers of the safety plan with patient: N/A - Patient transferring to acute inpatient facility    BASSAM Macario

## 2025-05-10 NOTE — CARE COORDINATION
Unable to make contact with patient due to patient being brought in by [facility] for a reevaluation.

## 2025-05-10 NOTE — ED PROVIDER NOTES
[facility] EMERGENCY DEPARTMENT  eMERGENCY dEPARTMENT eNCOUnter      Pt Name: Jaye Matamoros  MRN: [id number]  Birthdate 1990  Date of evaluation: 5/10/2025  Provider: Saira Castorena MD    CHIEF COMPLAINT       Chief Complaint   Patient presents with    Mental Health Problem     Is at [facility], arrived yesterday, while was here medications where changed, pt is having outbursts; denies SI/HI         HISTORY OF PRESENT ILLNESS   (Location/Symptom, Timing/Onset,Context/Setting, Quality, Duration, Modifying Factors, Severity)  Note limiting factors.     HPI    Jaye Matamoros is a 34 y.o. female with PMH of methamphetamine use disorder, bipolar disorder, intellectual disability, alcohol use disorder who presents to the emergency department with CC of mental health problem.  Patient states that she was at [facility] health facility for 17 days, then discharged and did not do well so was subsequently admitted to our inpatient psych for 3 days, discharged yesterday to [facility] rehab.  She reports she was taken off of all of her medications that she was on previously and started on a new medication.  She has been at [facility] for less than 24 hours and they brought her back to the ED today because she has had severe emotional dysregulation.  She denies SI or HI, but has had multiple angry outbursts which they state they cannot handle at their facility.  There is a staff member from [facility] at bedside.        NursingNotes were reviewed.    REVIEW OF SYSTEMS    (2-9 systems for level 4, 10 or more for level 5)     Review of Systems   Constitutional:  Negative for chills, fatigue and fever.   HENT:  Negative for congestion and sore throat.    Eyes:  Negative for pain and redness.   Respiratory:  Negative for cough, chest tightness and shortness of breath.    Cardiovascular:  Negative for chest pain and leg swelling.   Gastrointestinal:  Negative for abdominal pain, diarrhea, nausea

## 2025-05-10 NOTE — PROGRESS NOTES
Progress Note  Jaye Matamoros  5/10/2025 8:08 AM  Subjective:   Admit Date:   5/6/2025      CC/ADMIT DX:       Interval History:   Reviewed overnight events and nursing notes. She has had no dysuria. No CP or SOA.    I have reviewed all labs/diagnostics from the last 24hrs.       ROS:   I have done a 10 point ROS and all are negative, except what is mentioned in the HPI.    No diet orders on file    Medications:                 Objective:   Vitals: /83   Pulse 86   Temp 98.1 °F (36.7 °C)   Resp 18   Ht 1.549 m (5' 1\") Comment: Stated  Wt 52.5 kg (115 lb 12.8 oz)   LMP  (Within Weeks)   SpO2 99%   BMI 21.88 kg/m²  No intake or output data in the 24 hours ending 05/10/25 0808  General appearance: alert and cooperative with exam, in bed  Extremities: extremities normal, atraumatic, no cyanosis or edema  Neurologic:  No obvious focal neurologic deficits.  Skin: no rashes    Assessment and Plan:   Principal Problem:    Mood disorder  Active Problems:    Unspecified mood (affective) disorder  Resolved Problems:    * No resolved hospital problems. *    Trichomonas    Plan:   Continue present medication(s)    Continue Flagyl   Follow with Psych      Discharge planning:   her home     Reviewed treatment plans with the patient and/or family.             Electronically signed by Jacinto Guerrero MD on 5/10/2025 at 8:08 AM